# Patient Record
Sex: MALE | Race: WHITE | Employment: OTHER | URBAN - METROPOLITAN AREA
[De-identification: names, ages, dates, MRNs, and addresses within clinical notes are randomized per-mention and may not be internally consistent; named-entity substitution may affect disease eponyms.]

---

## 2017-02-08 ENCOUNTER — HOSPITAL ENCOUNTER (OUTPATIENT)
Dept: PHYSICAL THERAPY | Age: 83
Discharge: HOME | End: 2017-02-08
Attending: UROLOGY
Payer: MEDICARE

## 2017-02-08 DIAGNOSIS — R32 URINARY INCONTINENCE, UNSPECIFIED TYPE: ICD-10-CM

## 2017-02-08 PROCEDURE — G8988 SELF CARE GOAL STATUS: HCPCS

## 2017-02-08 PROCEDURE — 97162 PT EVAL MOD COMPLEX 30 MIN: CPT

## 2017-02-08 PROCEDURE — G8987 SELF CARE CURRENT STATUS: HCPCS

## 2017-02-08 NOTE — PROGRESS NOTES
Luis A Apple  : [facility]  [address], [address] [address],[address], [address] [facility]  [phone]   [phone]          OUTPATIENT PHYSICAL THERAPY:Initial Assessment 2017    ICD-10: Treatment Diagnosis:  Mixed incontinence (N39.46)  Precautions/Allergies:   Simvastatin and Statins-hmg-coa reductase inhibitors   Fall Risk Score: 2 (? 5 = High Risk)  MD Orders: Evaluate and treat MEDICAL/REFERRING DIAGNOSIS:  Urinary incontinence, unspecified type [R32]   DATE OF ONSET: 2016  REFERRING PHYSICIAN: Andry Hansen MD  RETURN PHYSICIAN APPOINTMENT: unknown     INITIAL ASSESSMENT:  Mr. Carlee Francois presents with symptoms of mixed incontinence. He demonstrates decreased strength and endurance of PFmm, as noted with biofeedback, likely contributing to the stress incontinence he has felt since his TURP procedure 2016. Previous prostate procedures, prior radiation to treat Prostate CA, as well as age are likely precipitating factors. Pt initially demonstrated assessory muscle use of abdominals and breath holding when performing kegels but this improved greatly with verbal and tactile cues. He was also educated on bladder irritants and recommended fluid intake for improved health of bladder, as well as urge suppression techniques. Pt will benefit from physical therapy to address stated problems. Plan of care was discussed and agreed upon with patient and HEP was initiated. Thank you for the opportunity to work with this patient. PROBLEM LIST (Impacting functional limitations):  1. Decreased Strength  2. Decreased strength of pelvic floor which limits bladder control INTERVENTIONS PLANNED:  1. Neuromuscular re-education  2. Biofeedback as needed  3. HEP  4. Bladder retraining  5. Bladder education  6. Neuromuscular Re-education/Strengthening  7. Therapeutic Exercise/Strengthening      TREATMENT PLAN:  Effective Dates: 17 TO 5/3/2017. Frequency/Duration: 1 time a week for 12 weeks  GOALS: (Goals have been discussed and agreed upon with patient.)  Short-Term Functional Goals: Time Frame: 2 weeks  1. Patient will demonstrate independence with home exercise program.  2. Pt will report increase in water intake to 4 cups/day for improved bladder health  Discharge Goals: Time Frame: 12 weeks  1. Pt will score 3 on NIH for overall functional improvement. 2. Pt will report decreased use of ppd to 2 for decreased social anxiety. 3. Pt will demonstrate improvement in strength and endurance to be able to hold isometric PF contraction 10 sec 10x for improved continence and decreased use of pads. 4. Pt will report decreased nocturia to 2x/night for improved quality of sleep and overall health. Rehabilitation Potential For Stated Goals: Good  Regarding Guy Faye's therapy, I certify that the treatment plan above will be carried out by a therapist or under their direction. Thank you for this referral,  Pilar Lomas, PT     Referring Physician Signature: Addi Lloyd MD              Date                    The information in this section was collected on 2/8/17 (except where otherwise noted). HISTORY:   History of Present Injury/Illness (Reason for Referral):  Pt reports incidence of incontinence Q2 hours with a strong urge. He also reports stress incontinence periodically with cough/sneeze and transitional movements. He took myrabetrix but did not feel it made much of a difference. Past Medical History/Comorbidities:   Mr. Joanna Ferrer  has a past medical history of Anemia; Anticoagulated on Coumadin (4/22/2015); Borderline type 2 diabetes mellitus; BPH (benign prostatic hyperplasia) (7/1/2016); BPH with obstruction/lower urinary tract symptoms (7/29/2010); Carotid stenosis; Chest discomfort, tightness, pressure  (10/8/2015); Chronic diastolic (congestive) heart failure ([address] Chronic diastolic heart failure ([address] (10/8/2015);  Deep vein thrombosis (DVT) ([facility] (10/8/2015); Diabetes mellitus type II, controlled ([facility] (4/22/2015); Diabetes mellitus type II, uncontrolled ([facility] DVT (deep venous thrombosis) ([facility] (4/23/2015); Elevated prostate specific antigen (PSA) (5/14/2010); Essential hypertension (8/2/2016); Ex-smoker for more than 1 year; GERD (gastroesophageal reflux disease); HTN (hypertension) (4/22/2015); Hyperlipidemia (10/8/2015); Hypoxemia (3/7/2015); Lumbar stenosis with neurogenic claudication (12/10/2014); Prostate cancer ([facility] (2010); Pul[facility] hypertension [facility] (4/23/2015); Spinal abscess ([facility] (4/2015); Stroke [facility]); and Wound infection (4/22/2015). He also has no past medical history of Adverse effect of anesthesia; Difficult intubation; Malignant hyperthermia due to anesthesia; Nausea & vomiting; or Pseudocholinesterase deficiency. Mr. Joanna Ferrer  has a past surgical history that includes urological; biopsy prostate; appendectomy (age 17); hernia repair (Right, 1's); cervical fusion (1990's); tonsillectomy (age 16); and cataract removal (Bilateral). TURP 7/2016,   Social History/Living Environment:     lives with wife  Prior Level of Function/Work/Activity:  Retired;  Knee problems hold him back from walking for exercise  Previous Treatment Approaches:          TURP 6 mos ago - incontinence started after this treatment  Radiation treatments a few yrs ago  Personal Factors:          Age:  81 y.o. Current Medications:    Current Outpatient Prescriptions:     finasteride (PROSCAR) 5 mg tablet, TAKE 1 TABLET BY MOUTH DAILY, Disp: 90 Tab, Rfl: 11    mirabegron ER (MYRBETRIQ) 50 mg ER tablet, Take 50 mg by mouth daily. , Disp: , Rfl:     amLODIPine (NORVASC) 5 mg tablet, Take 5 mg by mouth every morning., Disp: , Rfl:     ezetimibe (ZETIA) 10 mg tablet, Take 10 mg by mouth daily. , Disp: , Rfl:     metoprolol (LOPRESSOR) 100 mg tablet, Take 100 mg by mouth two (2) times a day.  Indications: HYPERTENSION, Disp: , Rfl:     fluticasone (FLONASE) 50 mcg/actuation nasal spray, , Disp: , Rfl: 5    lansoprazole (PREVACID) 30 mg capsule, Take 30 mg by mouth Daily (before breakfast). Indications: GASTROESOPHAGEAL REFLUX, Disp: , Rfl:     multivitamin (ONE A DAY) tablet, Take 1 Tab by mouth daily. Holding for surgery 6/24/16, Disp: , Rfl:     allopurinol (ZYLOPRIM) 300 mg tablet, Take 100 mg by mouth every morning. Indications: GOUT, GOUTY ARTHRITIS, Disp: , Rfl:     glipiZIDE (GLUCOTROL) 5 mg tablet, Take  by mouth two (2) times a day., Disp: , Rfl:     valsartan (DIOVAN) 320 mg tablet, Take 320 mg by mouth every morning. Indications: HYPERTENSION, Disp: , Rfl:     aspirin delayed-release 81 mg tablet, Take 81 mg by mouth daily. Holding for surgery per Dr Talon Mccall- last dose 6/23/16, Disp: , Rfl:    Date Last Reviewed:  02/08/17  Incontinence History:  PROBLEM: YES/NO: COMMENTS:   Loss of urine with coughing YES    Loss of urine with lifting  NO    Loss of urine with exercise, running NO Unknown - 'i've been slack on that bc of my knee'   Loss of urine with strong urge YES    Loss of urine with approaching the bathroom YES sometimes   Loss of urine with key in lock NO    Loss of urine as getting to toilet/removing clothing NO    Loss of urine when hearing running water NO    Have difficulty initiating a urine stream NO    Have difficulty stopping urine stream YES 'slow'   Have to strain to empty bladder NO    Dribble urine when urinating NO    Dribble urine after emptying bladder YES Very little   Experience pain with urination NO    Experience burning during urination NO    Have blood in urine NO      Voiding Patterns: Patient voids every 2 hours during the day and Q2hrs during the night. Patient reports that he empties bladder fully. Patient uses pads for bladder protection; he changes pads 4-5 times per day on a good day; 7-9 on a bad day . Fluid Intake: Patient drinks  (2 coffee; 3 tea; 2 cups water) of fluid per day.   He consumes 3 cups of caffeinated beverages per day. Patient tries not to limit fluid intake to control bladder. Bowel Habits: Patient demonstrates normal bowel habits. Mobility / Self Care: indep  Personal / Social History:  · Sexually active? NO:   · Social activities restricted due to urinary incontinence? NO:       Number of Personal Factors/Comorbidities that affect the Plan of Care: 1-2: MODERATE COMPLEXITY   EXAMINATION:   External Observation:   · Voluntary Contraction: present  · Voluntary Relaxation: present  · Involuntary Contraction: not present  · Involuntary Relaxation: not present  SEMG evaluation (Date 2/8/17):  ·  Resting Tone: <1uV  ·  Quality of Resting Tone: normal  ·  5 Second Hold: 4 uV  ·  10 Second Hold: currently unable to hold longer than 5 sec  ·  Quality of Recruitment: Good   ·  Quality of Relaxation: Good   ·  Quality of Holding: Fair   ·  Stability of Hold: Fair   ·  Stability of Rest: Good      Body Structures Involved:  1. Nerves  2. Muscles Body Functions Affected:  1. Mental  2. Genitourinary  3. Neuromusculoskeletal  4. Movement Related Activities and Participation Affected:  1. Learning and Applying Knowledge  2. General Tasks and Demands  3. Mobility  4. Self Care  5. Domestic Life  6. Interpersonal Interactions and Relationships  7. Community, Social and [place]   Number of elements that affect the Plan of Care: 3: MODERATE COMPLEXITY   CLINICAL PRESENTATION:   Presentation: Evolving clinical presentation with changing clinical characteristics: MODERATE COMPLEXITY   CLINICAL DECISION MAKING:   Outcome Measure:    Tool Used: NIH - Chronic Prostatitis Symptom Index (NIH - CPSI for Males)   Score:  Initial: 0/1/6 Most Recent: X (Date: -- )   Interpretation of Score:  Pain:  Score 0 1-4 5-8 9-12 13-16 17-20 21   Modifier CH CI CJ CK CL CM CN     Urinary Symptoms:  Score 0 1 2-3 4-5 6-7 8-9 10   Modifier CH CI CJ CK CL CM CN     Quality of Life Index:  Score 0 1-2 3-4 5-7 8-9 10-11 12   Modifier CH CI CJ CK CL CM CN       ? Self Care:     - CURRENT STATUS: CK - 40%-59% impaired, limited or restricted    - GOAL STATUS: CJ - 20%-39% impaired, limited or restricted    - D/C STATUS:  ---------------To be determined---------------       Medical Necessity:   · Patient demonstrates good rehab potential due to higher previous functional level. Reason for Services/Other Comments:  · Patient continues to require skilled intervention due to ongoing goals noted above. Use of outcome tool(s) and clinical judgement create a POC that gives a: Questionable prediction of patient's progress: MODERATE COMPLEXITY   TREATMENT:   (In addition to Assessment/Re-Assessment sessions the following treatments were rendered)  Pre-treatment Symptoms/Complaints:  Voiced frustration at lack of ability to hold continence and need to wear pads  Pain: Initial:   Pain Intensity 1: 0 0 Post Session:  0     THERAPEUTIC EXERCISE: (  minutes):  Exercises per grid below to improve strength and coordination. Required minimal verbal and tactile cues to promote proper body mechanics and promote proper body breathing techniques. Progressed resistance and repetitions as indicated. Date:   Date:   Date:     Activity/Exercise Parameters Parameters Parameters                                                Exercises:  Patient instructed in pelvic floor exercises listed below:  kegels 5 sec hold 10x TID  The following educational topics were reviewed with patient:  Bladder health, tips to control urge, bladder diary, pelvic floor anatomy, how foods affect bladder, bladder retraining. Treatment/Session Assessment:  Pt reported good understanding of plan of care as well as exercises. All questions were answered and pt was invited to call with any further questions or issues   · Response to Treatment:  good  · Compliance with Program/Exercises: Will assess as treatment progresses.   · Recommendations/Intent for next treatment session: \"Next visit will focus on advancements to more challenging activities\".   Total Treatment Duration:  PT Patient Time In/Time Out  Time In: 1100  Time Out: 5111 [facility] Evelyne Flores

## 2017-02-08 NOTE — PROGRESS NOTES
Ambulatory/Rehab Services H2 Model Falls Risk Assessment    Risk Factor Pts. ·   Confusion/Disorientation/Impulsivity  []    4 ·   Symptomatic Depression  []   2 ·   Altered Elimination  []   1 ·   Dizziness/Vertigo  []   1 ·   Gender (Male)  [x]   1 ·   Any administered antiepileptics (anticonvulsants):  []   2 ·   Any administered benzodiazepines:  []   1 ·   Visual Impairment (specify):  []   1 ·   Portable Oxygen Use  []   1 ·   Orthostatic ? BP  []   1 ·   History of Recent Falls (within 3 mos.)  []   5     Ability to Rise from Chair (choose one) Pts. ·   Ability to rise in a single movement  []   0 ·   Pushes up, successful in one attempt  [x]   1 ·   Multiple attempts, but successful  []   3 ·   Unable to rise without assistance  []   4   Total: (5 or greater = High Risk) 2     Falls Prevention Plan:   []                Physical Limitations to Exercise (specify):   []                Mobility Assistance Device (type):   []                Exercise/Equipment Adaptation (specify):    ©2010 [employer] [address] [place] [address] Patent #[id number].  Federal Law prohibits the replication, distribution or use without written permission from [employer]

## 2017-02-14 ENCOUNTER — HOSPITAL ENCOUNTER (OUTPATIENT)
Dept: PHYSICAL THERAPY | Age: 83
Discharge: HOME | End: 2017-02-14
Attending: UROLOGY
Payer: MEDICARE

## 2017-02-14 DIAGNOSIS — R32 URINARY INCONTINENCE, UNSPECIFIED TYPE: ICD-10-CM

## 2017-02-14 PROCEDURE — 97110 THERAPEUTIC EXERCISES: CPT

## 2017-02-14 NOTE — PROGRESS NOTES
Raman Caraballo  : [facility] [place] [id number] [facility], [address] [place] [address],[address], [facility] [address]  [phone]   [phone]          OUTPATIENT PHYSICAL THERAPY:Daily Note 2017    ICD-10: Treatment Diagnosis:  Mixed incontinence (N39.46)  Precautions/Allergies:   Simvastatin and Statins-hmg-coa reductase inhibitors   Fall Risk Score: 2 (? 5 = High Risk)  MD Orders: Evaluate and treat MEDICAL/REFERRING DIAGNOSIS:  Urinary incontinence, unspecified type [R32]   DATE OF ONSET: 2016  REFERRING PHYSICIAN: Joelle Juarez MD  RETURN PHYSICIAN APPOINTMENT: unknown     INITIAL ASSESSMENT:  Mr. Leonard Alaniz presents with symptoms of mixed incontinence. He demonstrates decreased strength and endurance of PFmm, as noted with biofeedback, likely contributing to the stress incontinence he has felt since his TURP procedure 2016. Previous prostate procedures, prior radiation to treat Prostate CA, as well as age are likely precipitating factors. Pt initially demonstrated assessory muscle use of abdominals and breath holding when performing kegels but this improved greatly with verbal and tactile cues. He was also educated on bladder irritants and recommended fluid intake for improved health of bladder, as well as urge suppression techniques. Pt will benefit from physical therapy to address stated problems. Plan of care was discussed and agreed upon with patient and HEP was initiated. Thank you for the opportunity to work with this patient. PROBLEM LIST (Impacting functional limitations):  1. Decreased Strength  2. Decreased strength of pelvic floor which limits bladder control INTERVENTIONS PLANNED:  1. Neuromuscular re-education  2. Biofeedback as needed  3. HEP  4. Bladder retraining  5. Bladder education  6. Neuromuscular Re-education/Strengthening  7. Therapeutic Exercise/Strengthening      TREATMENT PLAN:  Effective Dates: 17 TO 5/3/2017.   Frequency/Duration: 1 time a week for 12 weeks  GOALS: (Goals have been discussed and agreed upon with patient.)  Short-Term Functional Goals: Time Frame: 2 weeks  1. Patient will demonstrate independence with home exercise program.  2. Pt will report increase in water intake to 4 cups/day for improved bladder health  Discharge Goals: Time Frame: 12 weeks  1. Pt will score 3 on NIH for overall functional improvement. 2. Pt will report decreased use of ppd to 2 for decreased social anxiety. 3. Pt will demonstrate improvement in strength and endurance to be able to hold isometric PF contraction 10 sec 10x for improved continence and decreased use of pads. 4. Pt will report decreased nocturia to 2x/night for improved quality of sleep and overall health. Rehabilitation Potential For Stated Goals: Good  Regarding [facility], I certify that the treatment plan above will be carried out by a therapist or under their direction. Thank you for this referral,  Betty Alston, PT     Referring Physician Signature: Lyn Sheikh MD              Date                    The information in this section was collected on 2/8/17 (except where otherwise noted). HISTORY:   History of Present Injury/Illness (Reason for Referral):  Pt reports incidence of incontinence Q2 hours with a strong urge. He also reports stress incontinence periodically with cough/sneeze and transitional movements. He took myrabetrix but did not feel it made much of a difference. Past Medical History/Comorbidities:   Mr. Abiel Cm  has a past medical history of Anemia; Anticoagulated on Coumadin (4/22/2015); Borderline type 2 diabetes mellitus; BPH (benign prostatic hyperplasia) (7/1/2016); BPH with obstruction/lower urinary tract symptoms (7/29/2010); Carotid stenosis; Chest discomfort, tightness, pressure  (10/8/2015); Chronic diastolic (congestive) heart failure ([facility] [address] Chronic diastolic heart failure ([facility] [address] (10/8/2015);  Deep vein thrombosis (DVT) ([facility]) (10/8/2015); Diabetes mellitus type II, controlled ([facility] [address] (4/22/2015); Diabetes mellitus type II, uncontrolled ([facility] [address] DVT (deep venous thrombosis) ([facility] [address] (4/23/2015); Elevated prostate specific antigen (PSA) (5/14/2010); Essential hypertension (8/2/2016); Ex-smoker for more than 1 year; GERD (gastroesophageal reflux disease); HTN (hypertension) (4/22/2015); Hyperlipidemia (10/8/2015); Hypoxemia (3/7/2015); Lumbar stenosis with neurogenic claudication (12/10/2014); Prostate cancer ([facility] [address] (2010); Pulmonary hypertension ([facility] [address] (4/23/2015); Spinal abscess ([facility] [address] (4/2015); Stroke [facility]); and Wound infection (4/22/2015). He also has no past medical history of Adverse effect of anesthesia; Difficult intubation; Malignant hyperthermia due to anesthesia; Nausea & vomiting; or Pseudocholinesterase deficiency. Mr. Dash Goldberg  has a past surgical history that includes urological; biopsy prostate; appendectomy (age 17); hernia repair (Right, 1's); cervical fusion (1990's); tonsillectomy (age 16); and cataract removal (Bilateral). TURP 7/2016,   Social History/Living Environment:     lives with wife  Prior Level of Function/Work/Activity:  Retired;  Knee problems hold him back from walking for exercise  Previous Treatment Approaches:          TURP 6 mos ago - incontinence started after this treatment  Radiation treatments a few yrs ago  Personal Factors:          Age:  81 y.o. Current Medications:    Current Outpatient Prescriptions:     finasteride (PROSCAR) 5 mg tablet, TAKE 1 TABLET BY MOUTH DAILY, Disp: 90 Tab, Rfl: 11    mirabegron ER (MYRBETRIQ) 50 mg ER tablet, Take 50 mg by mouth daily. , Disp: , Rfl:     amLODIPine (NORVASC) 5 mg tablet, Take 5 mg by mouth every morning., Disp: , Rfl:     ezetimibe (ZETIA) 10 mg tablet, Take 10 mg by mouth daily. , Disp: , Rfl:     metoprolol (LOPRESSOR) 100 mg tablet, Take 100 mg by mouth two (2) times a day.  Indications: HYPERTENSION, Disp: , Rfl:     fluticasone (FLONASE) 50 mcg/actuation nasal spray, , Disp: , Rfl: 5    lansoprazole (PREVACID) 30 mg capsule, Take 30 mg by mouth Daily (before breakfast). Indications: GASTROESOPHAGEAL REFLUX, Disp: , Rfl:     multivitamin (ONE A DAY) tablet, Take 1 Tab by mouth daily. Holding for surgery 6/24/16, Disp: , Rfl:     allopurinol (ZYLOPRIM) 300 mg tablet, Take 100 mg by mouth every morning. Indications: GOUT, GOUTY ARTHRITIS, Disp: , Rfl:     glipiZIDE (GLUCOTROL) 5 mg tablet, Take  by mouth two (2) times a day., Disp: , Rfl:     valsartan (DIOVAN) 320 mg tablet, Take 320 mg by mouth every morning. Indications: HYPERTENSION, Disp: , Rfl:     aspirin delayed-release 81 mg tablet, Take 81 mg by mouth daily. Holding for surgery per Dr Caryl Hill- last dose 6/23/16, Disp: , Rfl:    Date Last Reviewed:  02/14/17  Incontinence History:  PROBLEM: YES/NO: COMMENTS:   Loss of urine with coughing YES    Loss of urine with lifting  NO    Loss of urine with exercise, running NO Unknown - 'i've been slack on that bc of my knee'   Loss of urine with strong urge YES    Loss of urine with approaching the bathroom YES sometimes   Loss of urine with key in lock NO    Loss of urine as getting to toilet/removing clothing NO    Loss of urine when hearing running water NO    Have difficulty initiating a urine stream NO    Have difficulty stopping urine stream YES 'slow'   Have to strain to empty bladder NO    Dribble urine when urinating NO    Dribble urine after emptying bladder YES Very little   Experience pain with urination NO    Experience burning during urination NO    Have blood in urine NO      Voiding Patterns: Patient voids every 2 hours during the day and Q2hrs during the night. Patient reports that he empties bladder fully. Patient uses pads for bladder protection; he changes pads 4-5 times per day on a good day; 7-9 on a bad day . Fluid Intake: Patient drinks  (2 coffee; 3 tea; 2 cups water) of fluid per day.   He consumes 3 cups of caffeinated beverages per day. Patient tries not to limit fluid intake to control bladder. Bowel Habits: Patient demonstrates normal bowel habits. Mobility / Self Care: indep  Personal / Social History:  · Sexually active? NO:   · Social activities restricted due to urinary incontinence? NO:       Number of Personal Factors/Comorbidities that affect the Plan of Care: 1-2: MODERATE COMPLEXITY   EXAMINATION:   External Observation:   · Voluntary Contraction: present  · Voluntary Relaxation: present  · Involuntary Contraction: not present  · Involuntary Relaxation: not present  SEMG evaluation (Date 2/8/17):  ·  Resting Tone: <1uV  ·  Quality of Resting Tone: normal  ·  5 Second Hold: 4 uV  ·  10 Second Hold: currently unable to hold longer than 5 sec  ·  Quality of Recruitment: Good   ·  Quality of Relaxation: Good   ·  Quality of Holding: Fair   ·  Stability of Hold: Fair   ·  Stability of Rest: Good      Body Structures Involved:  1. Nerves  2. Muscles Body Functions Affected:  1. Mental  2. Genitourinary  3. Neuromusculoskeletal  4. Movement Related Activities and Participation Affected:  1. Learning and Applying Knowledge  2. General Tasks and Demands  3. Mobility  4. Self Care  5. Domestic Life  6. Interpersonal Interactions and Relationships  7. Community, Social and [place]   Number of elements that affect the Plan of Care: 3: MODERATE COMPLEXITY   CLINICAL PRESENTATION:   Presentation: Evolving clinical presentation with changing clinical characteristics: MODERATE COMPLEXITY   CLINICAL DECISION MAKING:   Outcome Measure:    Tool Used: NIH - Chronic Prostatitis Symptom Index (NIH - CPSI for Males)   Score:  Initial: 0/1/6 Most Recent: X (Date: -- )   Interpretation of Score:  Pain:  Score 0 1-4 5-8 9-12 13-16 17-20 21   Modifier CH CI CJ CK CL CM CN     Urinary Symptoms:  Score 0 1 2-3 4-5 6-7 8-9 10   Modifier CH CI CJ CK CL CM CN     Quality of Life Index:  Score 0 1-2 3-4 5-7 8-9 10-11 12   Modifier [address] CI CJ CK CL CM CN       ? Self Care:     - CURRENT STATUS: CK - 40%-59% impaired, limited or restricted    - GOAL STATUS: CJ - 20%-39% impaired, limited or restricted    - D/C STATUS:  ---------------To be determined---------------       Medical Necessity:   · Patient demonstrates good rehab potential due to higher previous functional level. Reason for Services/Other Comments:  · Patient continues to require skilled intervention due to ongoing goals noted above. Use of outcome tool(s) and clinical judgement create a POC that gives a: Questionable prediction of patient's progress: MODERATE COMPLEXITY   TREATMENT:   (In addition to Assessment/Re-Assessment sessions the following treatments were rendered)  Pre-treatment Symptoms/Complaints:  He states ex's have been going well. Slept for 4 hrs for the first time last night. Did report increased leaking sat and sun over the weekend but can not find a reason why that could be. v  Pain: Initial:   Pain Intensity 1: 0 0 Post Session:  0     THERAPEUTIC EXERCISE: ( 55 minutes):  Exercises per grid below to improve strength and coordination. Required minimal verbal and tactile cues to promote proper body mechanics and promote proper body breathing techniques. Progressed resistance and repetitions as indicated. Date:  2/14/17 Date:   Date:     Activity/Exercise Parameters Parameters Parameters   Isometric PFmm See below     Bridge with ball squeeze 8x     Bridge with isometric ER      Sidelying clam GTB 8x     sidestepping      Monster walks      squats         Exercises:  Patient instructed in pelvic floor exercises listed below:  kegels 5 sec hold 10x TID  The following educational topics were reviewed with patient:  Bladder health, tips to control urge, bladder diary, pelvic floor anatomy, how foods affect bladder, bladder retraining. Treatment/Session Assessment: good tolerance to ex's.   Improved endurance noted with ability to hold contraction closer to 6-8sec today. Initiated large muscle group ex's and issued bridge and clam for HEP. GTB issued. Discussed quick flicks when in bathroom preparing to urinate as this is an incidence of much of his leaking. Other large incidence is starting walking after standing up from couch. · Response to Treatment:  good  · Compliance with Program/Exercises: Will assess as treatment progresses. · Recommendations/Intent for next treatment session: \"Next visit will focus on advancements to more challenging activities\".   Total Treatment Duration:  PT Patient Time In/Time Out  Time In: 1430  Time Out: 1530    Shala Israel, PT

## 2017-03-01 ENCOUNTER — HOSPITAL ENCOUNTER (OUTPATIENT)
Dept: PHYSICAL THERAPY | Age: 83
Discharge: HOME | End: 2017-03-01
Attending: UROLOGY
Payer: MEDICARE

## 2017-03-01 DIAGNOSIS — R32 URINARY INCONTINENCE, UNSPECIFIED TYPE: ICD-10-CM

## 2017-03-01 PROCEDURE — 97110 THERAPEUTIC EXERCISES: CPT

## 2017-03-01 NOTE — PROGRESS NOTES
Ashley Barajas  : 1934 [facility]  [address], [address], [address] [facility]  [phone]   [phone]          OUTPATIENT PHYSICAL THERAPY:Daily Note 3/1/2017    ICD-10: Treatment Diagnosis:  Mixed incontinence (N39.46)  Precautions/Allergies:   Simvastatin and Statins-hmg-coa reductase inhibitors   Fall Risk Score: 2 (? 5 = High Risk)  MD Orders: Evaluate and treat MEDICAL/REFERRING DIAGNOSIS:  Urinary incontinence, unspecified type [R32]   DATE OF ONSET: 2016  REFERRING PHYSICIAN: Lyn Sheikh MD  RETURN PHYSICIAN APPOINTMENT: unknown     INITIAL ASSESSMENT:  Mr. Abiel Cm presents with symptoms of mixed incontinence. He demonstrates decreased strength and endurance of PFmm, as noted with biofeedback, likely contributing to the stress incontinence he has felt since his TURP procedure 2016. Previous prostate procedures, prior radiation to treat Prostate CA, as well as age are likely precipitating factors. Pt initially demonstrated assessory muscle use of abdominals and breath holding when performing kegels but this improved greatly with verbal and tactile cues. He was also educated on bladder irritants and recommended fluid intake for improved health of bladder, as well as urge suppression techniques. Pt will benefit from physical therapy to address stated problems. Plan of care was discussed and agreed upon with patient and HEP was initiated. Thank you for the opportunity to work with this patient. PROBLEM LIST (Impacting functional limitations):  1. Decreased Strength  2. Decreased strength of pelvic floor which limits bladder control INTERVENTIONS PLANNED:  1. Neuromuscular re-education  2. Biofeedback as needed  3. HEP  4. Bladder retraining  5. Bladder education  6. Neuromuscular Re-education/Strengthening  7. Therapeutic Exercise/Strengthening      TREATMENT PLAN:  Effective Dates: 17 TO 5/3/2017.   Frequency/Duration: 1 time a week for 12 weeks  GOALS: (Goals have been discussed and agreed upon with patient.)  Short-Term Functional Goals: Time Frame: 2 weeks  1. Patient will demonstrate independence with home exercise program.  2. Pt will report increase in water intake to 4 cups/day for improved bladder health  Discharge Goals: Time Frame: 12 weeks  1. Pt will score 3 on NIH for overall functional improvement. 2. Pt will report decreased use of ppd to 2 for decreased social anxiety. 3. Pt will demonstrate improvement in strength and endurance to be able to hold isometric PF contraction 10 sec 10x for improved continence and decreased use of pads. 4. Pt will report decreased nocturia to 2x/night for improved quality of sleep and overall health. Rehabilitation Potential For Stated Goals: Good  Regarding Abimael Faye's therapy, I certify that the treatment plan above will be carried out by a therapist or under their direction. Thank you for this referral,  Renzo Lovett, PT     Referring Physician Signature: Yanira Drake MD              Date                    The information in this section was collected on 2/8/17 (except where otherwise noted). HISTORY:   History of Present Injury/Illness (Reason for Referral):  Pt reports incidence of incontinence Q2 hours with a strong urge. He also reports stress incontinence periodically with cough/sneeze and transitional movements. He took myrabetrix but did not feel it made much of a difference. Past Medical History/Comorbidities:   Mr. Jv Zuniga  has a past medical history of Anemia; Anticoagulated on Coumadin (4/22/2015); Borderline type 2 diabetes mellitus; BPH (benign prostatic hyperplasia) (7/1/2016); BPH with obstruction/lower urinary tract symptoms (7/29/2010); Carotid stenosis; Chest discomfort, tightness, pressure  (10/8/2015); Chronic diastolic (congestive) heart failure ([address] Chronic diastolic heart failure ([address] (10/8/2015);  Deep vein thrombosis (DVT) ([facility]) (10/8/2015); Diabetes mellitus type II, controlled ([facility] (4/22/2015); Diabetes mellitus type II, uncontrolled ([facility] DVT (deep venous thrombosis) ([facility] (4/23/2015); Elevated prostate specific antigen (PSA) (5/14/2010); Essential hypertension (8/2/2016); Ex-smoker for more than 1 year; GERD (gastroesophageal reflux disease); HTN (hypertension) (4/22/2015); Hyperlipidemia (10/8/2015); Hypoxemia (3/7/2015); Lumbar stenosis with neurogenic claudication (12/10/2014); Prostate cancer ([facility] (2010); Pulmonary hypertension ([facility] (4/23/2015); Spinal abscess ([facility] (4/2015); Stroke [facility]); and Wound infection (4/22/2015). He also has no past medical history of Adverse effect of anesthesia; Difficult intubation; Malignant hyperthermia due to anesthesia; Nausea & vomiting; or Pseudocholinesterase deficiency. Mr. Ayad Watters  has a past surgical history that includes urological; biopsy prostate; appendectomy (age 17); hernia repair (Right, 1's); cervical fusion (1990's); tonsillectomy (age 16); and cataract removal (Bilateral). TURP 7/2016,   Social History/Living Environment:     lives with wife  Prior Level of Function/Work/Activity:  Retired;  Knee problems hold him back from walking for exercise  Previous Treatment Approaches:          TURP 6 mos ago - incontinence started after this treatment  Radiation treatments a few yrs ago  Personal Factors:          Age:  81 y.o. Current Medications:    Current Outpatient Prescriptions:     finasteride (PROSCAR) 5 mg tablet, TAKE 1 TABLET BY MOUTH DAILY, Disp: 90 Tab, Rfl: 11    mirabegron ER (MYRBETRIQ) 50 mg ER tablet, Take 50 mg by mouth daily. , Disp: , Rfl:     amLODIPine (NORVASC) 5 mg tablet, Take 5 mg by mouth every morning., Disp: , Rfl:     ezetimibe (ZETIA) 10 mg tablet, Take 10 mg by mouth daily. , Disp: , Rfl:     metoprolol (LOPRESSOR) 100 mg tablet, Take 100 mg by mouth two (2) times a day.  Indications: HYPERTENSION, Disp: , Rfl:     fluticasone (FLONASE) 50 mcg/actuation nasal spray, , Disp: , Rfl: 5    lansoprazole (PREVACID) 30 mg capsule, Take 30 mg by mouth Daily (before breakfast). Indications: GASTROESOPHAGEAL REFLUX, Disp: , Rfl:     multivitamin (ONE A DAY) tablet, Take 1 Tab by mouth daily. Holding for surgery 6/24/16, Disp: , Rfl:     allopurinol (ZYLOPRIM) 300 mg tablet, Take 100 mg by mouth every morning. Indications: GOUT, GOUTY ARTHRITIS, Disp: , Rfl:     glipiZIDE (GLUCOTROL) 5 mg tablet, Take  by mouth two (2) times a day., Disp: , Rfl:     valsartan (DIOVAN) 320 mg tablet, Take 320 mg by mouth every morning. Indications: HYPERTENSION, Disp: , Rfl:     aspirin delayed-release 81 mg tablet, Take 81 mg by mouth daily. Holding for surgery per Dr Jordan Schneider- last dose 6/23/16, Disp: , Rfl:    Date Last Reviewed:  03/01/17  Incontinence History:  PROBLEM: YES/NO: COMMENTS:   Loss of urine with coughing YES    Loss of urine with lifting  NO    Loss of urine with exercise, running NO Unknown - 'i've been slack on that bc of my knee'   Loss of urine with strong urge YES    Loss of urine with approaching the bathroom YES sometimes   Loss of urine with key in lock NO    Loss of urine as getting to toilet/removing clothing NO    Loss of urine when hearing running water NO    Have difficulty initiating a urine stream NO    Have difficulty stopping urine stream YES 'slow'   Have to strain to empty bladder NO    Dribble urine when urinating NO    Dribble urine after emptying bladder YES Very little   Experience pain with urination NO    Experience burning during urination NO    Have blood in urine NO      Voiding Patterns: Patient voids every 2 hours during the day and Q2hrs during the night. Patient reports that he empties bladder fully. Patient uses pads for bladder protection; he changes pads 4-5 times per day on a good day; 7-9 on a bad day . Fluid Intake: Patient drinks  (2 coffee; 3 tea; 2 cups water) of fluid per day.   He consumes 3 cups of caffeinated beverages per day. Patient tries not to limit fluid intake to control bladder. Bowel Habits: Patient demonstrates normal bowel habits. Mobility / Self Care: indep  Personal / Social History:  · Sexually active? NO:   · Social activities restricted due to urinary incontinence? NO:       Number of Personal Factors/Comorbidities that affect the Plan of Care: 1-2: MODERATE COMPLEXITY   EXAMINATION:   External Observation:   · Voluntary Contraction: present  · Voluntary Relaxation: present  · Involuntary Contraction: not present  · Involuntary Relaxation: not present  SEMG evaluation (Date 2/8/17):  ·  Resting Tone: <1uV  ·  Quality of Resting Tone: normal  ·  5 Second Hold: 4 uV  ·  10 Second Hold: currently unable to hold longer than 5 sec  ·  Quality of Recruitment: Good   ·  Quality of Relaxation: Good   ·  Quality of Holding: Fair   ·  Stability of Hold: Fair   ·  Stability of Rest: Good      Body Structures Involved:  1. Nerves  2. Muscles Body Functions Affected:  1. Mental  2. Genitourinary  3. Neuromusculoskeletal  4. Movement Related Activities and Participation Affected:  1. Learning and Applying Knowledge  2. General Tasks and Demands  3. Mobility  4. Self Care  5. Domestic Life  6. Interpersonal Interactions and Relationships  7. Community, Social and [place]   Number of elements that affect the Plan of Care: 3: MODERATE COMPLEXITY   CLINICAL PRESENTATION:   Presentation: Evolving clinical presentation with changing clinical characteristics: MODERATE COMPLEXITY   CLINICAL DECISION MAKING:   Outcome Measure:    Tool Used: NIH - Chronic Prostatitis Symptom Index (NIH - CPSI for Males)   Score:  Initial: 0/1/6 Most Recent: X (Date: -- )   Interpretation of Score:  Pain:  Score 0 1-4 5-8 9-12 13-16 17-20 21   Modifier CH CI CJ CK CL CM CN     Urinary Symptoms:  Score 0 1 2-3 4-5 6-7 8-9 10   Modifier CH CI CJ CK CL CM CN     Quality of Life Index:  Score 0 1-2 3-4 5-7 8-9 10-11 12   Modifier [facility] CI CJ CK CL CM CN       ? Self Care:     - CURRENT STATUS: CK - 40%-59% impaired, limited or restricted    - GOAL STATUS: CJ - 20%-39% impaired, limited or restricted    - D/C STATUS:  ---------------To be determined---------------       Medical Necessity:   · Patient demonstrates good rehab potential due to higher previous functional level. Reason for Services/Other Comments:  · Patient continues to require skilled intervention due to ongoing goals noted above. Use of outcome tool(s) and clinical judgement create a POC that gives a: Questionable prediction of patient's progress: MODERATE COMPLEXITY   TREATMENT:   (In addition to Assessment/Re-Assessment sessions the following treatments were rendered)  Pre-treatment Symptoms/Complaints: sleeping about 2.5hrs at a time at night now. States most of problems occur when fatigued. He is having a lot of knee problems. Reports some difficulty with leaking performing transitional movements especially from his couch which is low  Pain: Initial:   Pain Intensity 1: (P) 0 0 Post Session:  0     THERAPEUTIC EXERCISE: ( 60 minutes):  Exercises per grid below to improve strength and coordination. Required minimal verbal and tactile cues to promote proper body mechanics and promote proper body breathing techniques. Progressed resistance and repetitions as indicated.     Date:  2/14/17 Date:  3/1/17 Date:     Activity/Exercise Parameters Parameters Parameters   Isometric PFmm See below See below    Bridge with ball squeeze 8x 10x    Bridge with isometric ER  GTB 10x    Sidelying clam GTB 8x GTB 2x10    sidestepping  1 lap GTB    Monster walks      squats  GTB 8x    Sit to stand mechanics with kegel  8'             Exercises:  Patient instructed in pelvic floor exercises listed below:  kegels 5 sec hold 10x TID  3/1/17 - progressed to 10 sec 10x TID at home  The following educational topics were reviewed with patient:  Bladder health, tips to control urge, bladder diary, pelvic floor anatomy, how foods affect bladder, bladder retraining. · Treatment/Session Assessment: continue with tendency to contract abs during PFmm contraction. Improves with vc's. Continue with endurance deficits but slightly improved. Increased to 10sec hold for HEP in hopes of stressing endurance. Assess prolonged response. Instructed in knack with transitional movements, more specifically sit to/from stand and encouraged to practice this every time he does this motion. Added squats and sidesteps for HEP. · Response to Treatment:  good  · Compliance with Program/Exercises: Will assess as treatment progresses. · Recommendations/Intent for next treatment session: \"Next visit will focus on advancements to more challenging activities\".   Total Treatment Duration:  PT Patient Time In/Time Out  Time In: (P) 3320  Time Out: (P) [address]

## 2017-03-08 ENCOUNTER — HOSPITAL ENCOUNTER (OUTPATIENT)
Dept: PHYSICAL THERAPY | Age: 83
Discharge: HOME | End: 2017-03-08
Attending: UROLOGY
Payer: MEDICARE

## 2017-03-08 DIAGNOSIS — R32 URINARY INCONTINENCE, UNSPECIFIED TYPE: ICD-10-CM

## 2017-03-08 PROCEDURE — 97110 THERAPEUTIC EXERCISES: CPT

## 2017-03-08 NOTE — PROGRESS NOTES
Luis A Apple  : [facility] [address] [facility] [address], [address] [address],[address], [address] [facility]  [phone]   [phone]          OUTPATIENT PHYSICAL THERAPY:Daily Note and Progress Report 3/8/2017    ICD-10: Treatment Diagnosis:  Mixed incontinence (N39.46)  Precautions/Allergies:   Simvastatin and Statins-hmg-coa reductase inhibitors   Fall Risk Score: 2 (? 5 = High Risk)  MD Orders: Evaluate and treat MEDICAL/REFERRING DIAGNOSIS:  Urinary incontinence, unspecified type [R32]   DATE OF ONSET: 2016  REFERRING PHYSICIAN: Andry Hansen MD  RETURN PHYSICIAN APPOINTMENT: unknown   REASSESSMENT: Pt has been seen for PT 4x since initial evaluation on 17 and demonstrates improvement in strength and endurance as noted with biofeedback. Use of ppd has decreased from 4-5 to avg of 3. Progress has fluctuated with most recent setback occurring on . This was possibly caused by pt's recent decrease in fluid intake leading to mild dehydration. Pt will benefit from continued PT to work towards meeting ongoing goals. INITIAL ASSESSMENT:  Mr. Carlee Francois presents with symptoms of mixed incontinence. He demonstrates decreased strength and endurance of PFmm, as noted with biofeedback, likely contributing to the stress incontinence he has felt since his TURP procedure 2016. Previous prostate procedures, prior radiation to treat Prostate CA, as well as age are likely precipitating factors. Pt initially demonstrated assessory muscle use of abdominals and breath holding when performing kegels but this improved greatly with verbal and tactile cues. He was also educated on bladder irritants and recommended fluid intake for improved health of bladder, as well as urge suppression techniques. Pt will benefit from physical therapy to address stated problems. Plan of care was discussed and agreed upon with patient and HEP was initiated.  Thank you for the opportunity to work with this patient. PROBLEM LIST (Impacting functional limitations):  1. Decreased Strength  2. Decreased strength of pelvic floor which limits bladder control INTERVENTIONS PLANNED:  1. Neuromuscular re-education  2. Biofeedback as needed  3. HEP  4. Bladder retraining  5. Bladder education  6. Neuromuscular Re-education/Strengthening  7. Therapeutic Exercise/Strengthening      TREATMENT PLAN:  Effective Dates: 2/8/17 TO 5/3/2017. Frequency/Duration: 1 time a week for 12 weeks  GOALS: (Goals have been discussed and agreed upon with patient.)  Short-Term Functional Goals: Time Frame: 2 weeks  1. Patient will demonstrate independence with home exercise program.  GOAL MET 3/8/17  2. Pt will report increase in water intake to 4 cups/day for improved bladder health. GOAL IMPROVED BUT FLUCTUATES 2/8/17  Discharge Goals: Time Frame: 12 weeks  1. Pt will score 3 on NIH for overall functional improvement. IMPROVED BUT ONGOING 3/8/17  2. Pt will report decreased use of ppd to 2 for decreased social anxiety. IMPROVED BUT ONGOING 3/8/17  3. Pt will demonstrate improvement in strength and endurance to be able to hold isometric PF contraction 10 sec 10x for improved continence and decreased use of pads. GONGOING 3/8/17  4. Pt will report decreased nocturia to 2x/night for improved quality of sleep and overall health. GOAL MET LAST NIGHT BUT INCONSISTENT 3/8/17  Rehabilitation Potential For Stated Goals: Good  Regarding Apple Faye's therapy, I certify that the treatment plan above will be carried out by a therapist or under their direction. Thank you for this referral,  Honey Zuniga PT     Referring Physician Signature: Armani Shelley MD              Date                    The information in this section was collected on 2/8/17 (except where otherwise noted). HISTORY:   History of Present Injury/Illness (Reason for Referral):  Pt reports incidence of incontinence Q2 hours with a strong urge.   He also reports stress incontinence periodically with cough/sneeze and transitional movements. He took myrabetrix but did not feel it made much of a difference. Past Medical History/Comorbidities:   Mr. Monty Monroe  has a past medical history of Anemia; Anticoagulated on Coumadin (4/22/2015); Borderline type 2 diabetes mellitus; BPH (benign prostatic hyperplasia) (7/1/2016); BPH with obstruction/lower urinary tract symptoms (7/29/2010); Carotid stenosis; Chest discomfort, tightness, pressure  (10/8/2015); Chronic diastolic (congestive) heart failure ([address] Chronic diastolic heart failure ([address] (10/8/2015); Deep vein thrombosis (DVT) ([address] (10/8/2015); Diabetes mellitus type II, controlled ([address] (4/22/2015); Diabetes mellitus type II, uncontrolled ([address] DVT (deep venous thrombosis) ([address] (4/23/2015); Elevated prostate specific antigen (PSA) (5/14/2010); Essential hypertension (8/2/2016); Ex-smoker for more than 1 year; GERD (gastroesophageal reflux disease); HTN (hypertension) (4/22/2015); Hyperlipidemia (10/8/2015); Hypoxemia (3/7/2015); Lumbar stenosis with neurogenic claudication (12/10/2014); Prostate cancer ([address] (2010); Pulmonary hypertension ([address] (4/23/2015); Spinal abscess ([address] (4/2015); Stroke [facility]); and Wound infection (4/22/2015). He also has no past medical history of Adverse effect of anesthesia; Difficult intubation; Malignant hyperthermia due to anesthesia; Nausea & vomiting; or Pseudocholinesterase deficiency. Mr. Monty Monroe  has a past surgical history that includes urological; biopsy prostate; appendectomy (age 17); hernia repair (Right, [id number]); cervical fusion (1990's); tonsillectomy (age 16); and cataract removal (Bilateral). TURP 7/2016,   Social History/Living Environment:     lives with wife  Prior Level of Function/Work/Activity:  Retired;  Knee problems hold him back from walking for exercise  Previous Treatment Approaches:          TURP 6 mos ago - incontinence started after this treatment  Radiation treatments a few yrs ago  Personal Factors:          Age:  81 y.o. Current Medications:    Current Outpatient Prescriptions:     finasteride (PROSCAR) 5 mg tablet, TAKE 1 TABLET BY MOUTH DAILY, Disp: 90 Tab, Rfl: 11    mirabegron ER (MYRBETRIQ) 50 mg ER tablet, Take 50 mg by mouth daily. , Disp: , Rfl:     amLODIPine (NORVASC) 5 mg tablet, Take 5 mg by mouth every morning., Disp: , Rfl:     ezetimibe (ZETIA) 10 mg tablet, Take 10 mg by mouth daily. , Disp: , Rfl:     metoprolol (LOPRESSOR) 100 mg tablet, Take 100 mg by mouth two (2) times a day. Indications: HYPERTENSION, Disp: , Rfl:     fluticasone (FLONASE) 50 mcg/actuation nasal spray, , Disp: , Rfl: 5    lansoprazole (PREVACID) 30 mg capsule, Take 30 mg by mouth Daily (before breakfast). Indications: GASTROESOPHAGEAL REFLUX, Disp: , Rfl:     multivitamin (ONE A DAY) tablet, Take 1 Tab by mouth daily. Holding for surgery 6/24/16, Disp: , Rfl:     allopurinol (ZYLOPRIM) 300 mg tablet, Take 100 mg by mouth every morning. Indications: GOUT, GOUTY ARTHRITIS, Disp: , Rfl:     glipiZIDE (GLUCOTROL) 5 mg tablet, Take  by mouth two (2) times a day., Disp: , Rfl:     valsartan (DIOVAN) 320 mg tablet, Take 320 mg by mouth every morning. Indications: HYPERTENSION, Disp: , Rfl:     aspirin delayed-release 81 mg tablet, Take 81 mg by mouth daily.  Holding for surgery per Dr Juan Schulz- last dose 6/23/16, Disp: , Rfl:    Date Last Reviewed:  03/08/17  Incontinence History:  PROBLEM: YES/NO: COMMENTS:   Loss of urine with coughing YES    Loss of urine with lifting  NO    Loss of urine with exercise, running NO Unknown - 'i've been slack on that bc of my knee'   Loss of urine with strong urge YES    Loss of urine with approaching the bathroom YES sometimes   Loss of urine with key in lock NO    Loss of urine as getting to toilet/removing clothing NO    Loss of urine when hearing running water NO    Have difficulty initiating a urine stream NO Have difficulty stopping urine stream YES 'slow'   Have to strain to empty bladder NO    Dribble urine when urinating NO    Dribble urine after emptying bladder YES Very little   Experience pain with urination NO    Experience burning during urination NO    Have blood in urine NO      Voiding Patterns: Patient voids every 2 hours during the day and Q2hrs during the night. Patient reports that he empties bladder fully. Patient uses pads for bladder protection; he changes pads 4-5 times per day on a good day; 7-9 on a bad day . Fluid Intake: Patient drinks  (2 coffee; 3 tea; 2 cups water) of fluid per day. He consumes 3 cups of caffeinated beverages per day. Patient tries not to limit fluid intake to control bladder. Bowel Habits: Patient demonstrates normal bowel habits. Mobility / Self Care: indep  Personal / Social History:  · Sexually active? NO:   · Social activities restricted due to urinary incontinence? NO:       Number of Personal Factors/Comorbidities that affect the Plan of Care: 1-2: MODERATE COMPLEXITY   EXAMINATION:   External Observation:   · Voluntary Contraction: present  · Voluntary Relaxation: present  · Involuntary Contraction: not present  · Involuntary Relaxation: not present  SEMG evaluation (Date 2/8/17):  ·  Resting Tone: <1uV  ·  Quality of Resting Tone: normal  ·  5 Second Hold: 4 uV  ·  10 Second Hold: currently unable to hold longer than 5 sec  ·  Quality of Recruitment: Good   ·  Quality of Relaxation: Good   ·  Quality of Holding: Fair   ·  Stability of Hold: Fair   ·  Stability of Rest: Good      Body Structures Involved:  1. Nerves  2. Muscles Body Functions Affected:  1. Mental  2. Genitourinary  3. Neuromusculoskeletal  4. Movement Related Activities and Participation Affected:  1. Learning and Applying Knowledge  2. General Tasks and Demands  3. Mobility  4. Self Care  5. Domestic Life  6. Interpersonal Interactions and Relationships  7.  Community, Social and [place] Number of elements that affect the Plan of Care: 3: MODERATE COMPLEXITY   CLINICAL PRESENTATION:   Presentation: Evolving clinical presentation with changing clinical characteristics: MODERATE COMPLEXITY   CLINICAL DECISION MAKING:   Outcome Measure: Tool Used: NIH - Chronic Prostatitis Symptom Index (NIH - CPSI for Males)   Score:  Initial: 0/1/6 Most Recent: 0/3/5 (Date: 3/8/17 )   Interpretation of Score:  Pain:  Score 0 1-4 5-8 9-12 13-16 17-20 21   Modifier CH CI CJ CK CL CM CN     Urinary Symptoms:  Score 0 1 2-3 4-5 6-7 8-9 10   Modifier CH CI CJ CK CL CM CN     Quality of Life Index:  Score 0 1-2 3-4 5-7 8-9 10-11 12   Modifier CH CI CJ CK CL CM CN       ? Self Care:     - CURRENT STATUS: CK - 40%-59% impaired, limited or restricted    - GOAL STATUS: CJ - 20%-39% impaired, limited or restricted    - D/C STATUS:  ---------------To be determined---------------       Medical Necessity:   · Patient demonstrates good rehab potential due to higher previous functional level. Reason for Services/Other Comments:  · Patient continues to require skilled intervention due to ongoing goals noted above. Use of outcome tool(s) and clinical judgement create a POC that gives a: Questionable prediction of patient's progress: MODERATE COMPLEXITY   TREATMENT:   (In addition to Assessment/Re-Assessment sessions the following treatments were rendered)  Pre-treatment Symptoms/Complaints: States he has been going to the bathroom more this week. \"it just hasn't been a good week\". Slept for 4.5 hrs without waking last night. Lately avg bt 3-4 ppd  Pain: Initial:   Pain Intensity 1: 0 0 Post Session:  0     THERAPEUTIC EXERCISE: ( 55 minutes):  Exercises per grid below to improve strength and coordination. Required minimal verbal and tactile cues to promote proper body mechanics and promote proper body breathing techniques. Progressed resistance and repetitions as indicated.     Date:  2/14/17 Date:  3/1/17 Date:  3/8/17   Activity/Exercise Parameters Parameters Parameters   Isometric PFmm See below See below See below   Bridge with ball squeeze 8x 10x    Bridge with isometric ER  GTB 10x    Sidelying clam GTB 8x GTB 2x10    sidestepping  1 lap GTB    Monster walks      squats  GTB 8x GTB 10x   Sit to stand mechanics with kegel  8'    D1 trunk rotation w GTB   10x bilat      Exercises:    Biofeedback used 10/10, 2/4, modulation ex's for improved strength and coordination of mm  Patient instructed in pelvic floor exercises listed below:  kegels 5 sec hold 10x TID  3/1/17 - progressed to 10 sec 10x TID at home  The following educational topics were reviewed with patient:  Bladder health, tips to control urge, bladder diary, pelvic floor anatomy, how foods affect bladder, bladder retraining. · Treatment/Session Assessment: improving with PFmm isolation. Used electrode on TA today with minimal accessory usage. Discussed drinking more water. Possible cause of backtracking could be dehydration and drinking less to control leaking. · Response to Treatment:  good  · Compliance with Program/Exercises: Will assess as treatment progresses. · Recommendations/Intent for next treatment session: \"Next visit will focus on advancements to more challenging activities\".   Total Treatment Duration:  PT Patient Time In/Time Out  Time In: 1230  Time Out: Mauro Bojorquez [address] [employer]

## 2017-03-15 ENCOUNTER — HOSPITAL ENCOUNTER (OUTPATIENT)
Dept: PHYSICAL THERAPY | Age: 83
Discharge: HOME | End: 2017-03-15
Attending: UROLOGY
Payer: MEDICARE

## 2017-03-15 DIAGNOSIS — R32 URINARY INCONTINENCE, UNSPECIFIED TYPE: ICD-10-CM

## 2017-03-15 PROCEDURE — 97110 THERAPEUTIC EXERCISES: CPT

## 2017-03-15 NOTE — PROGRESS NOTES
Siri Alessandro  : [facility] [address], [address] [place] [address],[address], [place] [address].  [phone]   [phone]          OUTPATIENT PHYSICAL THERAPY:Daily Note 3/15/2017    ICD-10: Treatment Diagnosis:  Mixed incontinence (N39.46)  Precautions/Allergies:   Simvastatin and Statins-hmg-coa reductase inhibitors   Fall Risk Score: 2 (? 5 = High Risk)  MD Orders: Evaluate and treat MEDICAL/REFERRING DIAGNOSIS:  Urinary incontinence, unspecified type [R32]   DATE OF ONSET: 2016  REFERRING PHYSICIAN: Sunshine Aiken MD  RETURN PHYSICIAN APPOINTMENT: unknown   REASSESSMENT: Pt has been seen for PT 4x since initial evaluation on 17 and demonstrates improvement in strength and endurance as noted with biofeedback. Use of ppd has decreased from 4-5 to avg of 3. Progress has fluctuated with most recent setback occurring on . This was possibly caused by pt's recent decrease in fluid intake leading to mild dehydration. Pt will benefit from continued PT to work towards meeting ongoing goals. INITIAL ASSESSMENT:  Mr. Armando Zacarias presents with symptoms of mixed incontinence. He demonstrates decreased strength and endurance of PFmm, as noted with biofeedback, likely contributing to the stress incontinence he has felt since his TURP procedure 2016. Previous prostate procedures, prior radiation to treat Prostate CA, as well as age are likely precipitating factors. Pt initially demonstrated assessory muscle use of abdominals and breath holding when performing kegels but this improved greatly with verbal and tactile cues. He was also educated on bladder irritants and recommended fluid intake for improved health of bladder, as well as urge suppression techniques. Pt will benefit from physical therapy to address stated problems. Plan of care was discussed and agreed upon with patient and HEP was initiated. Thank you for the opportunity to work with this patient. PROBLEM LIST (Impacting functional limitations):  1. Decreased Strength  2. Decreased strength of pelvic floor which limits bladder control INTERVENTIONS PLANNED:  1. Neuromuscular re-education  2. Biofeedback as needed  3. HEP  4. Bladder retraining  5. Bladder education  6. Neuromuscular Re-education/Strengthening  7. Therapeutic Exercise/Strengthening      TREATMENT PLAN:  Effective Dates: 2/8/17 TO 5/3/2017. Frequency/Duration: 1 time a week for 12 weeks  GOALS: (Goals have been discussed and agreed upon with patient.)  Short-Term Functional Goals: Time Frame: 2 weeks  1. Patient will demonstrate independence with home exercise program.  GOAL MET 3/8/17  2. Pt will report increase in water intake to 4 cups/day for improved bladder health. GOAL IMPROVED BUT FLUCTUATES 2/8/17  Discharge Goals: Time Frame: 12 weeks  1. Pt will score 3 on NIH for overall functional improvement. IMPROVED BUT ONGOING 3/8/17  2. Pt will report decreased use of ppd to 2 for decreased social anxiety. IMPROVED BUT ONGOING 3/8/17  3. Pt will demonstrate improvement in strength and endurance to be able to hold isometric PF contraction 10 sec 10x for improved continence and decreased use of pads. GONGOING 3/8/17  4. Pt will report decreased nocturia to 2x/night for improved quality of sleep and overall health. GOAL MET LAST NIGHT BUT INCONSISTENT 3/8/17  Rehabilitation Potential For Stated Goals: Good  Regarding Carole Faye's therapy, I certify that the treatment plan above will be carried out by a therapist or under their direction. Thank you for this referral,  Jose Davis PT     Referring Physician Signature: Jennifer Panchal MD              Date                    The information in this section was collected on 2/8/17 (except where otherwise noted). HISTORY:    History of Present Injury/Illness (Reason for Referral):  Pt reports incidence of incontinence Q2 hours with a strong urge.   He also reports stress incontinence periodically with cough/sneeze and transitional movements. He took myrabetrix but did not feel it made much of a difference. Past Medical History/Comorbidities:   Mr. Pravin Ventura  has a past medical history of Anemia; Anticoagulated on Coumadin (4/22/2015); Borderline type 2 diabetes mellitus; BPH (benign prostatic hyperplasia) (7/1/2016); BPH with obstruction/lower urinary tract symptoms (7/29/2010); Carotid stenosis; Chest discomfort, tightness, pressure  (10/8/2015); Chronic diastolic (congestive) heart failure ([address] Chronic diastolic heart failure ([address] (10/8/2015); Deep vein thrombosis (DVT) ([address] (10/8/2015); Diabetes mellitus type II, controlled ([address] (4/22/2015); Diabetes mellitus type II, uncontrolled ([address] DVT (deep venous thrombosis) ([address] (4/23/2015); Elevated prostate specific antigen (PSA) (5/14/2010); Essential hypertension (8/2/2016); Ex-smoker for more than 1 year; GERD (gastroesophageal reflux disease); HTN (hypertension) (4/22/2015); Hyperlipidemia (10/8/2015); Hypoxemia (3/7/2015); Lumbar stenosis with neurogenic claudication (12/10/2014); Prostate cancer ([address] (2010); Pulmonary hypertension ([address] (4/23/2015); Spinal abscess ([address] (4/2015); Stroke [facility]); and Wound infection (4/22/2015). He also has no past medical history of Adverse effect of anesthesia; Difficult intubation; Malignant hyperthermia due to anesthesia; Nausea & vomiting; or Pseudocholinesterase deficiency. Mr. Pravin Ventura  has a past surgical history that includes urological; biopsy prostate; appendectomy (age 17); hernia repair (Right, 1's); cervical fusion (1990's); tonsillectomy (age 16); and cataract removal (Bilateral). TURP 7/2016,   Social History/Living Environment:     lives with wife  Prior Level of Function/Work/Activity:  Retired;  Knee problems hold him back from walking for exercise  Previous Treatment Approaches:          TURP 6 mos ago - incontinence started after this treatment  Radiation treatments a few yrs ago  Personal Factors:          Age:  81 y.o. Current Medications:    Current Outpatient Prescriptions:     finasteride (PROSCAR) 5 mg tablet, TAKE 1 TABLET BY MOUTH DAILY, Disp: 90 Tab, Rfl: 11    mirabegron ER (MYRBETRIQ) 50 mg ER tablet, Take 50 mg by mouth daily. , Disp: , Rfl:     amLODIPine (NORVASC) 5 mg tablet, Take 5 mg by mouth every morning., Disp: , Rfl:     ezetimibe (ZETIA) 10 mg tablet, Take 10 mg by mouth daily. , Disp: , Rfl:     metoprolol (LOPRESSOR) 100 mg tablet, Take 100 mg by mouth two (2) times a day. Indications: HYPERTENSION, Disp: , Rfl:     fluticasone (FLONASE) 50 mcg/actuation nasal spray, , Disp: , Rfl: 5    lansoprazole (PREVACID) 30 mg capsule, Take 30 mg by mouth Daily (before breakfast). Indications: GASTROESOPHAGEAL REFLUX, Disp: , Rfl:     multivitamin (ONE A DAY) tablet, Take 1 Tab by mouth daily. Holding for surgery 6/24/16, Disp: , Rfl:     allopurinol (ZYLOPRIM) 300 mg tablet, Take 100 mg by mouth every morning. Indications: GOUT, GOUTY ARTHRITIS, Disp: , Rfl:     glipiZIDE (GLUCOTROL) 5 mg tablet, Take  by mouth two (2) times a day., Disp: , Rfl:     valsartan (DIOVAN) 320 mg tablet, Take 320 mg by mouth every morning. Indications: HYPERTENSION, Disp: , Rfl:     aspirin delayed-release 81 mg tablet, Take 81 mg by mouth daily.  Holding for surgery per Dr Hutson Gip- last dose 6/23/16, Disp: , Rfl:    Date Last Reviewed:  03/15/17  Incontinence History:  PROBLEM: YES/NO: COMMENTS:   Loss of urine with coughing YES    Loss of urine with lifting  NO    Loss of urine with exercise, running NO Unknown - 'i've been slack on that bc of my knee'   Loss of urine with strong urge YES    Loss of urine with approaching the bathroom YES sometimes   Loss of urine with key in lock NO    Loss of urine as getting to toilet/removing clothing NO    Loss of urine when hearing running water NO    Have difficulty initiating a urine stream NO    Have difficulty stopping urine stream YES 'slow'   Have to strain to empty bladder NO    Dribble urine when urinating NO    Dribble urine after emptying bladder YES Very little   Experience pain with urination NO    Experience burning during urination NO    Have blood in urine NO      Voiding Patterns: Patient voids every 2 hours during the day and Q2hrs during the night. Patient reports that he empties bladder fully. Patient uses pads for bladder protection; he changes pads 4-5 times per day on a good day; 7-9 on a bad day . Fluid Intake: Patient drinks  (2 coffee; 3 tea; 2 cups water) of fluid per day. He consumes 3 cups of caffeinated beverages per day. Patient tries not to limit fluid intake to control bladder. Bowel Habits: Patient demonstrates normal bowel habits. Mobility / Self Care: indep  Personal / Social History:  · Sexually active? NO:   · Social activities restricted due to urinary incontinence? NO:        Number of Personal Factors/Comorbidities that affect the Plan of Care:    EXAMINATION:    External Observation:   · Voluntary Contraction: present  · Voluntary Relaxation: present  · Involuntary Contraction: not present  · Involuntary Relaxation: not present  SEMG evaluation (Date 2/8/17):  ·  Resting Tone: <1uV  ·  Quality of Resting Tone: normal  ·  5 Second Hold: 4 uV  ·  10 Second Hold: currently unable to hold longer than 5 sec  ·  Quality of Recruitment: Good   ·  Quality of Relaxation: Good   ·  Quality of Holding: Fair   ·  Stability of Hold: Fair   ·  Stability of Rest: Good       Body Structures Involved:  1. Nerves  2. Muscles 3. Body Functions Affected:  1. Mental  2. Genitourinary  3. Neuromusculoskeletal  4. Movement Related Activities and Participation Affected:  1. Learning and Applying Knowledge  2. General Tasks and Demands  3. Mobility  4. Self Care  5. Domestic Life  6. Interpersonal Interactions and Relationships  7.  Community, Social and [place]   Number of elements that affect the Plan of Care: CLINICAL PRESENTATION:    Presentation:  Evolving clinical presentation with changing clinical characteristics: MODERATE COMPLEXITY   CLINICAL DECISION MAKING:    Outcome Measure: Tool Used: NIH - Chronic Prostatitis Symptom Index (NIH - CPSI for Males)   Score:  Initial: 0/1/6 Most Recent: 0/3/5 (Date: 3/8/17 )   Interpretation of Score:  Pain:  Score 0 1-4 5-8 9-12 13-16 17-20 21   Modifier CH CI CJ CK CL CM CN     Urinary Symptoms:  Score 0 1 2-3 4-5 6-7 8-9 10   Modifier CH CI CJ CK CL CM CN     Quality of Life Index:  Score 0 1-2 3-4 5-7 8-9 10-11 12   Modifier CH CI CJ CK CL CM CN       ? Self Care:     - CURRENT STATUS: CK - 40%-59% impaired, limited or restricted    - GOAL STATUS: CJ - 20%-39% impaired, limited or restricted    - D/C STATUS:  ---------------To be determined---------------       Medical Necessity:   · Patient demonstrates good rehab potential due to higher previous functional level. Reason for Services/Other Comments:  · Patient continues to require skilled intervention due to ongoing goals noted above. ·    Use of outcome tool(s) and clinical judgement create a POC that gives a:    TREATMENT:    (In addition to Assessment/Re-Assessment sessions the following treatments were rendered)  Pre-treatment Symptoms/Complaints: leaking worse with snee                                Pain: Initial:   Pain Intensity 1: 0 0 Post Session:  0     THERAPEUTIC EXERCISE: ( 48 minutes):  Exercises per grid below to improve strength and coordination. Required minimal verbal and tactile cues to promote proper body mechanics and promote proper body breathing techniques. Progressed resistance and repetitions as indicated.     Date:  2/14/17 Date:  3/1/17 Date:  3/8/17 Date:  3/15/17   Activity/Exercise Parameters Parameters Parameters    Isometric PFmm See below See below See below    Bridge with ball squeeze 8x 10x     Bridge with isometric ER  GTB 10x     Sidelying clam GTB 8x GTB 2x10 sidestepping  1 lap GTB     Monster walks       squats  GTB 8x GTB 10x    Sit to stand mechanics with kegel  8'     D1 trunk rotation w GTB   10x bilat    Nustep    L3  6'   Chest pulls w TA and kegel    RTB 10x   Lat pulls supine w TA and kegel    RTB 10x                    Exercises:    Biofeedback used 10/10, 2/4, modulation ex's for improved strength and coordination of mm  Patient instructed in pelvic floor exercises listed below:  kegels 5 sec hold 10x TID  3/1/17 - progressed to 10 sec 10x TID at home  The following educational topics were reviewed with patient:  Bladder health, tips to control urge, bladder diary, pelvic floor anatomy, how foods affect bladder, bladder retraining. · Treatment/Session Assessment: good tolerance to treatment with no c/o leaking. Initiated TA w kegel . Pt feels like sneezing with the cold weather has worsened his symptoms. · Response to Treatment:  good  · Compliance with Program/Exercises: Will assess as treatment progresses. · Recommendations/Intent for next treatment session: \"Next visit will focus on advancements to more challenging activities\".   Total Treatment Duration:  PT Patient Time In/Time Out  Time In: 1230  Time Out: [address] [facility]

## 2017-03-22 ENCOUNTER — HOSPITAL ENCOUNTER (OUTPATIENT)
Dept: PHYSICAL THERAPY | Age: 83
Discharge: HOME | End: 2017-03-22
Attending: UROLOGY
Payer: MEDICARE

## 2017-03-22 DIAGNOSIS — R32 URINARY INCONTINENCE, UNSPECIFIED TYPE: ICD-10-CM

## 2017-03-22 PROCEDURE — 97110 THERAPEUTIC EXERCISES: CPT

## 2017-03-22 NOTE — PROGRESS NOTES
Rubina Mcneal  : [facility] [id number] [place] [address],[address], [facility] [address]  [phone]   [phone]          OUTPATIENT PHYSICAL THERAPY:Daily Note 3/22/2017    ICD-10: Treatment Diagnosis:  Mixed incontinence (N39.46)  Precautions/Allergies:   Simvastatin and Statins-hmg-coa reductase inhibitors   Fall Risk Score: 2 (? 5 = High Risk)  MD Orders: Evaluate and treat MEDICAL/REFERRING DIAGNOSIS:  Urinary incontinence, unspecified type [R32]   DATE OF ONSET: 2016  REFERRING PHYSICIAN: Ingrid Felix MD  RETURN PHYSICIAN APPOINTMENT: unknown   REASSESSMENT: Pt has been seen for PT 4x since initial evaluation on 17 and demonstrates improvement in strength and endurance as noted with biofeedback. Use of ppd has decreased from 4-5 to avg of 3. Progress has fluctuated with most recent setback occurring on . This was possibly caused by pt's recent decrease in fluid intake leading to mild dehydration. Pt will benefit from continued PT to work towards meeting ongoing goals. INITIAL ASSESSMENT:  Mr. Xiomy Coronel presents with symptoms of mixed incontinence. He demonstrates decreased strength and endurance of PFmm, as noted with biofeedback, likely contributing to the stress incontinence he has felt since his TURP procedure 2016. Previous prostate procedures, prior radiation to treat Prostate CA, as well as age are likely precipitating factors. Pt initially demonstrated assessory muscle use of abdominals and breath holding when performing kegels but this improved greatly with verbal and tactile cues. He was also educated on bladder irritants and recommended fluid intake for improved health of bladder, as well as urge suppression techniques. Pt will benefit from physical therapy to address stated problems. Plan of care was discussed and agreed upon with patient and HEP was initiated. Thank you for the opportunity to work with this patient. PROBLEM LIST (Impacting functional limitations):  1. Decreased Strength  2. Decreased strength of pelvic floor which limits bladder control INTERVENTIONS PLANNED:  1. Neuromuscular re-education  2. Biofeedback as needed  3. HEP  4. Bladder retraining  5. Bladder education  6. Neuromuscular Re-education/Strengthening  7. Therapeutic Exercise/Strengthening      TREATMENT PLAN:  Effective Dates: 2/8/17 TO 5/3/2017. Frequency/Duration: 1 time a week for 12 weeks  GOALS: (Goals have been discussed and agreed upon with patient.)  Short-Term Functional Goals: Time Frame: 2 weeks  1. Patient will demonstrate independence with home exercise program.  GOAL MET 3/8/17  2. Pt will report increase in water intake to 4 cups/day for improved bladder health. GOAL IMPROVED BUT FLUCTUATES 2/8/17  Discharge Goals: Time Frame: 12 weeks  1. Pt will score 3 on NIH for overall functional improvement. IMPROVED BUT ONGOING 3/8/17  2. Pt will report decreased use of ppd to 2 for decreased social anxiety. IMPROVED BUT ONGOING 3/8/17  3. Pt will demonstrate improvement in strength and endurance to be able to hold isometric PF contraction 10 sec 10x for improved continence and decreased use of pads. GONGOING 3/8/17  4. Pt will report decreased nocturia to 2x/night for improved quality of sleep and overall health. GOAL MET LAST NIGHT BUT INCONSISTENT 3/8/17  Rehabilitation Potential For Stated Goals: Good  Regarding Ajay Faye's therapy, I certify that the treatment plan above will be carried out by a therapist or under their direction. Thank you for this referral,  Keyon Martinez PT     Referring Physician Signature: Yonatan Garcia MD              Date                    The information in this section was collected on 2/8/17 (except where otherwise noted). HISTORY:    History of Present Injury/Illness (Reason for Referral):  Pt reports incidence of incontinence Q2 hours with a strong urge.   He also reports stress incontinence periodically with cough/sneeze and transitional movements. He took myrabetrix but did not feel it made much of a difference. Past Medical History/Comorbidities:   Mr. Leonard Alaniz  has a past medical history of Anemia; Anticoagulated on Coumadin (4/22/2015); Borderline type 2 diabetes mellitus; BPH (benign prostatic hyperplasia) (7/1/2016); BPH with obstruction/lower urinary tract symptoms (7/29/2010); Carotid stenosis; Chest discomfort, tightness, pressure  (10/8/2015); Chronic diastolic (congestive) heart failure ([address] Chronic diastolic heart failure ([address] (10/8/2015); Deep vein thrombosis (DVT) ([address] (10/8/2015); Diabetes mellitus type II, controlled ([address] (4/22/2015); Diabetes mellitus type II, uncontrolled ([address] DVT (deep venous thrombosis) ([address] (4/23/2015); Elevated prostate specific antigen (PSA) (5/14/2010); Essential hypertension (8/2/2016); Ex-smoker for more than 1 year; GERD (gastroesophageal reflux disease); HTN (hypertension) (4/22/2015); Hyperlipidemia (10/8/2015); Hypoxemia (3/7/2015); Lumbar stenosis with neurogenic claudication (12/10/2014); Prostate cancer ([address] (2010); Pulmonary hypertension ([address] (4/23/2015); Spinal abscess ([address] (4/2015); Stroke [facility]); and Wound infection (4/22/2015). He also has no past medical history of Adverse effect of anesthesia; Difficult intubation; Malignant hyperthermia due to anesthesia; Nausea & vomiting; or Pseudocholinesterase deficiency. Mr. Leonard Alaniz  has a past surgical history that includes urological; biopsy prostate; appendectomy (age 17); hernia repair (Right, 1's); cervical fusion (1990's); tonsillectomy (age 16); and cataract removal (Bilateral). TURP 7/2016,   Social History/Living Environment:     lives with wife  Prior Level of Function/Work/Activity:  Retired;  Knee problems hold him back from walking for exercise  Previous Treatment Approaches:          TURP 6 mos ago - incontinence started after this treatment  Radiation treatments a few yrs ago  Personal Factors:          Age:  81 y.o. Current Medications:    Current Outpatient Prescriptions:     finasteride (PROSCAR) 5 mg tablet, TAKE 1 TABLET BY MOUTH DAILY, Disp: 90 Tab, Rfl: 11    mirabegron ER (MYRBETRIQ) 50 mg ER tablet, Take 50 mg by mouth daily. , Disp: , Rfl:     amLODIPine (NORVASC) 5 mg tablet, Take 5 mg by mouth every morning., Disp: , Rfl:     ezetimibe (ZETIA) 10 mg tablet, Take 10 mg by mouth daily. , Disp: , Rfl:     metoprolol (LOPRESSOR) 100 mg tablet, Take 100 mg by mouth two (2) times a day. Indications: HYPERTENSION, Disp: , Rfl:     fluticasone (FLONASE) 50 mcg/actuation nasal spray, , Disp: , Rfl: 5    lansoprazole (PREVACID) 30 mg capsule, Take 30 mg by mouth Daily (before breakfast). Indications: GASTROESOPHAGEAL REFLUX, Disp: , Rfl:     multivitamin (ONE A DAY) tablet, Take 1 Tab by mouth daily. Holding for surgery 6/24/16, Disp: , Rfl:     allopurinol (ZYLOPRIM) 300 mg tablet, Take 100 mg by mouth every morning. Indications: GOUT, GOUTY ARTHRITIS, Disp: , Rfl:     glipiZIDE (GLUCOTROL) 5 mg tablet, Take  by mouth two (2) times a day., Disp: , Rfl:     valsartan (DIOVAN) 320 mg tablet, Take 320 mg by mouth every morning. Indications: HYPERTENSION, Disp: , Rfl:     aspirin delayed-release 81 mg tablet, Take 81 mg by mouth daily.  Holding for surgery per Dr Hutson Gip- last dose 6/23/16, Disp: , Rfl:    Date Last Reviewed:  03/22/17  Incontinence History:  PROBLEM: YES/NO: COMMENTS:   Loss of urine with coughing YES    Loss of urine with lifting  NO    Loss of urine with exercise, running NO Unknown - 'i've been slack on that bc of my knee'   Loss of urine with strong urge YES    Loss of urine with approaching the bathroom YES sometimes   Loss of urine with key in lock NO    Loss of urine as getting to toilet/removing clothing NO    Loss of urine when hearing running water NO    Have difficulty initiating a urine stream NO    Have difficulty stopping urine stream YES 'slow'   Have to strain to empty bladder NO    Dribble urine when urinating NO    Dribble urine after emptying bladder YES Very little   Experience pain with urination NO    Experience burning during urination NO    Have blood in urine NO      Voiding Patterns: Patient voids every 2 hours during the day and Q2hrs during the night. Patient reports that he empties bladder fully. Patient uses pads for bladder protection; he changes pads 4-5 times per day on a good day; 7-9 on a bad day . Fluid Intake: Patient drinks  (2 coffee; 3 tea; 2 cups water) of fluid per day. He consumes 3 cups of caffeinated beverages per day. Patient tries not to limit fluid intake to control bladder. Bowel Habits: Patient demonstrates normal bowel habits. Mobility / Self Care: indep  Personal / Social History:  · Sexually active? NO:   · Social activities restricted due to urinary incontinence? NO:        Number of Personal Factors/Comorbidities that affect the Plan of Care:    EXAMINATION:    External Observation:   · Voluntary Contraction: present  · Voluntary Relaxation: present  · Involuntary Contraction: not present  · Involuntary Relaxation: not present  SEMG evaluation (Date 2/8/17):  ·  Resting Tone: <1uV  ·  Quality of Resting Tone: normal  ·  5 Second Hold: 4 uV  ·  10 Second Hold: currently unable to hold longer than 5 sec  ·  Quality of Recruitment: Good   ·  Quality of Relaxation: Good   ·  Quality of Holding: Fair   ·  Stability of Hold: Fair   ·  Stability of Rest: Good       Body Structures Involved:  1. Nerves  2. Muscles 3. Body Functions Affected:  1. Mental  2. Genitourinary  3. Neuromusculoskeletal  4. Movement Related Activities and Participation Affected:  1. Learning and Applying Knowledge  2. General Tasks and Demands  3. Mobility  4. Self Care  5. Domestic Life  6. Interpersonal Interactions and Relationships  7.  Community, Social and [place]   Number of elements that affect the Plan of Care: CLINICAL PRESENTATION:    Presentation:  Evolving clinical presentation with changing clinical characteristics: MODERATE COMPLEXITY   CLINICAL DECISION MAKING:    Outcome Measure: Tool Used: NIH - Chronic Prostatitis Symptom Index (NIH - CPSI for Males)   Score:  Initial: 0/1/6 Most Recent: 0/3/5 (Date: 3/8/17 )   Interpretation of Score:  Pain:  Score 0 1-4 5-8 9-12 13-16 17-20 21   Modifier CH CI CJ CK CL CM CN     Urinary Symptoms:  Score 0 1 2-3 4-5 6-7 8-9 10   Modifier CH CI CJ CK CL CM CN     Quality of Life Index:  Score 0 1-2 3-4 5-7 8-9 10-11 12   Modifier CH CI CJ CK CL CM CN       ? Self Care:     - CURRENT STATUS: CK - 40%-59% impaired, limited or restricted    - GOAL STATUS: CJ - 20%-39% impaired, limited or restricted    - D/C STATUS:  ---------------To be determined---------------       Medical Necessity:   · Patient demonstrates good rehab potential due to higher previous functional level. Reason for Services/Other Comments:  · Patient continues to require skilled intervention due to ongoing goals noted above. ·    Use of outcome tool(s) and clinical judgement create a POC that gives a:    TREATMENT:    (In addition to Assessment/Re-Assessment sessions the following treatments were rendered)  Pre-treatment Symptoms/Complaints:               Feels coricidin aggravated his symptoms. Otherwise feels he has been doing well. 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 Reports 3ppd on a normal day but has been experiencing set backs. Pain: Initial:   Pain Intensity 1: 0 0 Post Session:  0     THERAPEUTIC EXERCISE: ( 55 minutes):  Exercises per grid below to improve strength and coordination. Required minimal verbal and tactile cues to promote proper body mechanics and promote proper body breathing techniques. Progressed resistance and repetitions as indicated.     Date:  2/14/17 Date:  3/1/17 Date:  3/8/17 Date:  3/15/17 Date:   3/22/17 Activity/Exercise Parameters Parameters Parameters     Isometric PFmm See below See below See below     Bridge with ball squeeze 8x 10x      Bridge with isometric ER  GTB 10x      Sidelying clam GTB 8x GTB 2x10      sidestepping  1 lap GTB      Monster walks        squats  GTB 8x GTB 10x  GTB 10x   Sit to stand mechanics with kegel  8'      D1 trunk rotation w GTB   10x bilat     Nustep    L3  6' L3  6'   Chest pulls w TA and kegel    RTB 10x    Lat pulls supine w TA and kegel    RTB 10x    Crunch with splinting to correct diastasis recti     2x10              Exercises:    Biofeedback used 10/10, 2/4, modulation ex's for improved strength and coordination of mm  Patient instructed in pelvic floor exercises listed below:  kegels 5 sec hold 10x TID  3/1/17 - progressed to 10 sec 10x TID at home  The following educational topics were reviewed with patient:  Bladder health, tips to control urge, bladder diary, pelvic floor anatomy, how foods affect bladder, bladder retraining. · Treatment/Session Assessment:added crunch with splint to correct diastasis recti. Tolerated all well. Next visit in 2 weeks. Pt fluctuating a lot at home with number of pads used and exacerbating factors. Will determine in 2 weeks if continue a few additional visits with increased spacing between visits or discharge to [facility]. · Response to Treatment:  good  · Compliance with Program/Exercises: Will assess as treatment progresses. · Recommendations/Intent for next treatment session: \"Next visit will focus on advancements to more challenging activities\".   Total Treatment Duration:  PT Patient Time In/Time Out  Time In: 1230  Time Out: Mauro Bojorquez [address]

## 2017-04-04 ENCOUNTER — APPOINTMENT (OUTPATIENT)
Dept: PHYSICAL THERAPY | Age: 83
End: 2017-04-04
Attending: UROLOGY
Payer: MEDICARE

## 2017-04-19 ENCOUNTER — HOSPITAL ENCOUNTER (OUTPATIENT)
Dept: PHYSICAL THERAPY | Age: 83
Discharge: HOME | End: 2017-04-19
Attending: UROLOGY
Payer: MEDICARE

## 2017-04-19 PROCEDURE — G8989 SELF CARE D/C STATUS: HCPCS

## 2017-04-19 PROCEDURE — 97110 THERAPEUTIC EXERCISES: CPT

## 2017-04-19 PROCEDURE — G8988 SELF CARE GOAL STATUS: HCPCS

## 2017-04-19 NOTE — PROGRESS NOTES
Shakila Alize  : [facility] [address], [address] [place] [address],[address], [address] [facility]  [phone]   [phone]          OUTPATIENT PHYSICAL THERAPY:Daily Note and Discharge 2017    ICD-10: Treatment Diagnosis:  Mixed incontinence (N39.46)  Precautions/Allergies:   Simvastatin and Statins-hmg-coa reductase inhibitors   Fall Risk Score: 2 (? 5 = High Risk)  MD Orders: Evaluate and treat MEDICAL/REFERRING DIAGNOSIS:  Unspecified urinary incontinence [R32]   DATE OF ONSET: 2016  REFERRING PHYSICIAN: Julia Nathan MD  RETURN PHYSICIAN APPOINTMENT: unknown   REASSESSMENT: Pt has been seen for PT 7x since initial evaluation on 17. He has demonstrated mild improvement in continence as noted with number of pads used per day, decreasing from 4-5 to avg of 3. He has been educated on bladder irritants as well as importance of proper hydration, however continues to drink quite a bit of coffee and tea. Pt is independent with strengthening HEP for PFmm and is opting to continue working at home. He was encouraged to call with any additional questions or concerns. Thank you for your referral.  Pt will be discharged at this point. INITIAL ASSESSMENT:  Mr. Gerald Kovacs presents with symptoms of mixed incontinence. He demonstrates decreased strength and endurance of PFmm, as noted with biofeedback, likely contributing to the stress incontinence he has felt since his TURP procedure 2016. Previous prostate procedures, prior radiation to treat Prostate CA, as well as age are likely precipitating factors. Pt initially demonstrated assessory muscle use of abdominals and breath holding when performing kegels but this improved greatly with verbal and tactile cues. He was also educated on bladder irritants and recommended fluid intake for improved health of bladder, as well as urge suppression techniques.   Pt will benefit from physical therapy to address stated problems. Plan of care was discussed and agreed upon with patient and HEP was initiated. Thank you for the opportunity to work with this patient. PROBLEM LIST (Impacting functional limitations):  1. Decreased Strength  2. Decreased strength of pelvic floor which limits bladder control INTERVENTIONS PLANNED:  1. Neuromuscular re-education  2. Biofeedback as needed  3. HEP  4. Bladder retraining  5. Bladder education  6. Neuromuscular Re-education/Strengthening  7. Therapeutic Exercise/Strengthening      TREATMENT PLAN:  Effective Dates: 2/8/17 TO 5/3/2017. Frequency/Duration: 1 time a week for 12 weeks  GOALS: (Goals have been discussed and agreed upon with patient.)  Short-Term Functional Goals: Time Frame: 2 weeks  1. Patient will demonstrate independence with home exercise program.  GOAL MET 3/8/17  2. Pt will report increase in water intake to 4 cups/day for improved bladder health. GOAL MET 4/19/17  Discharge Goals: Time Frame: 12 weeks  1. Pt will score 3 on NIH for overall functional improvement. GOAL NOT MET.  4/19/17  2. Pt will report decreased use of ppd to 2 for decreased social anxiety. GOAL NOT MET 4/19/17  3. Pt will demonstrate improvement in strength and endurance to be able to hold isometric PF contraction 10 sec 10x for improved continence and decreased use of pads. GOAL MET 4/19/17  4. Pt will report decreased nocturia to 2x/night for improved quality of sleep and overall health. GOAL NOT MET 4/19/17  Rehabilitation Potential For Stated Goals: Good  Regarding Jil Faye's therapy, I certify that the treatment plan above will be carried out by a therapist or under their direction. Thank you for this referral,  Linda Muller PT     Referring Physician Signature: Royal Ba MD              Date                    The information in this section was collected on 2/8/17 (except where otherwise noted).     HISTORY:    History of Present Injury/Illness (Reason for Referral):  Pt reports incidence of incontinence Q2 hours with a strong urge. He also reports stress incontinence periodically with cough/sneeze and transitional movements. He took myrabetrix but did not feel it made much of a difference. Past Medical History/Comorbidities:   Mr. Lan Pimentel  has a past medical history of Anemia; Anticoagulated on Coumadin (4/22/2015); Borderline type 2 diabetes mellitus; BPH (benign prostatic hyperplasia) (7/1/2016); BPH with obstruction/lower urinary tract symptoms (7/29/2010); Carotid stenosis; Chest discomfort, tightness, pressure  (10/8/2015); Chronic diastolic (congestive) heart failure ([address] Chronic diastolic heart failure ([address] (10/8/2015); Deep vein thrombosis (DVT) ([address] (10/8/2015); Diabetes mellitus type II, controlled ([address] (4/22/2015); Diabetes mellitus type II, uncontrolled ([address] DVT (deep venous thrombosis) ([address] (4/23/2015); Elevated prostate specific antigen (PSA) (5/14/2010); Essential hypertension (8/2/2016); Ex-smoker for more than 1 year; GERD (gastroesophageal reflux disease); HTN (hypertension) (4/22/2015); Hyperlipidemia (10/8/2015); Hypoxemia (3/7/2015); Lumbar stenosis with neurogenic claudication (12/10/2014); Prostate cancer ([address] (2010); Pulmonary hypertension ([address] (4/23/2015); Spinal abscess ([address] (4/2015); Stroke [facility]); and Wound infection (4/22/2015). He also has no past medical history of Adverse effect of anesthesia; Difficult intubation; Malignant hyperthermia due to anesthesia; Nausea & vomiting; or Pseudocholinesterase deficiency. Mr. Lan Pimentel  has a past surgical history that includes urological; biopsy prostate; appendectomy (age 17); hernia repair (Right, 1's); cervical fusion (1990's); tonsillectomy (age 16); and cataract removal (Bilateral). TURP 7/2016,   Social History/Living Environment:     lives with wife  Prior Level of Function/Work/Activity:  Retired;  Knee problems hold him back from walking for exercise  Previous Treatment Approaches:          TURP 6 mos ago - incontinence started after this treatment  Radiation treatments a few yrs ago  Personal Factors:          Age:  81 y.o. Current Medications:    Current Outpatient Prescriptions:     finasteride (PROSCAR) 5 mg tablet, TAKE 1 TABLET BY MOUTH DAILY, Disp: 90 Tab, Rfl: 11    mirabegron ER (MYRBETRIQ) 50 mg ER tablet, Take 50 mg by mouth daily. , Disp: , Rfl:     amLODIPine (NORVASC) 5 mg tablet, Take 5 mg by mouth every morning., Disp: , Rfl:     ezetimibe (ZETIA) 10 mg tablet, Take 10 mg by mouth daily. , Disp: , Rfl:     metoprolol (LOPRESSOR) 100 mg tablet, Take 100 mg by mouth two (2) times a day. Indications: HYPERTENSION, Disp: , Rfl:     fluticasone (FLONASE) 50 mcg/actuation nasal spray, , Disp: , Rfl: 5    lansoprazole (PREVACID) 30 mg capsule, Take 30 mg by mouth Daily (before breakfast). Indications: GASTROESOPHAGEAL REFLUX, Disp: , Rfl:     multivitamin (ONE A DAY) tablet, Take 1 Tab by mouth daily. Holding for surgery 6/24/16, Disp: , Rfl:     allopurinol (ZYLOPRIM) 300 mg tablet, Take 100 mg by mouth every morning. Indications: GOUT, GOUTY ARTHRITIS, Disp: , Rfl:     glipiZIDE (GLUCOTROL) 5 mg tablet, Take  by mouth two (2) times a day., Disp: , Rfl:     valsartan (DIOVAN) 320 mg tablet, Take 320 mg by mouth every morning. Indications: HYPERTENSION, Disp: , Rfl:     aspirin delayed-release 81 mg tablet, Take 81 mg by mouth daily.  Holding for surgery per Dr Cari Gaviria- last dose 6/23/16, Disp: , Rfl:    Date Last Reviewed:  04/19/17  Incontinence History:  PROBLEM: YES/NO: COMMENTS:   Loss of urine with coughing YES    Loss of urine with lifting  NO    Loss of urine with exercise, running NO Unknown - 'i've been slack on that bc of my knee'   Loss of urine with strong urge YES    Loss of urine with approaching the bathroom YES sometimes   Loss of urine with key in lock NO    Loss of urine as getting to toilet/removing clothing NO    Loss of urine when hearing running water NO    Have difficulty initiating a urine stream NO    Have difficulty stopping urine stream YES 'slow'   Have to strain to empty bladder NO    Dribble urine when urinating NO    Dribble urine after emptying bladder YES Very little   Experience pain with urination NO    Experience burning during urination NO    Have blood in urine NO      Voiding Patterns: Patient voids every 2 hours during the day and Q2hrs during the night. Patient reports that he empties bladder fully. Patient uses pads for bladder protection; he changes pads 4-5 times per day on a good day; 7-9 on a bad day . Fluid Intake: Patient drinks  (2 coffee; 3 tea; 2 cups water) of fluid per day. He consumes 3 cups of caffeinated beverages per day. Patient tries not to limit fluid intake to control bladder. Bowel Habits: Patient demonstrates normal bowel habits. Mobility / Self Care: indep  Personal / Social History:  · Sexually active? NO:   · Social activities restricted due to urinary incontinence? NO:        Number of Personal Factors/Comorbidities that affect the Plan of Care:    EXAMINATION:    External Observation:   · Voluntary Contraction: present  · Voluntary Relaxation: present  · Involuntary Contraction: not present  · Involuntary Relaxation: not present  SEMG evaluation (Date 2/8/17):  ·  Resting Tone: <1uV  ·  Quality of Resting Tone: normal  ·  5 Second Hold: 4 uV  ·  10 Second Hold: currently unable to hold longer than 5 sec  ·  Quality of Recruitment: Good   ·  Quality of Relaxation: Good   ·  Quality of Holding: Fair   ·  Stability of Hold: Fair   ·  Stability of Rest: Good       Body Structures Involved:  1. Nerves  2. Muscles  Body Functions Affected:  1. Mental  2. Genitourinary  3. Neuromusculoskeletal  4. Movement Related Activities and Participation Affected:  1. Learning and Applying Knowledge  2. General Tasks and Demands  3. Mobility  4. Self Care  5. Domestic Life  6.  Interpersonal Interactions and Relationships  7. Community, Social and [place]   Number of elements that affect the Plan of Care:    CLINICAL PRESENTATION:    Presentation:  Evolving clinical presentation with changing clinical characteristics: MODERATE COMPLEXITY   CLINICAL DECISION MAKING:    Outcome Measure: Tool Used: NIH - Chronic Prostatitis Symptom Index (NIH - CPSI for Males)   Score:  Initial: 0/1/6 Most Recent: 0/1/5 (Date: 4/19/17 )   Interpretation of Score:  Pain:  Score 0 1-4 5-8 9-12 13-16 17-20 21   Modifier CH CI CJ CK CL CM CN     Urinary Symptoms:  Score 0 1 2-3 4-5 6-7 8-9 10   Modifier CH CI CJ CK CL CM CN     Quality of Life Index:  Score 0 1-2 3-4 5-7 8-9 10-11 12   Modifier CH CI CJ CK CL CM CN       ? Self Care:     - CURRENT STATUS: CK - 40%-59% impaired, limited or restricted    - GOAL STATUS: CJ - 20%-39% impaired, limited or restricted    - D/C STATUS:  CK - 40%-59% impaired, limited or restricted       Medical Necessity:   · Patient demonstrates good rehab potential due to higher previous functional level. Reason for Services/Other Comments:  · Patient continues to require skilled intervention due to ongoing goals noted above. ·    Use of outcome tool(s) and clinical judgement create a POC that gives a:    TREATMENT:    (In addition to Assessment/Re-Assessment sessions the following treatments were rendered)  Pre-treatment Symptoms/Complaints:               Feels coricidin aggravated his symptoms. Otherwise feels he has been doing well. 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 Reports 3ppd on a normal day but has been experiencing set backs. Pain: Initial:   Pain Intensity 1: 0 0 Post Session:  0     THERAPEUTIC EXERCISE: ( 55 minutes):  Exercises per grid below to improve strength and coordination. Required minimal verbal and tactile cues to promote proper body mechanics and promote proper body breathing techniques.   Progressed resistance and repetitions as indicated. Date:   3/22/17 Date:  4/19/17   Activity/Exercise     Isometric PFmm     Bridge with ball squeeze  10x   Bridge with isometric ER  10x   Sidelying clam  10x GTB   sidestepping  1 lap GTB   Monster walks     squats GTB 10x 10x GTB   Sit to stand mechanics with kegel     D1 trunk rotation w GTB     Nustep L3  6' L4 5'   Chest pulls w TA and kegel     Lat pulls supine w TA and kegel     Crunch with splinting to correct diastasis recti 2x10 10x           Exercises:    Biofeedback used 10/10, 2/4, modulation ex's for improved strength and coordination of mm  Patient instructed in pelvic floor exercises listed below:  kegels 5 sec hold 10x TID  3/1/17 - progressed to 10 sec 10x TID at home  The following educational topics were reviewed with patient:  Bladder health, tips to control urge, bladder diary, pelvic floor anatomy, how foods affect bladder, bladder retraining. · Treatment/Session Assessment:tolerated all well. Independent with HEP  · Response to Treatment:  good  · Compliance with Program/Exercises: Will assess as treatment progresses. · Recommendations/Intent for next treatment session: \"Next visit will focus on advancements to more challenging activities\".   Total Treatment Duration:  PT Patient Time In/Time Out  Time In: 1515  Time Out: 201 Marcela Berrios, PT

## 2017-08-17 PROBLEM — I11.0 HYPERTENSIVE HEART DISEASE WITH CHF (HCC): Status: ACTIVE | Noted: 2017-08-17

## 2017-09-01 ENCOUNTER — APPOINTMENT (RX ONLY)
Dept: URBAN - METROPOLITAN AREA CLINIC 349 | Facility: CLINIC | Age: 83
Setting detail: DERMATOLOGY
End: 2017-09-01

## 2017-09-01 DIAGNOSIS — L82.1 OTHER SEBORRHEIC KERATOSIS: ICD-10-CM

## 2017-09-01 DIAGNOSIS — L57.0 ACTINIC KERATOSIS: ICD-10-CM

## 2017-09-01 DIAGNOSIS — L82.0 INFLAMED SEBORRHEIC KERATOSIS: ICD-10-CM

## 2017-09-01 PROBLEM — L29.8 OTHER PRURITUS: Status: ACTIVE | Noted: 2017-09-01

## 2017-09-01 PROCEDURE — ? LIQUID NITROGEN

## 2017-09-01 PROCEDURE — 17000 DESTRUCT PREMALG LESION: CPT | Mod: 59

## 2017-09-01 PROCEDURE — ? COUNSELING

## 2017-09-01 PROCEDURE — 99213 OFFICE O/P EST LOW 20 MIN: CPT | Mod: 25

## 2017-09-01 PROCEDURE — 17110 DESTRUCTION B9 LES UP TO 14: CPT

## 2017-09-01 PROCEDURE — 17003 DESTRUCT PREMALG LES 2-14: CPT

## 2017-09-01 ASSESSMENT — LOCATION DETAILED DESCRIPTION DERM
LOCATION DETAILED: LEFT SUPERIOR PARIETAL SCALP
LOCATION DETAILED: LEFT SUPERIOR LATERAL MALAR CHEEK
LOCATION DETAILED: STERNUM
LOCATION DETAILED: RIGHT CENTRAL FRONTAL SCALP
LOCATION DETAILED: RIGHT SUPERIOR PARIETAL SCALP
LOCATION DETAILED: RIGHT PROXIMAL LATERAL PRETIBIAL REGION
LOCATION DETAILED: RIGHT DISTAL DORSAL FOREARM
LOCATION DETAILED: LEFT SUPERIOR HELIX
LOCATION DETAILED: LEFT INFERIOR LATERAL FOREHEAD
LOCATION DETAILED: LEFT CENTRAL TEMPLE
LOCATION DETAILED: RIGHT LATERAL PROXIMAL CALF
LOCATION DETAILED: LEFT MEDIAL FRONTAL SCALP
LOCATION DETAILED: LEFT MEDIAL INFERIOR CHEST
LOCATION DETAILED: LEFT PROXIMAL DORSAL FOREARM

## 2017-09-01 ASSESSMENT — LOCATION SIMPLE DESCRIPTION DERM
LOCATION SIMPLE: RIGHT LOWER LEG
LOCATION SIMPLE: RIGHT FOREARM
LOCATION SIMPLE: SCALP
LOCATION SIMPLE: LEFT EAR
LOCATION SIMPLE: LEFT FOREARM
LOCATION SIMPLE: LEFT FOREHEAD
LOCATION SIMPLE: CHEST
LOCATION SIMPLE: LEFT CHEEK
LOCATION SIMPLE: RIGHT SCALP
LOCATION SIMPLE: LEFT SCALP
LOCATION SIMPLE: LEFT TEMPLE

## 2017-09-01 ASSESSMENT — LOCATION ZONE DERM
LOCATION ZONE: TRUNK
LOCATION ZONE: ARM
LOCATION ZONE: FACE
LOCATION ZONE: LEG
LOCATION ZONE: EAR
LOCATION ZONE: SCALP

## 2017-09-01 ASSESSMENT — PAIN INTENSITY VAS: HOW INTENSE IS YOUR PAIN 0 BEING NO PAIN, 10 BEING THE MOST SEVERE PAIN POSSIBLE?: NO PAIN

## 2017-09-01 NOTE — PROCEDURE: LIQUID NITROGEN
Include Z78.9 (Other Specified Conditions Influencing Health Status) As An Associated Diagnosis?: Yes
Duration Of Freeze Thaw-Cycle (Seconds): 3
Post-Care Instructions: I reviewed with the patient in detail post-care instructions. Patient is to wear sunprotection, and avoid picking at any of the treated lesions. Pt may apply Vaseline to crusted or scabbing areas.
Detail Level: Detailed
Detail Level: Zone
Render Post-Care Instructions In Note?: no
Medical Necessity Clause: This procedure was medically necessary because the lesions that were treated were:
Consent: The patient's consent was obtained including but not limited to risks of crusting, scabbing, blistering, scarring, darker or lighter pigmentary change, recurrence, incomplete removal and infection.
Number Of Freeze-Thaw Cycles: 2 freeze-thaw cycles
Number Of Freeze-Thaw Cycles: 1 freeze-thaw cycle
Medical Necessity Information: It is in your best interest to select a reason for this procedure from the list below. All of these items fulfill various CMS LCD requirements except the new and changing color options.

## 2018-10-03 ENCOUNTER — APPOINTMENT (RX ONLY)
Dept: URBAN - METROPOLITAN AREA CLINIC 349 | Facility: CLINIC | Age: 84
Setting detail: DERMATOLOGY
End: 2018-10-03

## 2018-10-03 DIAGNOSIS — L82.1 OTHER SEBORRHEIC KERATOSIS: ICD-10-CM

## 2018-10-03 DIAGNOSIS — L82.0 INFLAMED SEBORRHEIC KERATOSIS: ICD-10-CM

## 2018-10-03 DIAGNOSIS — L57.0 ACTINIC KERATOSIS: ICD-10-CM

## 2018-10-03 PROCEDURE — 99213 OFFICE O/P EST LOW 20 MIN: CPT | Mod: 25

## 2018-10-03 PROCEDURE — ? LIQUID NITROGEN

## 2018-10-03 PROCEDURE — 17110 DESTRUCTION B9 LES UP TO 14: CPT | Mod: 59

## 2018-10-03 PROCEDURE — ? COUNSELING

## 2018-10-03 PROCEDURE — 17004 DESTROY PREMAL LESIONS 15/>: CPT

## 2018-10-03 ASSESSMENT — LOCATION SIMPLE DESCRIPTION DERM
LOCATION SIMPLE: LEFT ANTERIOR NECK
LOCATION SIMPLE: RIGHT FOREHEAD
LOCATION SIMPLE: CHEST
LOCATION SIMPLE: RIGHT EAR
LOCATION SIMPLE: RIGHT EAR
LOCATION SIMPLE: RIGHT UPPER ARM
LOCATION SIMPLE: LEFT UPPER BACK
LOCATION SIMPLE: RIGHT FOREARM
LOCATION SIMPLE: LEFT UPPER BACK
LOCATION SIMPLE: LEFT ZYGOMA
LOCATION SIMPLE: RIGHT SCALP
LOCATION SIMPLE: SCALP
LOCATION SIMPLE: LEFT FOREHEAD
LOCATION SIMPLE: ABDOMEN
LOCATION SIMPLE: RIGHT UPPER ARM
LOCATION SIMPLE: LEFT SCALP

## 2018-10-03 ASSESSMENT — LOCATION ZONE DERM
LOCATION ZONE: ARM
LOCATION ZONE: FACE
LOCATION ZONE: EAR
LOCATION ZONE: TRUNK
LOCATION ZONE: ARM
LOCATION ZONE: SCALP
LOCATION ZONE: EAR
LOCATION ZONE: TRUNK
LOCATION ZONE: NECK

## 2018-10-03 ASSESSMENT — LOCATION DETAILED DESCRIPTION DERM
LOCATION DETAILED: RIGHT SUPERIOR CRUS OF ANTIHELIX
LOCATION DETAILED: LEFT MID-UPPER BACK
LOCATION DETAILED: RIGHT CENTRAL FRONTAL SCALP
LOCATION DETAILED: LEFT MID-UPPER BACK
LOCATION DETAILED: LEFT CLAVICULAR NECK
LOCATION DETAILED: EPIGASTRIC SKIN
LOCATION DETAILED: RIGHT SUPERIOR MEDIAL FOREHEAD
LOCATION DETAILED: LEFT CENTRAL FRONTAL SCALP
LOCATION DETAILED: RIGHT SUPERIOR FOREHEAD
LOCATION DETAILED: RIGHT ANTERIOR DISTAL UPPER ARM
LOCATION DETAILED: STERNUM
LOCATION DETAILED: RIGHT SUPERIOR PARIETAL SCALP
LOCATION DETAILED: RIGHT PROXIMAL DORSAL FOREARM
LOCATION DETAILED: RIGHT MEDIAL FRONTAL SCALP
LOCATION DETAILED: LEFT MEDIAL INFERIOR CHEST
LOCATION DETAILED: LEFT MEDIAL ZYGOMA
LOCATION DETAILED: LEFT INFERIOR UPPER BACK
LOCATION DETAILED: LEFT SUPERIOR PARIETAL SCALP
LOCATION DETAILED: RIGHT ANTERIOR DISTAL UPPER ARM
LOCATION DETAILED: LEFT SUPERIOR FOREHEAD
LOCATION DETAILED: RIGHT SUPERIOR HELIX
LOCATION DETAILED: LEFT MEDIAL FRONTAL SCALP

## 2018-10-03 NOTE — PROCEDURE: LIQUID NITROGEN
Render Post-Care Instructions In Note?: no
Medical Necessity Clause: This procedure was medically necessary because the lesions that were treated were:
Medical Necessity Information: It is in your best interest to select a reason for this procedure from the list below. All of these items fulfill various CMS LCD requirements except the new and changing color options.
Detail Level: Detailed
Consent: The patient's consent was obtained including but not limited to risks of crusting, scabbing, blistering, scarring, darker or lighter pigmentary change, recurrence, incomplete removal and infection.
Number Of Freeze-Thaw Cycles: 2 freeze-thaw cycles
Post-Care Instructions: I reviewed with the patient in detail post-care instructions. Patient is to wear sunprotection, and avoid picking at any of the treated lesions. Pt may apply Vaseline to crusted or scabbing areas.
Include Z78.9 (Other Specified Conditions Influencing Health Status) As An Associated Diagnosis?: Yes
Number Of Freeze-Thaw Cycles: 1 freeze-thaw cycle
Detail Level: Zone
Duration Of Freeze Thaw-Cycle (Seconds): 3

## 2018-11-25 ENCOUNTER — HOSPITAL ENCOUNTER (EMERGENCY)
Age: 84
Discharge: HOME | End: 2018-11-25
Attending: EMERGENCY MEDICINE
Payer: MEDICARE

## 2018-11-25 ENCOUNTER — APPOINTMENT (OUTPATIENT)
Dept: CT IMAGING | Age: 84
End: 2018-11-25
Attending: NURSE PRACTITIONER
Payer: MEDICARE

## 2018-11-25 ENCOUNTER — APPOINTMENT (OUTPATIENT)
Dept: GENERAL RADIOLOGY | Age: 84
End: 2018-11-25
Attending: EMERGENCY MEDICINE
Payer: MEDICARE

## 2018-11-25 VITALS
DIASTOLIC BLOOD PRESSURE: 74 MMHG | WEIGHT: 212 LBS | HEART RATE: 88 BPM | HEIGHT: 72 IN | RESPIRATION RATE: 18 BRPM | SYSTOLIC BLOOD PRESSURE: 152 MMHG | BODY MASS INDEX: 28.71 KG/M2 | OXYGEN SATURATION: 96 % | TEMPERATURE: 98.7 F

## 2018-11-25 DIAGNOSIS — S62.667A CLOSED NONDISPLACED FRACTURE OF DISTAL PHALANX OF LEFT LITTLE FINGER, INITIAL ENCOUNTER: ICD-10-CM

## 2018-11-25 DIAGNOSIS — S00.81XA ABRASION OF FOREHEAD, INITIAL ENCOUNTER: ICD-10-CM
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
## 2018-11-25 PROCEDURE — 90471 IMMUNIZATION ADMIN: CPT | Performed by: NURSE PRACTITIONER

## 2018-11-25 PROCEDURE — 73130 X-RAY EXAM OF HAND: CPT

## 2018-11-25 PROCEDURE — 77030002916 HC SUT ETHLN J&J -A: Performed by: NURSE PRACTITIONER

## 2018-11-25 PROCEDURE — 75810000283 HC INJECTION NERVE BLOCK: Performed by: NURSE PRACTITIONER

## 2018-11-25 PROCEDURE — 90715 TDAP VACCINE 7 YRS/> IM: CPT | Performed by: NURSE PRACTITIONER

## 2018-11-25 PROCEDURE — 75810000053 HC SPLINT APPLICATION: Performed by: NURSE PRACTITIONER

## 2018-11-25 PROCEDURE — 70450 CT HEAD/BRAIN W/O DYE: CPT

## 2018-11-25 PROCEDURE — 74011250636 HC RX REV CODE- 250/636: Performed by: NURSE PRACTITIONER

## 2018-11-25 PROCEDURE — 77030031139 HC SUT VCRL2 J&J -A: Performed by: NURSE PRACTITIONER

## 2018-11-25 PROCEDURE — 99283 EMERGENCY DEPT VISIT LOW MDM: CPT | Performed by: NURSE PRACTITIONER

## 2018-11-25 PROCEDURE — 75810000294 HC INTERM/LAYERED WND RPR: Performed by: NURSE PRACTITIONER

## 2018-11-25 RX ORDER — CEPHALEXIN 500 MG/1
500 CAPSULE ORAL 4 TIMES DAILY
Qty: 28 CAP | Refills: 0 | Status: SHIPPED | OUTPATIENT
Start: 2018-11-25 | End: 2018-12-02

## 2018-11-25 RX ADMIN — TETANUS TOXOID, REDUCED DIPHTHERIA TOXOID AND ACELLULAR PERTUSSIS VACCINE, ADSORBED 0.5 ML: 5; 2.5; 8; 8; 2.5 SUSPENSION INTRAMUSCULAR at 16:20

## 2018-11-25 NOTE — ED NOTES
Pt presents to ED after sustaining fall described as \"tripped over my own feet while I was carrying a battery to put in my truck. Patient has laceration and obvious deformity of left 5th digit and an abrasion to his forehead. Patient denies LOC before and after fall, denies changes in vision, headache, nausea, vomiting.  Pt A&0 x 4

## 2018-11-25 NOTE — DISCHARGE INSTRUCTIONS
Call orthopedics in the morning to schedule a follow up appointment. Antibiotic ointment to the area. You can changed the dressing tomorrow. Keflex as prescribed  Return to the Emergency Department for any new or worse symptoms.

## 2018-11-25 NOTE — ED NOTES
Patient was carrying heavy battery, lost his balance and fell with battery in hand, abrasion to forehead however main complaint is open wound to 5th digit of left hand with deformity noted with angulation present. Patient does not feel tip of finger when touched. Dr. Ernie Rosario advised of patient. No loss of consciousness during event.

## 2018-11-25 NOTE — ED NOTES
I have reviewed discharge instructions with the patient and spouse. The patient and spouse verbalized understanding. Patient left ED via Discharge Method: ambulatory to Home with self. Opportunity for questions and clarification provided. Patient given 1 scripts. To continue your aftercare when you leave the hospital, you may receive an automated call from our care team to check in on how you are doing. This is a free service and part of our promise to provide the best care and service to meet your aftercare needs.  If you have questions, or wish to unsubscribe from this service please call [phone]. Thank you for Choosing our [facility] Emergency Department.

## 2018-11-25 NOTE — ED PROVIDER NOTES
Patient states he was carrying a car batter when he fell hitting his head and dropping the battery on his left hand. Patient with laceration and noted deformity to his left 5th finger. Patient states numbness to the tip of his left 5th digit. He denies LOC. He is alert and oriented. He is answering questions appropriately. He is in no acute distress. He has a noted abrasion to his forehead. The history is provided by the patient. Past Medical History:  
Diagnosis Date  Anemia   
 hgb stays around 9  
 Anticoagulated on Coumadin 4/22/2015  Borderline type 2 diabetes mellitus   
 glipizide daily- does not take BS at home--last A1C unsure results-- denies hypoglycemia  BPH (benign prostatic hyperplasia) 7/1/2016  BPH with obstruction/lower urinary tract symptoms 7/29/2010  Carotid stenosis   
 on right side followed by Dr Gucci Desouza q 6 months, no surgery at this point  Chest discomfort, tightness, pressure  10/8/2015  Chronic diastolic (congestive) heart failure (HCC) Echo 8/18/15 LVEF 50-55%   Dr Samra Almonte  Chronic diastolic heart failure ([address] 10/8/2015  Deep vein thrombosis (DVT) ([address] 10/8/2015  Diabetes mellitus type II, controlled ([address] 4/22/2015 Followed by PCP statin intolerant  Diabetes mellitus type II, uncontrolled ([address]   
 patient does not check blood glucose and has no symptoms  DVT (deep venous thrombosis) ([address] 4/23/2015 3/17/15 Extensive DVT in the right lower extremity  Elevated prostate specific antigen (PSA) 5/14/2010  Essential hypertension 8/2/2016  Ex-smoker for more than 1 year QUIT 1968--- 3 pk/day x 30 yrs  GERD (gastroesophageal reflux disease) prevacid daily- well controlled  HTN (hypertension) 4/22/2015  Hyperlipidemia 10/8/2015  Hypoxemia 3/7/2015  Lumbar stenosis with neurogenic claudication 12/10/2014  Prostate cancer [facility]) 2010  
 radiation - last tx 12/2010  Pulmonary hypertension ([facility] 2015  
 4/22/15  ECHO:  Peak pressure 63mmHg  Spinal abscess ([facility] 2015  
 hospitalized at [facility])   
 hemorrhagic stroke 17  Wound infection 2015 Past Surgical History:  
Procedure Laterality Date  BIOPSY PROSTATE    
 HX APPENDECTOMY  age 17  
 HX CATARACT REMOVAL Bilateral   
 HX CERVICAL FUSION    HX HERNIA REPAIR Right   
 inguinal hernia  HX TONSILLECTOMY  age 17  
 HX UROLOGICAL    
 prostate biopsy Family History:  
Problem Relation Age of Onset  Cancer Mother  Cancer Father Social History Socioeconomic History  Marital status:  Spouse name: Not on file  Number of children: Not on file  Years of education: Not on file  Highest education level: Not on file Social Needs  Financial resource strain: Not on file  Food insecurity - worry: Not on file  Food insecurity - inability: Not on file  Transportation needs - medical: Not on file  Transportation needs - non-medical: Not on file Occupational History  Not on file Tobacco Use  Smoking status: Former Smoker Packs/day: 3.00 Years: 30.00 Pack years: 90.00 Types: Cigarettes Last attempt to quit: 1968 Years since quittin.0  Smokeless tobacco: Never Used Substance and Sexual Activity  Alcohol use: No  
  Alcohol/week: 0.0 oz  Drug use: No  
 Sexual activity: Not on file Other Topics Concern  Not on file Social History Narrative  and lives with wife. ALLERGIES: Simvastatin and Statins-hmg-coa reductase inhibitors Review of Systems Musculoskeletal: Positive for arthralgias. Negative for neck pain. Skin: Positive for wound. Neurological: Positive for numbness. Negative for syncope. Vitals:  
 18 1249 BP: 156/76 Pulse: 93 Resp: 16 Temp: 98.7 °F (37.1 °C) SpO2: 96% Weight: 96.2 kg (212 lb) Height: 6' (1.829 m) Physical Exam  
Constitutional: He is oriented to person, place, and time. He appears well-developed and well-nourished. No distress. HENT:  
Head: Head is with abrasion. Eyes: Lids are normal. Lids are everted and swept, no foreign bodies found. Right eye exhibits no nystagmus. Left eye exhibits no nystagmus. Right pupil is round. Left pupil is round. Pupils are equal.  
Cardiovascular: Normal rate and regular rhythm. Pulmonary/Chest: Effort normal and breath sounds normal. No respiratory distress. Musculoskeletal:  
     Left hand: He exhibits decreased range of motion, bony tenderness and laceration. Decreased sensation (5th digit. ) noted. Hands: 
Neurological: He is alert and oriented to person, place, and time. GCS eye subscore is 4. GCS verbal subscore is 5. GCS motor subscore is 6. Skin: Skin is warm and dry. Abrasion noted. Nursing note and vitals reviewed. Xr Hand Lt Min 3 V Result Date: 11/25/2018 LEFT HAND RADIOGRAPHS, 11/25/2018 CLINICAL HISTORY:  Mild pain in entire left hand and severe pain in distal phalanx of left fifth digit after fall today. Laceration to distal left finger. TECHNIQUE:  AP, lateral and oblique views of the left hand. FINDINGS: Three views of the left hand are submitted for evaluation. There is a displaced fracture through the midportion of the distal phalanx of the fifth digit. There is distraction of the fracture fragments with additional mild lateral displacement of the major distal fracture fragment. No acute fracture is otherwise seen. Alignments are maintained without dislocation. Moderate to advanced osteoarthritic appearing changes are seen of the digits most prominently involving the distal interphalangeal joint of the third digit. IMPRESSION: 1. Fracture of the distal phalanx of the fifth digit as described above. Ct Head Wo Cont Result Date: 11/25/2018 CT HEAD WITHOUT CONTRAST, 11/25/2018 History: Fell with forehead abrasions. Comparison: None Technique:   5 mm axial scans from the skull base to the vertex. All CT scans performed at this facility use one or all of the following: Automated exposure control, adjustment of the mA and/or kVp according to patient's size, iterative reconstruction. Findings:  No evidence of intracranial hemorrhage is seen. No abnormal extra-axial fluid collections are seen. Moderate cortical involutional changes are seen which are not felt to be abnormal given the patient's age. No evidence for acute hydrocephalus is seen. No evidence of midline shift or herniation is seen. No abnormal edema pattern is seen in a vascular distribution to suggest large artery infarction. Only patchy periventricular ill-defined white matter hypoattenuation is seen which is favored to be chronic most likely representing moderate chronic microangiopathic changes. A small soft tissue defect suggesting a laceration is seen in the left forehead soft tissues on image 25. No associated radiopaque soft tissue foreign body is seen. Evaluation with bone windows shows no acute osseous changes. The visualized sinuses, middle ears, and mastoid air cells are well aerated. IMPRESSION:  1. No acute intracranial process evident by noncontrast CT study of the head. MDM Number of Diagnoses or Management Options Abrasion of forehead, initial encounter:  
Closed nondisplaced fracture of distal phalanx of left little finger, initial encounter:  
Fall, initial encounter:  
Diagnosis management comments: Wound repaired, dressing and splint applied to left 5th digit. CT head negative for intracranial bleeding. Tetanus shot given. Prescription for keflex and patient referred to orthopedics. Patient refused pain medication while in the department. Amount and/or Complexity of Data Reviewed Tests in the radiology section of CPT®: ordered and reviewed Patient Progress Patient progress: stable Wound Repair 
Date/Time: 11/25/2018 4:28 PM 
Performed by: NPSupervising provider: Chris Ly Preparation: skin prepped with Betadine Pre-procedure re-eval: Immediately prior to the procedure, the patient was reevaluated and found suitable for the planned procedure and any planned medications. Time out: Immediately prior to the procedure a time out was called to verify the correct patient, procedure, equipment, staff and marking as appropriate. [facility] Location details: left small finger Wound length:2.6 - 7.5 cm Anesthesia: digital block Anesthesia: 
Local Anesthetic: lidocaine 1% without epinephrine Anesthetic total: 1 mL Foreign bodies: no foreign bodies Irrigation solution: saline Irrigation method: syringe Debridement: none Skin closure: 4-0 nylon Subcutaneous closure: Vicryl Number of sutures: 11 Technique: simple Approximation: close Dressing: antibiotic ointment, 4x4 and splint Patient tolerance: Patient tolerated the procedure well with no immediate complications My total time at bedside, performing this procedure was 16-30 minutes. Comments: 1 vicryl suture to nail bed. 10 skin sutures.

## 2019-04-03 ENCOUNTER — APPOINTMENT (RX ONLY)
Dept: URBAN - METROPOLITAN AREA CLINIC 349 | Facility: CLINIC | Age: 85
Setting detail: DERMATOLOGY
End: 2019-04-03

## 2019-04-03 DIAGNOSIS — L57.0 ACTINIC KERATOSIS: ICD-10-CM

## 2019-04-03 DIAGNOSIS — L82.1 OTHER SEBORRHEIC KERATOSIS: ICD-10-CM

## 2019-04-03 DIAGNOSIS — Z71.89 OTHER SPECIFIED COUNSELING: ICD-10-CM

## 2019-04-03 PROBLEM — J30.1 ALLERGIC RHINITIS DUE TO POLLEN: Status: ACTIVE | Noted: 2019-04-03

## 2019-04-03 PROCEDURE — 17000 DESTRUCT PREMALG LESION: CPT

## 2019-04-03 PROCEDURE — ? LIQUID NITROGEN

## 2019-04-03 PROCEDURE — 99213 OFFICE O/P EST LOW 20 MIN: CPT | Mod: 25

## 2019-04-03 PROCEDURE — ? COUNSELING

## 2019-04-03 PROCEDURE — 17003 DESTRUCT PREMALG LES 2-14: CPT

## 2019-04-03 ASSESSMENT — LOCATION DETAILED DESCRIPTION DERM
LOCATION DETAILED: LEFT RADIAL DORSAL HAND
LOCATION DETAILED: RIGHT SUPERIOR HELIX
LOCATION DETAILED: LEFT PROXIMAL RADIAL DORSAL FOREARM
LOCATION DETAILED: LEFT CENTRAL PARIETAL SCALP
LOCATION DETAILED: LEFT SUPERIOR FOREHEAD
LOCATION DETAILED: LEFT SUPERIOR PARIETAL SCALP
LOCATION DETAILED: RIGHT MEDIAL UPPER BACK
LOCATION DETAILED: RIGHT SUPERIOR MEDIAL FOREHEAD
LOCATION DETAILED: LEFT SUPERIOR MEDIAL FOREHEAD
LOCATION DETAILED: LEFT LATERAL ANTECUBITAL SKIN
LOCATION DETAILED: LEFT CENTRAL FRONTAL SCALP
LOCATION DETAILED: RIGHT SUPERIOR UPPER BACK
LOCATION DETAILED: LEFT DISTAL RADIAL DORSAL FOREARM
LOCATION DETAILED: RIGHT SUPERIOR PARIETAL SCALP
LOCATION DETAILED: RIGHT RADIAL DORSAL HAND
LOCATION DETAILED: RIGHT MEDIAL FRONTAL SCALP
LOCATION DETAILED: LEFT SUPERIOR HELIX

## 2019-04-03 ASSESSMENT — LOCATION SIMPLE DESCRIPTION DERM
LOCATION SIMPLE: LEFT FOREARM
LOCATION SIMPLE: RIGHT FOREHEAD
LOCATION SIMPLE: LEFT HAND
LOCATION SIMPLE: LEFT ELBOW
LOCATION SIMPLE: RIGHT UPPER BACK
LOCATION SIMPLE: RIGHT EAR
LOCATION SIMPLE: LEFT SCALP
LOCATION SIMPLE: RIGHT HAND
LOCATION SIMPLE: RIGHT SCALP
LOCATION SIMPLE: LEFT EAR
LOCATION SIMPLE: SCALP
LOCATION SIMPLE: LEFT FOREHEAD

## 2019-04-03 ASSESSMENT — LOCATION ZONE DERM
LOCATION ZONE: ARM
LOCATION ZONE: TRUNK
LOCATION ZONE: SCALP
LOCATION ZONE: EAR
LOCATION ZONE: FACE
LOCATION ZONE: HAND

## 2019-04-03 NOTE — PROCEDURE: LIQUID NITROGEN
Detail Level: Generalized
Number Of Freeze-Thaw Cycles: 1 freeze-thaw cycle
Render Post-Care Instructions In Note?: no
Consent: The patient's consent was obtained including but not limited to risks of crusting, scabbing, blistering, scarring, darker or lighter pigmentary change, recurrence, incomplete removal and infection.
Duration Of Freeze Thaw-Cycle (Seconds): 3
Post-Care Instructions: I reviewed with the patient in detail post-care instructions. Patient is to wear sunprotection, and avoid picking at any of the treated lesions. Pt may apply Vaseline to crusted or scabbing areas.

## 2022-03-19 PROBLEM — I11.0 HYPERTENSIVE HEART DISEASE WITH CHF (HCC): Status: ACTIVE | Noted: 2017-08-17

## 2023-07-05 ENCOUNTER — APPOINTMENT (RX ONLY)
Dept: URBAN - METROPOLITAN AREA CLINIC 329 | Facility: CLINIC | Age: 89
Setting detail: DERMATOLOGY
End: 2023-07-05

## 2023-07-05 DIAGNOSIS — L57.0 ACTINIC KERATOSIS: ICD-10-CM

## 2023-07-05 DIAGNOSIS — D18.0 HEMANGIOMA: ICD-10-CM

## 2023-07-05 DIAGNOSIS — L57.8 OTHER SKIN CHANGES DUE TO CHRONIC EXPOSURE TO NONIONIZING RADIATION: ICD-10-CM

## 2023-07-05 DIAGNOSIS — L81.4 OTHER MELANIN HYPERPIGMENTATION: ICD-10-CM

## 2023-07-05 DIAGNOSIS — D22 MELANOCYTIC NEVI: ICD-10-CM

## 2023-07-05 DIAGNOSIS — L82.1 OTHER SEBORRHEIC KERATOSIS: ICD-10-CM

## 2023-07-05 PROBLEM — D22.5 MELANOCYTIC NEVI OF TRUNK: Status: ACTIVE | Noted: 2023-07-05

## 2023-07-05 PROBLEM — D18.01 HEMANGIOMA OF SKIN AND SUBCUTANEOUS TISSUE: Status: ACTIVE | Noted: 2023-07-05

## 2023-07-05 PROCEDURE — 99203 OFFICE O/P NEW LOW 30 MIN: CPT | Mod: 25

## 2023-07-05 PROCEDURE — ? COUNSELING

## 2023-07-05 PROCEDURE — ? FULL BODY SKIN EXAM

## 2023-07-05 PROCEDURE — ? LIQUID NITROGEN

## 2023-07-05 PROCEDURE — ? TREATMENT REGIMEN

## 2023-07-05 PROCEDURE — 17004 DESTROY PREMAL LESIONS 15/>: CPT

## 2023-07-05 PROCEDURE — ? DERMATOSCOPIC EVALUATION

## 2023-07-05 ASSESSMENT — LOCATION SIMPLE DESCRIPTION DERM
LOCATION SIMPLE: LEFT UPPER ARM
LOCATION SIMPLE: LEFT FOREARM
LOCATION SIMPLE: CHEST
LOCATION SIMPLE: LEFT ELBOW
LOCATION SIMPLE: RIGHT UPPER ARM
LOCATION SIMPLE: LEFT FOREHEAD
LOCATION SIMPLE: SCALP
LOCATION SIMPLE: ABDOMEN
LOCATION SIMPLE: LEFT OCCIPITAL SCALP
LOCATION SIMPLE: RIGHT HAND
LOCATION SIMPLE: LEFT SCALP
LOCATION SIMPLE: ANTERIOR SCALP
LOCATION SIMPLE: RIGHT TEMPLE
LOCATION SIMPLE: RIGHT EAR
LOCATION SIMPLE: RIGHT OCCIPITAL SCALP
LOCATION SIMPLE: LEFT UPPER BACK
LOCATION SIMPLE: LEFT CHEEK
LOCATION SIMPLE: RIGHT FOREHEAD
LOCATION SIMPLE: UPPER BACK
LOCATION SIMPLE: SUPERIOR FOREHEAD
LOCATION SIMPLE: RIGHT FOREARM

## 2023-07-05 ASSESSMENT — LOCATION ZONE DERM
LOCATION ZONE: FACE
LOCATION ZONE: HAND
LOCATION ZONE: ARM
LOCATION ZONE: SCALP
LOCATION ZONE: TRUNK
LOCATION ZONE: EAR

## 2023-07-05 ASSESSMENT — LOCATION DETAILED DESCRIPTION DERM
LOCATION DETAILED: LEFT DISTAL DORSAL FOREARM
LOCATION DETAILED: RIGHT CENTRAL PARIETAL SCALP
LOCATION DETAILED: RIGHT DISTAL DORSAL FOREARM
LOCATION DETAILED: RIGHT SUPERIOR OCCIPITAL SCALP
LOCATION DETAILED: LEFT SUPERIOR OCCIPITAL SCALP
LOCATION DETAILED: LEFT ANTERIOR DISTAL UPPER ARM
LOCATION DETAILED: MID-FRONTAL SCALP
LOCATION DETAILED: LEFT MEDIAL FRONTAL SCALP
LOCATION DETAILED: LEFT LATERAL ANTECUBITAL SKIN
LOCATION DETAILED: 2ND WEB SPACE RIGHT HAND
LOCATION DETAILED: RIGHT PROXIMAL RADIAL DORSAL FOREARM
LOCATION DETAILED: LEFT SUPERIOR FOREHEAD
LOCATION DETAILED: SUPERIOR THORACIC SPINE
LOCATION DETAILED: LEFT SUPERIOR MEDIAL FOREHEAD
LOCATION DETAILED: LEFT MEDIAL FOREHEAD
LOCATION DETAILED: LEFT INFERIOR UPPER BACK
LOCATION DETAILED: LEFT PROXIMAL DORSAL FOREARM
LOCATION DETAILED: RIGHT ANTERIOR DISTAL UPPER ARM
LOCATION DETAILED: SUPERIOR MID FOREHEAD
LOCATION DETAILED: LEFT SUPERIOR PARIETAL SCALP
LOCATION DETAILED: RIGHT MID TEMPLE
LOCATION DETAILED: RIGHT SUPERIOR FOREHEAD
LOCATION DETAILED: EPIGASTRIC SKIN
LOCATION DETAILED: RIGHT INFERIOR LATERAL FOREHEAD
LOCATION DETAILED: RIGHT FOREHEAD
LOCATION DETAILED: RIGHT SUPERIOR POSTERIOR HELIX
LOCATION DETAILED: RIGHT LATERAL FOREHEAD
LOCATION DETAILED: UPPER STERNUM
LOCATION DETAILED: LEFT CENTRAL MALAR CHEEK
LOCATION DETAILED: RIGHT PROXIMAL DORSAL FOREARM

## 2023-07-05 NOTE — PROCEDURE: LIQUID NITROGEN
Render Post-Care Instructions In Note?: no
Consent: The patient's consent was obtained including but not limited to risks of crusting, scabbing, blistering, scarring, darker or lighter pigmentary change, recurrence, incomplete removal and infection.
Detail Level: Detailed
Show Aperture Variable?: Yes
Post-Care Instructions: I reviewed with the patient in detail post-care instructions. Patient is to wear sunprotection, and avoid picking at any of the treated lesions. Pt may apply Vaseline to crusted or scabbing areas.
Duration Of Freeze Thaw-Cycle (Seconds): 0

## 2025-01-21 ENCOUNTER — APPOINTMENT (OUTPATIENT)
Dept: NON INVASIVE DIAGNOSTICS | Age: 89
DRG: 091 | End: 2025-01-21
Attending: STUDENT IN AN ORGANIZED HEALTH CARE EDUCATION/TRAINING PROGRAM
Payer: MEDICARE

## 2025-01-21 ENCOUNTER — APPOINTMENT (OUTPATIENT)
Dept: CT IMAGING | Age: 89
DRG: 091 | End: 2025-01-21
Payer: MEDICARE

## 2025-01-21 ENCOUNTER — HOSPITAL ENCOUNTER (INPATIENT)
Age: 89
LOS: 7 days | Discharge: NURSING, REHAB OR LONG-TERM CARE | DRG: 091 | End: 2025-01-28
Attending: EMERGENCY MEDICINE
Payer: MEDICARE

## 2025-01-21 DIAGNOSIS — R29.90 STROKE-LIKE SYMPTOMS: ICD-10-CM

## 2025-01-21 DIAGNOSIS — R47.9 SPEECH DISTURBANCE, UNSPECIFIED TYPE: Primary | ICD-10-CM

## 2025-01-21 DIAGNOSIS — R47.1 DYSARTHRIA: ICD-10-CM

## 2025-01-21 PROBLEM — R47.01 APHASIA: Status: ACTIVE | Noted: 2025-01-21

## 2025-01-21 LAB
ALBUMIN SERPL-MCNC: 3.7 G/DL (ref 3.2–4.6)
ALBUMIN/GLOB SERPL: 1.2 (ref 1–1.9)
ALP SERPL-CCNC: 83 U/L (ref 40–129)
ALT SERPL-CCNC: 7 U/L (ref 8–55)
ANION GAP SERPL CALC-SCNC: 12 MMOL/L (ref 7–16)
APPEARANCE UR: CLEAR
AST SERPL-CCNC: 17 U/L (ref 15–37)
BACTERIA URNS QL MICRO: NEGATIVE /HPF
BASOPHILS # BLD: 0.03 K/UL (ref 0–0.2)
BASOPHILS NFR BLD: 0.6 % (ref 0–2)
BILIRUB SERPL-MCNC: 0.9 MG/DL (ref 0–1.2)
BILIRUB UR QL: NEGATIVE
BUN SERPL-MCNC: 28 MG/DL (ref 8–23)
CALCIUM SERPL-MCNC: 10 MG/DL (ref 8.8–10.2)
CHLORIDE SERPL-SCNC: 100 MMOL/L (ref 98–107)
CHOLEST SERPL-MCNC: 165 MG/DL (ref 0–200)
CO2 SERPL-SCNC: 27 MMOL/L (ref 20–29)
COLOR UR: ABNORMAL
CREAT SERPL-MCNC: 1.45 MG/DL (ref 0.8–1.3)
DIFFERENTIAL METHOD BLD: ABNORMAL
ECHO AO ROOT DIAM: 3.1 CM
ECHO AV AREA PEAK VELOCITY: 2.1 CM2
ECHO AV AREA VTI: 2.2 CM2
ECHO AV MEAN GRADIENT: 3 MMHG
ECHO AV MEAN VELOCITY: 0.9 M/S
ECHO AV PEAK GRADIENT: 5 MMHG
ECHO AV PEAK VELOCITY: 1.2 M/S
ECHO AV VELOCITY RATIO: 0.67
ECHO AV VTI: 19.9 CM
ECHO EST RA PRESSURE: 3 MMHG
ECHO LA AREA 2C: 15.8 CM2
ECHO LA AREA 4C: 14.8 CM2
ECHO LA MAJOR AXIS: 4.8 CM
ECHO LA MINOR AXIS: 5 CM
ECHO LA VOL BP: 40 ML (ref 18–58)
ECHO LA VOL MOD A2C: 41 ML (ref 18–58)
ECHO LA VOL MOD A4C: 38 ML (ref 18–58)
ECHO LV E' LATERAL VELOCITY: 13.6 CM/S
ECHO LV E' SEPTAL VELOCITY: 3.79 CM/S
ECHO LV EF PHYSICIAN: 50 %
ECHO LV FRACTIONAL SHORTENING: 22 % (ref 28–44)
ECHO LV INTERNAL DIMENSION DIASTOLIC: 5.1 CM (ref 4.2–5.9)
ECHO LV INTERNAL DIMENSION SYSTOLIC: 4 CM
ECHO LV IVSD: 1.2 CM (ref 0.6–1)
ECHO LV MASS 2D: 241.2 G (ref 88–224)
ECHO LV POSTERIOR WALL DIASTOLIC: 1.2 CM (ref 0.6–1)
ECHO LV RELATIVE WALL THICKNESS RATIO: 0.47
ECHO LVOT AREA: 3.1 CM2
ECHO LVOT AV VTI INDEX: 0.7
ECHO LVOT DIAM: 2 CM
ECHO LVOT MEAN GRADIENT: 1 MMHG
ECHO LVOT PEAK GRADIENT: 2 MMHG
ECHO LVOT PEAK VELOCITY: 0.8 M/S
ECHO LVOT SV: 43.6 ML
ECHO LVOT VTI: 13.9 CM
ECHO MV A VELOCITY: 0.4 M/S
ECHO MV E DECELERATION TIME (DT): 182 MS
ECHO MV E VELOCITY: 1.03 M/S
ECHO MV E/A RATIO: 2.58
ECHO MV E/E' LATERAL: 7.57
ECHO MV E/E' RATIO (AVERAGED): 17.38
ECHO MV E/E' SEPTAL: 27.18
ECHO PV AREA CONTINUITY EQ VELOCITY: 2.7 CM2
ECHO PV MAX VELOCITY: 0.7 M/S
ECHO PV PEAK GRADIENT: 2 MMHG
ECHO QP:QS RATIO: 0.81 NO UNITS
ECHO RIGHT VENTRICULAR SYSTOLIC PRESSURE (RVSP): 27 MMHG
ECHO RV TAPSE: 1.5 CM (ref 1.7–?)
ECHO RVOT AREA: 2.8 CM2
ECHO RVOT DIAMETER: 1.9 CM
ECHO RVOT MEAN GRADIENT: 1 MMHG
ECHO RVOT PEAK GRADIENT: 2 MMHG
ECHO RVOT PEAK VELOCITY: 0.7 M/S
ECHO RVOT STROKE VOLUME: 35.4 ML
ECHO RVOT VTI: 12.5 CM
ECHO TV REGURGITANT MAX VELOCITY: 2.44 M/S
ECHO TV REGURGITANT PEAK GRADIENT: 24 MMHG
EOSINOPHIL # BLD: 0.11 K/UL (ref 0–0.8)
EOSINOPHIL NFR BLD: 2 % (ref 0.5–7.8)
EPI CELLS #/AREA URNS HPF: ABNORMAL /HPF
ERYTHROCYTE [DISTWIDTH] IN BLOOD BY AUTOMATED COUNT: 13.4 % (ref 11.9–14.6)
EST. AVERAGE GLUCOSE BLD GHB EST-MCNC: 123 MG/DL
GLOBULIN SER CALC-MCNC: 3.2 G/DL (ref 2.3–3.5)
GLUCOSE BLD STRIP.AUTO-MCNC: 115 MG/DL (ref 65–100)
GLUCOSE SERPL-MCNC: 116 MG/DL (ref 70–99)
GLUCOSE UR STRIP.AUTO-MCNC: NEGATIVE MG/DL
HBA1C MFR BLD: 5.9 % (ref 0–5.6)
HCT VFR BLD AUTO: 43.6 % (ref 41.1–50.3)
HDLC SERPL-MCNC: 31 MG/DL (ref 40–60)
HDLC SERPL: 5.3 (ref 0–5)
HGB BLD-MCNC: 14.2 G/DL (ref 13.6–17.2)
HGB UR QL STRIP: ABNORMAL
HYALINE CASTS URNS QL MICRO: ABNORMAL /LPF
IMM GRANULOCYTES # BLD AUTO: 0.01 K/UL (ref 0–0.5)
IMM GRANULOCYTES NFR BLD AUTO: 0.2 % (ref 0–5)
INR PPP: 2.6
KETONES UR QL STRIP.AUTO: NEGATIVE MG/DL
LDLC SERPL CALC-MCNC: 91 MG/DL (ref 0–100)
LEUKOCYTE ESTERASE UR QL STRIP.AUTO: NEGATIVE
LYMPHOCYTES # BLD: 1.24 K/UL (ref 0.5–4.6)
LYMPHOCYTES NFR BLD: 23 % (ref 13–44)
MCH RBC QN AUTO: 27.9 PG (ref 26.1–32.9)
MCHC RBC AUTO-ENTMCNC: 32.6 G/DL (ref 31.4–35)
MCV RBC AUTO: 85.7 FL (ref 82–102)
MONOCYTES # BLD: 0.55 K/UL (ref 0.1–1.3)
MONOCYTES NFR BLD: 10.2 % (ref 4–12)
NEUTS SEG # BLD: 3.46 K/UL (ref 1.7–8.2)
NEUTS SEG NFR BLD: 64 % (ref 43–78)
NITRITE UR QL STRIP.AUTO: NEGATIVE
NRBC # BLD: 0 K/UL (ref 0–0.2)
PH UR STRIP: 7 (ref 5–9)
PLATELET # BLD AUTO: 191 K/UL (ref 150–450)
PMV BLD AUTO: 9 FL (ref 9.4–12.3)
POTASSIUM SERPL-SCNC: 3.7 MMOL/L (ref 3.5–5.1)
PROT SERPL-MCNC: 6.9 G/DL (ref 6.3–8.2)
PROT UR STRIP-MCNC: NEGATIVE MG/DL
PROTHROMBIN TIME: 28 SEC (ref 11.3–14.9)
RBC # BLD AUTO: 5.09 M/UL (ref 4.23–5.6)
RBC #/AREA URNS HPF: ABNORMAL /HPF
SARS-COV-2 RDRP RESP QL NAA+PROBE: NOT DETECTED
SERVICE CMNT-IMP: ABNORMAL
SODIUM SERPL-SCNC: 139 MMOL/L (ref 136–145)
SOURCE: NORMAL
SP GR UR REFRACTOMETRY: 1.03 (ref 1–1.02)
TRIGL SERPL-MCNC: 214 MG/DL (ref 0–150)
UROBILINOGEN UR QL STRIP.AUTO: 0.2 EU/DL (ref 0.2–1)
VLDLC SERPL CALC-MCNC: 43 MG/DL (ref 6–23)
WBC # BLD AUTO: 5.4 K/UL (ref 4.3–11.1)
WBC URNS QL MICRO: ABNORMAL /HPF

## 2025-01-21 PROCEDURE — 80061 LIPID PANEL: CPT

## 2025-01-21 PROCEDURE — 70496 CT ANGIOGRAPHY HEAD: CPT

## 2025-01-21 PROCEDURE — 70450 CT HEAD/BRAIN W/O DYE: CPT

## 2025-01-21 PROCEDURE — 70496 CT ANGIOGRAPHY HEAD: CPT | Performed by: RADIOLOGY

## 2025-01-21 PROCEDURE — 83036 HEMOGLOBIN GLYCOSYLATED A1C: CPT

## 2025-01-21 PROCEDURE — 99221 1ST HOSP IP/OBS SF/LOW 40: CPT | Performed by: STUDENT IN AN ORGANIZED HEALTH CARE EDUCATION/TRAINING PROGRAM

## 2025-01-21 PROCEDURE — 6370000000 HC RX 637 (ALT 250 FOR IP): Performed by: STUDENT IN AN ORGANIZED HEALTH CARE EDUCATION/TRAINING PROGRAM

## 2025-01-21 PROCEDURE — 2500000003 HC RX 250 WO HCPCS

## 2025-01-21 PROCEDURE — 93306 TTE W/DOPPLER COMPLETE: CPT

## 2025-01-21 PROCEDURE — 6360000004 HC RX CONTRAST MEDICATION: Performed by: EMERGENCY MEDICINE

## 2025-01-21 PROCEDURE — 82962 GLUCOSE BLOOD TEST: CPT

## 2025-01-21 PROCEDURE — 93306 TTE W/DOPPLER COMPLETE: CPT | Performed by: INTERNAL MEDICINE

## 2025-01-21 PROCEDURE — 1100000000 HC RM PRIVATE

## 2025-01-21 PROCEDURE — 1100000003 HC PRIVATE W/ TELEMETRY

## 2025-01-21 PROCEDURE — 80053 COMPREHEN METABOLIC PANEL: CPT

## 2025-01-21 PROCEDURE — 81001 URINALYSIS AUTO W/SCOPE: CPT

## 2025-01-21 PROCEDURE — 85025 COMPLETE CBC W/AUTO DIFF WBC: CPT

## 2025-01-21 PROCEDURE — 70498 CT ANGIOGRAPHY NECK: CPT | Performed by: RADIOLOGY

## 2025-01-21 PROCEDURE — 99285 EMERGENCY DEPT VISIT HI MDM: CPT

## 2025-01-21 PROCEDURE — 87635 SARS-COV-2 COVID-19 AMP PRB: CPT

## 2025-01-21 PROCEDURE — 85610 PROTHROMBIN TIME: CPT

## 2025-01-21 RX ORDER — ONDANSETRON 4 MG/1
4 TABLET, ORALLY DISINTEGRATING ORAL EVERY 8 HOURS PRN
Status: DISCONTINUED | OUTPATIENT
Start: 2025-01-21 | End: 2025-01-28 | Stop reason: HOSPADM

## 2025-01-21 RX ORDER — CARVEDILOL 25 MG/1
25 TABLET ORAL 2 TIMES DAILY WITH MEALS
Status: ON HOLD | COMMUNITY

## 2025-01-21 RX ORDER — LOSARTAN POTASSIUM 100 MG/1
100 TABLET ORAL DAILY
Status: ON HOLD | COMMUNITY

## 2025-01-21 RX ORDER — SODIUM CHLORIDE 0.9 % (FLUSH) 0.9 %
5-40 SYRINGE (ML) INJECTION PRN
Status: DISCONTINUED | OUTPATIENT
Start: 2025-01-21 | End: 2025-01-28 | Stop reason: HOSPADM

## 2025-01-21 RX ORDER — POTASSIUM CHLORIDE 1500 MG/1
40 TABLET, EXTENDED RELEASE ORAL PRN
Status: DISCONTINUED | OUTPATIENT
Start: 2025-01-21 | End: 2025-01-28 | Stop reason: HOSPADM

## 2025-01-21 RX ORDER — IOPAMIDOL 755 MG/ML
60 INJECTION, SOLUTION INTRAVASCULAR
Status: COMPLETED | OUTPATIENT
Start: 2025-01-21 | End: 2025-01-21

## 2025-01-21 RX ORDER — MAGNESIUM SULFATE IN WATER 40 MG/ML
2000 INJECTION, SOLUTION INTRAVENOUS PRN
Status: DISCONTINUED | OUTPATIENT
Start: 2025-01-21 | End: 2025-01-28 | Stop reason: HOSPADM

## 2025-01-21 RX ORDER — GLIPIZIDE 5 MG/1
5 TABLET ORAL
Status: ON HOLD | COMMUNITY

## 2025-01-21 RX ORDER — EZETIMIBE 10 MG/1
10 TABLET ORAL DAILY
Status: ON HOLD | COMMUNITY

## 2025-01-21 RX ORDER — ACETAMINOPHEN 650 MG/1
650 SUPPOSITORY RECTAL EVERY 6 HOURS PRN
Status: DISCONTINUED | OUTPATIENT
Start: 2025-01-21 | End: 2025-01-28 | Stop reason: HOSPADM

## 2025-01-21 RX ORDER — SODIUM CHLORIDE 9 MG/ML
INJECTION, SOLUTION INTRAVENOUS PRN
Status: DISCONTINUED | OUTPATIENT
Start: 2025-01-21 | End: 2025-01-28 | Stop reason: HOSPADM

## 2025-01-21 RX ORDER — ALLOPURINOL 100 MG/1
100 TABLET ORAL DAILY
Status: ON HOLD | COMMUNITY

## 2025-01-21 RX ORDER — POTASSIUM CHLORIDE 7.45 MG/ML
10 INJECTION INTRAVENOUS PRN
Status: DISCONTINUED | OUTPATIENT
Start: 2025-01-21 | End: 2025-01-28 | Stop reason: HOSPADM

## 2025-01-21 RX ORDER — ACETAMINOPHEN 325 MG/1
650 TABLET ORAL EVERY 6 HOURS PRN
Status: DISCONTINUED | OUTPATIENT
Start: 2025-01-21 | End: 2025-01-28 | Stop reason: HOSPADM

## 2025-01-21 RX ORDER — EZETIMIBE 10 MG/1
10 TABLET ORAL NIGHTLY
Status: DISCONTINUED | OUTPATIENT
Start: 2025-01-21 | End: 2025-01-22

## 2025-01-21 RX ORDER — POLYETHYLENE GLYCOL 3350 17 G/17G
17 POWDER, FOR SOLUTION ORAL DAILY PRN
Status: DISCONTINUED | OUTPATIENT
Start: 2025-01-21 | End: 2025-01-28 | Stop reason: HOSPADM

## 2025-01-21 RX ORDER — ONDANSETRON 2 MG/ML
4 INJECTION INTRAMUSCULAR; INTRAVENOUS EVERY 6 HOURS PRN
Status: DISCONTINUED | OUTPATIENT
Start: 2025-01-21 | End: 2025-01-28 | Stop reason: HOSPADM

## 2025-01-21 RX ORDER — SODIUM CHLORIDE 0.9 % (FLUSH) 0.9 %
5-40 SYRINGE (ML) INJECTION EVERY 12 HOURS SCHEDULED
Status: DISCONTINUED | OUTPATIENT
Start: 2025-01-21 | End: 2025-01-28 | Stop reason: HOSPADM

## 2025-01-21 RX ORDER — FINASTERIDE 5 MG/1
5 TABLET, FILM COATED ORAL DAILY
Status: ON HOLD | COMMUNITY

## 2025-01-21 RX ADMIN — RIVAROXABAN 15 MG: 15 TABLET, FILM COATED ORAL at 18:50

## 2025-01-21 RX ADMIN — EZETIMIBE 10 MG: 10 TABLET ORAL at 20:32

## 2025-01-21 RX ADMIN — IOPAMIDOL 60 ML: 755 INJECTION, SOLUTION INTRAVENOUS at 13:07

## 2025-01-21 RX ADMIN — SODIUM CHLORIDE, PRESERVATIVE FREE 10 ML: 5 INJECTION INTRAVENOUS at 20:33

## 2025-01-21 NOTE — ED NOTES
Pt unable to give information due to aphasia.  MRI made aware.  Called spouse on file with no answer an left voicemail for callback.     Arabella Manriquez RN  01/21/25 [id number]

## 2025-01-21 NOTE — ED NOTES
MRI called to inform that family does not know enough info to answer MRI screening.     Arabella Manriquez, RN  01/21/25 [id number]

## 2025-01-21 NOTE — ED NOTES
Dr Anderson perfect served that MRI can be made routine.  MRI made aware.  Dr Anderson re-informed what ws required to carry out the MRI.       Arabella Manriquez RN  01/21/25 [id number]

## 2025-01-21 NOTE — H&P
carvedilol (COREG) 25 mg, Oral, 2 TIMES DAILY WITH MEALS    ezetimibe (ZETIA) 10 mg, Oral, DAILY    finasteride (PROSCAR) 5 mg, Oral, DAILY    glipiZIDE (GLUCOTROL) 5 mg, Oral, 2 TIMES DAILY BEFORE MEALS    losartan (COZAAR) 100 mg, Oral, DAILY    rivaroxaban (XARELTO) 15 mg, Oral       I have personally reviewed labs and tests:  Recent Results (from the past 24 hour(s))   CBC with Auto Differential    Collection Time: 01/21/25 12:55 PM   Result Value Ref Range    WBC 5.4 4.3 - 11.1 K/uL    RBC 5.09 4.23 - 5.6 M/uL    Hemoglobin 14.2 13.6 - 17.2 g/dL    Hematocrit 43.6 41.1 - 50.3 %    MCV 85.7 82 - 102 FL    MCH 27.9 26.1 - 32.9 PG    MCHC 32.6 31.4 - 35.0 g/dL    RDW 13.4 11.9 - 14.6 %    Platelets 191 150 - 450 K/uL    MPV 9.0 (L) 9.4 - 12.3 FL    nRBC 0.00 0.0 - 0.2 K/uL    Differential Type AUTOMATED      Neutrophils % 64.0 43.0 - 78.0 %    Lymphocytes % 23.0 13.0 - 44.0 %    Monocytes % 10.2 4.0 - 12.0 %    Eosinophils % 2.0 0.5 - 7.8 %    Basophils % 0.6 0.0 - 2.0 %    Immature Granulocytes % 0.2 0.0 - 5.0 %    Neutrophils Absolute 3.46 1.70 - 8.20 K/UL    Lymphocytes Absolute 1.24 0.50 - 4.60 K/UL    Monocytes Absolute 0.55 0.10 - 1.30 K/UL    Eosinophils Absolute 0.11 0.00 - 0.80 K/UL    Basophils Absolute 0.03 0.00 - 0.20 K/UL    Immature Granulocytes Absolute 0.01 0.0 - 0.5 K/UL   Comprehensive Metabolic Panel    Collection Time: 01/21/25 12:55 PM   Result Value Ref Range    Sodium 139 136 - 145 mmol/L    Potassium 3.7 3.5 - 5.1 mmol/L    Chloride 100 98 - 107 mmol/L    CO2 27 20 - 29 mmol/L    Anion Gap 12 7 - 16 mmol/L    Glucose 116 (H) 70 - 99 mg/dL    BUN 28 (H) 8 - 23 MG/DL    Creatinine 1.45 (H) 0.80 - 1.30 MG/DL    Est, Glom Filt Rate 46 (L) >60 ml/min/1.73m2    Calcium 10.0 8.8 - 10.2 MG/DL    Total Bilirubin 0.9 0.0 - 1.2 MG/DL    ALT 7 (L) 8 - 55 U/L    AST 17 15 - 37 U/L    Alk Phosphatase 83 40 - 129 U/L    Total Protein 6.9 6.3 - 8.2 g/dL    Albumin 3.7 3.2 - 4.6 g/dL    Globulin 3.2 2.3 -

## 2025-01-21 NOTE — ED NOTES
Dr Anderson stated to hold off on MRI for now.  Dr Anderson stated hospitalist can put in necessary imaging orders      Arabella Manriquez RN  01/21/25 [id number]

## 2025-01-21 NOTE — ED NOTES
Dr Anderson perfect served to inform him that patient and family are unable to provide enough information to fill out MRI screening form.  Dr Anderosn made aware he would have to order an abdominal xray, chest xray, and CT head that are read from radiologist confirming no metal present and then screening form needs to be sign by Dr Anderson.       Arabella Manriquez, RN  01/21/25 [id number]

## 2025-01-21 NOTE — ED PROVIDER NOTES
Emergency Department Provider Note       PCP: Basilio Lopez MD   Age: 89 y.o.   Sex: male     DISPOSITION Admitted 01/21/2025 03:14:38 PM    ICD-10-CM    1. Speech disturbance, unspecified type  R47.9 Echo (TTE) complete (PRN contrast/bubble/strain/3D)     Echo (TTE) complete (PRN contrast/bubble/strain/3D)      2. Dysarthria  R47.1       3. Stroke-like symptoms  R29.90           Medical Decision Making     89-year-old male with history of DVT on Xarelto presents to ED with complaint of speech difficulty.  Patient last known normal around 12 PM today.  Patient reportedly had trouble swallowing his food and was confused with dysarthria/aphasia..  EMS obtained from EMS.  Patient reportedly had a fall on yesterday.  NIHSS 6.  CT head with no acute intracranial abnormality noted.  CTA with no evidence of large vessel occlusion.  Bilateral proximal vertebral artery stenoses.  No significant proximal ICA stenosis.  Neurology (Dr. Anderson) evaluated and recommends permissive hypertension, resume Xarelto, continue Zetia.  Recommends admit for observation and completion of acute ischemic CVA workup including MRI and TTE.  Hospitalist consulted for admission via [facility].    ED Course as of 01/22/25 1805   Tue Jan 21, 2025   1338 CTA head and neck w/ w/o Ivcontrast IMPRESSION:  1. No evidence of large vessel occlusion.  2. Bilateral proximal vertebral artery stenoses.  3. No significant proximal ICA stenosis      [DF]   1504 CT head FINDINGS:  Mild scattered white matter low-density changes, nonspecific, but often related  to chronic small vessel vasculopathy.  Mild chronic cerebral atrophy.  Old encephalomalacia left frontal and left temporal lobes based on extensive low  density, focal volume loss.     No acute intracranial hemorrhage seen.  No intracranial mass seen.  No midline shift or ventricular obstruction seen.        IMPRESSION:  No acute intracranial abnormality identified.     Chronic findings.   [DF]

## 2025-01-21 NOTE — ED TRIAGE NOTES
Patient arrives to ED via EMS. Patient had slurred speech and difficulty understanding/speaking. This started at 12 noon today. Patient had a fall yesterday morning and hit his head. Patient is on xerleto.  History of Afib.    RACE 2  BP-148/92  BGL-161

## 2025-01-21 NOTE — FLOWSHEET NOTE
01/21/25 1836   NIH Stroke Scale   NIH Stroke Scale Assessed Yes   Interval Hand-off/Transfer   Level of Consciousness (1a) 1   LOC Questions (1b) 0   LOC Commands (1c) 0   Best Gaze (2) 0   Visual (3) 0   Facial Palsy (4) 0   Motor Arm, Left (5a) 0   Motor Arm, Right (5b) 0   Motor Leg, Left (6a) 0   Motor Leg, Right (6b) 0   Limb Ataxia (7) 0   Sensory (8) 0   Best Language (9) 1   Dysarthria (10) 0   Extinction and Inattention (11) 0   Total 2

## 2025-01-22 PROBLEM — E44.0 MODERATE PROTEIN-CALORIE MALNUTRITION (HCC): Status: ACTIVE | Noted: 2025-01-22

## 2025-01-22 PROBLEM — Z51.5 PALLIATIVE CARE ENCOUNTER: Status: ACTIVE | Noted: 2025-01-22

## 2025-01-22 LAB
AMMONIA PLAS-SCNC: 17 UMOL/L (ref 16–60)
ANION GAP SERPL CALC-SCNC: 15 MMOL/L (ref 7–16)
BUN SERPL-MCNC: 25 MG/DL (ref 8–23)
CALCIUM SERPL-MCNC: 9.7 MG/DL (ref 8.8–10.2)
CHLORIDE SERPL-SCNC: 100 MMOL/L (ref 98–107)
CO2 SERPL-SCNC: 24 MMOL/L (ref 20–29)
CREAT SERPL-MCNC: 1.36 MG/DL (ref 0.8–1.3)
ERYTHROCYTE [DISTWIDTH] IN BLOOD BY AUTOMATED COUNT: 13.3 % (ref 11.9–14.6)
EST. AVERAGE GLUCOSE BLD GHB EST-MCNC: 120 MG/DL
FOLATE SERPL-MCNC: 11.2 NG/ML (ref 3.1–17.5)
GLUCOSE BLD STRIP.AUTO-MCNC: 116 MG/DL (ref 65–100)
GLUCOSE BLD STRIP.AUTO-MCNC: 130 MG/DL (ref 65–100)
GLUCOSE SERPL-MCNC: 112 MG/DL (ref 70–99)
HBA1C MFR BLD: 5.8 % (ref 0–5.6)
HCT VFR BLD AUTO: 46.8 % (ref 41.1–50.3)
HGB BLD-MCNC: 15.2 G/DL (ref 13.6–17.2)
MCH RBC QN AUTO: 27.7 PG (ref 26.1–32.9)
MCHC RBC AUTO-ENTMCNC: 32.5 G/DL (ref 31.4–35)
MCV RBC AUTO: 85.4 FL (ref 82–102)
NRBC # BLD: 0 K/UL (ref 0–0.2)
PLATELET # BLD AUTO: 187 K/UL (ref 150–450)
PMV BLD AUTO: 9.5 FL (ref 9.4–12.3)
POTASSIUM SERPL-SCNC: 3.7 MMOL/L (ref 3.5–5.1)
RBC # BLD AUTO: 5.48 M/UL (ref 4.23–5.6)
SERVICE CMNT-IMP: ABNORMAL
SERVICE CMNT-IMP: ABNORMAL
SODIUM SERPL-SCNC: 139 MMOL/L (ref 136–145)
T4 SERPL-MCNC: 8.2 UG/DL (ref 4.5–11.7)
TSH, 3RD GENERATION: 0.9 UIU/ML (ref 0.27–4.2)
VIT B12 SERPL-MCNC: 340 PG/ML (ref 193–986)
WBC # BLD AUTO: 7.8 K/UL (ref 4.3–11.1)

## 2025-01-22 PROCEDURE — 84436 ASSAY OF TOTAL THYROXINE: CPT

## 2025-01-22 PROCEDURE — 82746 ASSAY OF FOLIC ACID SERUM: CPT

## 2025-01-22 PROCEDURE — 6370000000 HC RX 637 (ALT 250 FOR IP)

## 2025-01-22 PROCEDURE — 97162 PT EVAL MOD COMPLEX 30 MIN: CPT

## 2025-01-22 PROCEDURE — 93005 ELECTROCARDIOGRAM TRACING: CPT | Performed by: EMERGENCY MEDICINE

## 2025-01-22 PROCEDURE — 84443 ASSAY THYROID STIM HORMONE: CPT

## 2025-01-22 PROCEDURE — 82607 VITAMIN B-12: CPT

## 2025-01-22 PROCEDURE — 85027 COMPLETE CBC AUTOMATED: CPT

## 2025-01-22 PROCEDURE — 82140 ASSAY OF AMMONIA: CPT

## 2025-01-22 PROCEDURE — 1100000003 HC PRIVATE W/ TELEMETRY

## 2025-01-22 PROCEDURE — 36415 COLL VENOUS BLD VENIPUNCTURE: CPT

## 2025-01-22 PROCEDURE — 97535 SELF CARE MNGMENT TRAINING: CPT

## 2025-01-22 PROCEDURE — 99232 SBSQ HOSP IP/OBS MODERATE 35: CPT | Performed by: NURSE PRACTITIONER

## 2025-01-22 PROCEDURE — 80048 BASIC METABOLIC PNL TOTAL CA: CPT

## 2025-01-22 PROCEDURE — 92610 EVALUATE SWALLOWING FUNCTION: CPT

## 2025-01-22 PROCEDURE — 97530 THERAPEUTIC ACTIVITIES: CPT

## 2025-01-22 PROCEDURE — 82962 GLUCOSE BLOOD TEST: CPT

## 2025-01-22 PROCEDURE — 2400000000

## 2025-01-22 PROCEDURE — 83036 HEMOGLOBIN GLYCOSYLATED A1C: CPT

## 2025-01-22 PROCEDURE — 97165 OT EVAL LOW COMPLEX 30 MIN: CPT

## 2025-01-22 RX ORDER — CARVEDILOL 12.5 MG/1
25 TABLET ORAL 2 TIMES DAILY WITH MEALS
Status: DISCONTINUED | OUTPATIENT
Start: 2025-01-22 | End: 2025-01-23

## 2025-01-22 RX ORDER — FINASTERIDE 5 MG/1
5 TABLET, FILM COATED ORAL DAILY
Status: DISCONTINUED | OUTPATIENT
Start: 2025-01-22 | End: 2025-01-28 | Stop reason: HOSPADM

## 2025-01-22 RX ORDER — DEXTROSE MONOHYDRATE 100 MG/ML
INJECTION, SOLUTION INTRAVENOUS CONTINUOUS PRN
Status: DISCONTINUED | OUTPATIENT
Start: 2025-01-22 | End: 2025-01-28 | Stop reason: HOSPADM

## 2025-01-22 RX ORDER — EZETIMIBE 10 MG/1
10 TABLET ORAL DAILY
Status: DISCONTINUED | OUTPATIENT
Start: 2025-01-22 | End: 2025-01-28 | Stop reason: HOSPADM

## 2025-01-22 RX ORDER — IBUPROFEN 600 MG/1
1 TABLET ORAL PRN
Status: DISCONTINUED | OUTPATIENT
Start: 2025-01-22 | End: 2025-01-28 | Stop reason: HOSPADM

## 2025-01-22 RX ORDER — INSULIN LISPRO 100 [IU]/ML
0-4 INJECTION, SOLUTION INTRAVENOUS; SUBCUTANEOUS
Status: DISCONTINUED | OUTPATIENT
Start: 2025-01-22 | End: 2025-01-28 | Stop reason: HOSPADM

## 2025-01-22 RX ORDER — LOSARTAN POTASSIUM 50 MG/1
100 TABLET ORAL DAILY
Status: DISCONTINUED | OUTPATIENT
Start: 2025-01-22 | End: 2025-01-28 | Stop reason: HOSPADM

## 2025-01-22 RX ORDER — ALLOPURINOL 100 MG/1
100 TABLET ORAL DAILY
Status: DISCONTINUED | OUTPATIENT
Start: 2025-01-22 | End: 2025-01-28 | Stop reason: HOSPADM

## 2025-01-22 RX ADMIN — ALLOPURINOL 100 MG: 100 TABLET ORAL at 10:26

## 2025-01-22 RX ADMIN — EZETIMIBE 10 MG: 10 TABLET ORAL at 10:26

## 2025-01-22 RX ADMIN — RIVAROXABAN 15 MG: 15 TABLET, FILM COATED ORAL at 17:42

## 2025-01-22 RX ADMIN — FINASTERIDE 5 MG: 5 TABLET, FILM COATED ORAL at 10:26

## 2025-01-22 ASSESSMENT — PAIN SCALES - GENERAL: PAINLEVEL_OUTOF10: 0

## 2025-01-22 NOTE — DISCHARGE INSTRUCTIONS
Follow-up care is a key part of your treatment and safety. Be sure to make and go to all appointments, and call your doctor if you are having problems. It's also a good idea to know your test results and keep a list of the medicines you take.    How can you care for yourself at home?   Enter a stroke rehabilitation (rehab) program, if your doctor recommends it. Physical, speech, and occupational therapies can help you manage bathing, dressing, eating, and other basics of daily living.  Eat a heart-healthy diet that is low in cholesterol, saturated fat, and salt. Eat lots of fresh fruits and vegetables and foods high in fiber.  Increase your activities slowly. Take short rest breaks when you get tired. Gradually increase the amount you walk. Start out by walking a little more than you did the day before.  Do not drive until your doctor says it is okay.  It is normal to feel sad or depressed after a stroke. If the “blues” last, talk to your doctor.  If you are having problems with urine leakage, go to the bathroom at regular times, including when you first wake up and at bedtime. Also, limit fluids after dinner.  If you are constipated, drink plenty of fluids, enough so that your urine is light yellow or clear like water. If you have kidney, heart, or liver disease and have to limit fluids, talk with your doctor before you increase the amount of fluids you drink. Set up a regular time for using the toilet. If you continue to have constipation, your doctor may suggest using a bulking agent, such as Metamucil, or a stool softener, laxative, or enema.    Medicines  Take your medicines exactly as prescribed. Call your doctor if you think you are having a problem with your medicine. You may be taking several medicines. ACE (angiotensin-converting enzyme) inhibitors, angiotensin II receptor blockers (ARBs), beta-blockers, diuretics (water pills), and calcium channel blockers control your blood pressure. Statins help lower

## 2025-01-22 NOTE — ED NOTES
TRANSFER - OUT REPORT:    Verbal report given to CHRISSY Echols  on Polo Smith  being transferred to [facility] for routine progression of patient care       Report consisted of patient's Situation, Background, Assessment and   Recommendations(SBAR).     Information from the following report(s) Nurse Handoff Report, ED SBAR, MAR, and Recent Results was reviewed with the receiving nurse.    [place] Fall Assessment:    Presents to emergency department  because of falls (Syncope, seizure, or loss of consciousness): Yes  Age > 70: Yes  Altered Mental Status, Intoxication with alcohol or substance confusion (Disorientation, impaired judgment, poor safety awaremess, or inability to follow instructions): Yes  Impaired Mobility: Ambulates or transfers with assistive devices or assistance; Unable to ambulate or transer.: Yes  Nursing Judgement: Yes          Lines:       Opportunity for questions and clarification was provided.      Patient transported with:  Registered Nurse           Soraya Kwok RN  01/21/25 3661

## 2025-01-22 NOTE — THERAPY EVALUATION
ACUTE OCCUPATIONAL THERAPY GOALS:   (Developed with and agreed upon by patient and/or caregiver.)  (1.) oPlo Smith  will complete lower body bathing and dressing with SUPERVISION and adaptive equipment as needed.    (2.) Polo Smith will complete toileting with SUPERVISION.  (3.) Polo Smith will tolerate 25 minutes of OT treatment with 1-2 rest breaks to increase activity tolerance for ADLs.   (4.) Polo Smith will complete functional transfers with SUPERVISION and adaptive equipment as needed.   (5.) Polo Smith will complete functional ADL task standing at sink SUPERVISION and adaptive equipment as needed.     Timeframe: 7 visits       OCCUPATIONAL THERAPY Initial Assessment and Daily Note       OT Visit Days: 1  Acknowledge Orders  Time  OT Charge Capture  Rehab Caseload Tracker      Polo Smith is a 89 y.o. male   PRIMARY DIAGNOSIS: Aphasia  Aphasia [R47.01]  Speech disturbance, unspecified type [R47.9]       Reason for Referral: Generalized Muscle Weakness (M62.81)  Other lack of cordination (R27.8)  Difficulty in walking, Not elsewhere classified (R26.2)  History of falling (Z91.81)  Inpatient: Payor: MEDICARE / Plan: MEDICARE PART A AND B / Product Type: *No Product type* /     ASSESSMENT:     REHAB RECOMMENDATIONS:   Recommendation to date pending progress:  Setting:  Short-term Rehab    Equipment:    To Be Determined     ASSESSMENT:  Mr. Smith is a 89 y/o male who presented to ED with c/o slurred speech and confusion. Per chart pt lives with his wife however is too confused to answer any other questions about his history - at times even saying he lives with his mother still.     This date, pt presented supine in OFL unit and was trying to get up on his own upon arrival. Pt overall CGA-min A for functional transfers and mobility of household distances with RW. Pt also completed hygiene and lower body dressing due to soiled brief with assist. Pt left seated in bedside chair with all

## 2025-01-22 NOTE — CARE COORDINATION
ASSESSMENT NOTE    Attending Physician: James Wilcox MD  Admit Problem: Aphasia [R47.01]  Speech disturbance, unspecified type [R47.9]  Date/Time of Admission: 1/21/2025 12:48 PM  Problem List:  Patient Active Problem List   Diagnosis    Chronic diastolic heart failure (HCC)    Essential hypertension    Diabetes mellitus type 2, controlled (HCC)    Hyperlipidemia    BPH (benign prostatic hyperplasia)    Elevated prostate specific antigen (PSA)    BPH with obstruction/lower urinary tract symptoms    Hypertensive heart disease with CHF (HCC)    Hypoxemia    Pulmonary hypertension (HCC)    Spinal abscess    Diabetes mellitus type II, uncontrolled    Ex-smoker for more than 1 year    Borderline type 2 diabetes mellitus    Prostate cancer (HCC)    Deep vein thrombosis (DVT) (HCC)    Stroke (HCC)    Bilateral carotid artery disease (HCC)    Anticoagulated on Coumadin    Chest discomfort    Anemia    Wound infection    DVT (deep venous thrombosis) (HCC)    Lumbar stenosis with neurogenic claudication    Aphasia       Service Assessment  Patient Orientation Alert and Oriented, Self   Cognition Dementia / Early Alzheimer's   History Provided By Medical Record   Primary Caregiver Self   Accompanied By/Relationship None   Support Systems Spouse/Significant Other, Children, Family Members   Patient's Healthcare Decision Maker is: Named in Scanned ACP Document   PCP Verified by CM Yes (Basilio Lopez MD)   Last Visit to PCP Within last 3 months   Prior Functional Level Independent in ADLs/IADLs   Current Functional Level Independent in ADLs/IADLs   Can patient return to prior living arrangement Yes   Ability to make needs known: Fair   Family able to assist with home care needs: Yes   Would you like for me to discuss the discharge plan with any other family members/significant others, and if so, who? Yes (Spouse)   Financial Resources Medicare   Community Resources None   CM/SW Referral Other (see comment) (None)

## 2025-01-22 NOTE — FLOWSHEET NOTE
01/22/25 1615   Vital Signs   Orthostatic B/P and Pulse? Yes   Blood Pressure Lying 170/91   Pulse Lying 80 PER MINUTE   Blood Pressure Sitting 154/85   Pulse Sitting 85 PER MINUTE   Blood Pressure Standing 133/73   Pulse Standing 102 PER MINUTE

## 2025-01-22 NOTE — ACP (ADVANCE CARE PLANNING)
Advance Care Planning   Healthcare Decision Maker:    Primary Decision Maker: Jo Smith - Spouse - [phone]    Secondary Decision Maker: LISA SMITH - Child - [phone]    Click here to complete Healthcare Decision Makers including selection of the Healthcare Decision Maker Relationship (ie \"Primary\").  Today we documented Decision Maker(s) consistent with ACP documents on file.       If the relationship to the patient does NOT follow our state's Next of Kin hierarchy, the patient MUST complete an ACP Document to allow him/her to act on the patient's behalf. :  Has ACP documents on file.  Full Code per physician order.

## 2025-01-23 PROBLEM — R29.90 STROKE-LIKE SYMPTOMS: Status: ACTIVE | Noted: 2025-01-23

## 2025-01-23 PROBLEM — D68.59 HYPERCOAGULABLE STATE (HCC): Status: ACTIVE | Noted: 2025-01-23

## 2025-01-23 PROBLEM — G93.41 ACUTE METABOLIC ENCEPHALOPATHY: Status: ACTIVE | Noted: 2025-01-23

## 2025-01-23 PROBLEM — E53.8 VITAMIN B12 DEFICIENCY: Status: ACTIVE | Noted: 2025-01-23

## 2025-01-23 LAB
ANION GAP SERPL CALC-SCNC: 15 MMOL/L (ref 7–16)
BUN SERPL-MCNC: 23 MG/DL (ref 8–23)
CALCIUM SERPL-MCNC: 9.9 MG/DL (ref 8.8–10.2)
CHLORIDE SERPL-SCNC: 101 MMOL/L (ref 98–107)
CO2 SERPL-SCNC: 25 MMOL/L (ref 20–29)
CREAT SERPL-MCNC: 1.13 MG/DL (ref 0.8–1.3)
EKG DIAGNOSIS: NORMAL
EKG Q-T INTERVAL: 384 MS
EKG QRS DURATION: 98 MS
EKG QTC CALCULATION (BAZETT): 440 MS
EKG R AXIS: -45 DEGREES
EKG T AXIS: 11 DEGREES
EKG VENTRICULAR RATE: 79 BPM
ERYTHROCYTE [DISTWIDTH] IN BLOOD BY AUTOMATED COUNT: 13.4 % (ref 11.9–14.6)
GLUCOSE BLD STRIP.AUTO-MCNC: 108 MG/DL (ref 65–100)
GLUCOSE BLD STRIP.AUTO-MCNC: 116 MG/DL (ref 65–100)
GLUCOSE BLD STRIP.AUTO-MCNC: 121 MG/DL (ref 65–100)
GLUCOSE BLD STRIP.AUTO-MCNC: 132 MG/DL (ref 65–100)
GLUCOSE SERPL-MCNC: 123 MG/DL (ref 70–99)
HCT VFR BLD AUTO: 48.8 % (ref 41.1–50.3)
HGB BLD-MCNC: 15.7 G/DL (ref 13.6–17.2)
MAGNESIUM SERPL-MCNC: 2.2 MG/DL (ref 1.8–2.4)
MCH RBC QN AUTO: 28 PG (ref 26.1–32.9)
MCHC RBC AUTO-ENTMCNC: 32.2 G/DL (ref 31.4–35)
MCV RBC AUTO: 87 FL (ref 82–102)
NRBC # BLD: 0 K/UL (ref 0–0.2)
PLATELET # BLD AUTO: 202 K/UL (ref 150–450)
PMV BLD AUTO: 9.7 FL (ref 9.4–12.3)
POTASSIUM SERPL-SCNC: 3.4 MMOL/L (ref 3.5–5.1)
RBC # BLD AUTO: 5.61 M/UL (ref 4.23–5.6)
SERVICE CMNT-IMP: ABNORMAL
SODIUM SERPL-SCNC: 141 MMOL/L (ref 136–145)
WBC # BLD AUTO: 6.4 K/UL (ref 4.3–11.1)

## 2025-01-23 PROCEDURE — 92526 ORAL FUNCTION THERAPY: CPT

## 2025-01-23 PROCEDURE — 36415 COLL VENOUS BLD VENIPUNCTURE: CPT

## 2025-01-23 PROCEDURE — 83735 ASSAY OF MAGNESIUM: CPT

## 2025-01-23 PROCEDURE — 80048 BASIC METABOLIC PNL TOTAL CA: CPT

## 2025-01-23 PROCEDURE — 97530 THERAPEUTIC ACTIVITIES: CPT

## 2025-01-23 PROCEDURE — 82962 GLUCOSE BLOOD TEST: CPT

## 2025-01-23 PROCEDURE — 93010 ELECTROCARDIOGRAM REPORT: CPT | Performed by: INTERNAL MEDICINE

## 2025-01-23 PROCEDURE — 97535 SELF CARE MNGMENT TRAINING: CPT

## 2025-01-23 PROCEDURE — 6370000000 HC RX 637 (ALT 250 FOR IP)

## 2025-01-23 PROCEDURE — 1100000003 HC PRIVATE W/ TELEMETRY

## 2025-01-23 PROCEDURE — 6370000000 HC RX 637 (ALT 250 FOR IP): Performed by: STUDENT IN AN ORGANIZED HEALTH CARE EDUCATION/TRAINING PROGRAM

## 2025-01-23 PROCEDURE — 85027 COMPLETE CBC AUTOMATED: CPT

## 2025-01-23 PROCEDURE — 99222 1ST HOSP IP/OBS MODERATE 55: CPT | Performed by: INTERNAL MEDICINE

## 2025-01-23 RX ORDER — CARVEDILOL 3.12 MG/1
6.25 TABLET ORAL 2 TIMES DAILY WITH MEALS
Status: DISCONTINUED | OUTPATIENT
Start: 2025-01-23 | End: 2025-01-24

## 2025-01-23 RX ORDER — LANOLIN ALCOHOL/MO/W.PET/CERES
1000 CREAM (GRAM) TOPICAL DAILY
Status: DISCONTINUED | OUTPATIENT
Start: 2025-01-23 | End: 2025-01-28 | Stop reason: HOSPADM

## 2025-01-23 RX ADMIN — CYANOCOBALAMIN TAB 1000 MCG 1000 MCG: 1000 TAB at 09:25

## 2025-01-23 RX ADMIN — FINASTERIDE 5 MG: 5 TABLET, FILM COATED ORAL at 09:25

## 2025-01-23 RX ADMIN — ALLOPURINOL 100 MG: 100 TABLET ORAL at 09:25

## 2025-01-23 RX ADMIN — EZETIMIBE 10 MG: 10 TABLET ORAL at 09:25

## 2025-01-23 RX ADMIN — CARVEDILOL 6.25 MG: 3.12 TABLET, FILM COATED ORAL at 17:29

## 2025-01-23 RX ADMIN — RIVAROXABAN 15 MG: 15 TABLET, FILM COATED ORAL at 17:29

## 2025-01-23 NOTE — CONSULTS
Neurology Consult Note       History:   89-year-old male with atrial fibrillation on Xarelto who is being seen for code S. The patient was last known normal around 12:00 today when he reportedly had trouble swallowing his food and was confused.  History obtained per EMS.  Collateral not available at bedside.  This reportedly was preceded by a fall yesterday.  He takes Xarelto. The patient presented to [facility] ED.  A code S was called at 12:48 PM. Neurology arrived to the bedside at 12:51 PM. Initial NIHSS was 6.       Exam: Pertinent positives and negatives include:  NIHSS Score: 6  1a-Level of Consciousness 0  1b-What is Month/Age 0  1c-Open/Close Eyes&Hand 0  2 -Best Gaze 0  3 -Visual Fields 0  4 -Facial Palsy 0  5a-Motor-Left Arm 0  5b-Motor-Right Arm 0  6a-Motor-Left Leg 2  6b-Motor-Right Leg 2  7 -Limb Ataxia 0  8 -Sensory 0  9 -Best Language 1  10-Dysarthria 1  11-Extinction/Inattention 0    Imaging and review of data:   CT head with multifocal regions of encephalomalacia involving the left hemisphere.  No acute changes seen.      Assessment:     89-year-old male seen as a code S with reportedly abrupt onset language deficits. Initial NIHSS was 6, though partially explained by nonlocalizing features. The patient was not a candidate for tenecteplase because he is on Xarelto . The patient was not a candidate for mechanical thrombectomy because of low NIHSS.    Plan:     Admit for observation and completion of acute ischemic stroke workup (MRI, TTE).  Other considerations include recrudescence of old stroke symptoms, focal aware seizure, acute confusional state in the setting of neurodegenerative disease.    Permissive hypertension <220/110 for the next 24 hours, then gradually lower to goal -180, DBP <100, MAPs >65.    Resume home Xarelto. Continue Zetia.    Further recommendations to follow.      Fox Anderson, DO  Neurology      I spent 35 minutes today with the patient, which included chart review, 
Nutrition Assessment  Assessment Type: Initial, Consult  Reason for visit:  General Nutrition Management/other reason (Hospitalists)  Malnutrition Screening Tool Score: 0    Nutrition Intervention:   Food and/or Nutrient Delivery:   Meals and Snacks:  Diet: Continue current order  Medical Food Supplements:   Medical food supplement therapy:  Initiate Ensure Plus High Protein (high calorie high protein) 350 calories, 20 grams protein per 8 ounce serving      Malnutrition Assessment:  Academy/A.S.P.E.N Clinical Malnutrition Criteria  Malnutrition Status: Moderate malnutrition  Context: Chronic Illness  Findings of clinical characteristics of malnutrition:   Energy Intake:  Unable to assess (Son report poor intake pta but unsure how long)  Weight Loss:  Unable to assess     Body Fat Loss:  Mild body fat loss Triceps, Buccal region   Muscle Mass Loss:  Mild muscle mass loss Temples (temporalis), Clavicles (pectoralis & deltoids)  Fluid Accumulation:  Unable to assess     Strength:  Not Performed    Nutrition Assessment:  Food/Nutrition Related History: Pt is confused at visit. Son stated pts wife cares for him at home. He reports thy prepare mostly microwave meals. Son stated he does not think pt was eating well pta. He stated pt usually consumes 2 premier protein shakes each day.      Do You Have Any Cultural, Adventism, or Ethnic Food Preferences?: No   Weight History: Pt reports he thinks he has lost wt. He stated he had to tighten his belt. Son endorses wt loss. Limited EMR wt hx review 1/23/2024 176lb (FM).   Nutrition Background:       PMH significant for afib, HLD, and BPH. Pt admitted with acute metabolic encephalopathy, stroke like symptoms, and vitamin B12 deficiency.   Nutrition Monitoring/Evaluation:  Pt seen sitting in recliner. He is confused at visit. Son present. Son reports hx as above. Son stated pt is eating better here than he does at home. Son reports pt consumed ~1/3 of lunch. Pt is agreeable 
 Types: Cigarettes     Start date: 1950    Smokeless tobacco: Never   Substance Use Topics    Alcohol use: Not on file     Prior to Admission medications    Medication Sig Start Date End Date Taking? Authorizing Provider   allopurinol (ZYLOPRIM) 100 MG tablet Take 1 tablet by mouth daily    Bushra Silva MD   glipiZIDE (GLUCOTROL) 5 MG tablet Take 1 tablet by mouth 2 times daily (before meals)    Bushra Silva MD   rivaroxaban (XARELTO) 15 MG TABS tablet Take 1 tablet by mouth    Bushra Silva MD   carvedilol (COREG) 25 MG tablet Take 1 tablet by mouth 2 times daily (with meals)    Bushra Silva MD   losartan (COZAAR) 100 MG tablet Take 1 tablet by mouth daily    Bushra Silva MD   ezetimibe (ZETIA) 10 MG tablet Take 1 tablet by mouth daily    Bushra Silva MD   finasteride (PROSCAR) 5 MG tablet Take 1 tablet by mouth daily    Bushra Silva MD       Allergies   Allergen Reactions    Statins Other (See Comments)     Restless legs/night terrors    Night terrors    Night horrors        Comprehensive review of systems completed and negative with exception of noted above     Objective:     Visit Vitals  /87   Pulse 97   Temp 97.3 °F (36.3 °C) (Oral)   Resp 19   Ht 1.88 m (6' 2\")   Wt 74.4 kg (164 lb 0.4 oz)   SpO2 99%   BMI 21.06 kg/m²        Physical Exam:    General:  Cooperative. No acute distress.   Eyes:  Conjunctivae/corneas clear    Nose: Nares normal. Septum midline.   Neck: Supple, symmetrical, trachea midline, no JVD   Lungs:   Clear to auscultation bilaterally, unlabored   Heart:  Regular rate and rhythm, no murmur    Abdomen:   Soft, non-tender, non-distended. Positive bowel sounds   Extremities: Normal, atraumatic, no cyanosis or edema   Skin: Skin color, texture, turgor normal. No rash or lesions.   Neurologic: Nonfocal   Psych: Alert but confused       Signed By: Melvin Gongora MD     January 23, 2025

## 2025-01-23 NOTE — PLAN OF CARE
Problem: Chronic Conditions and Co-morbidities  Goal: Patient's chronic conditions and co-morbidity symptoms are monitored and maintained or improved  1/23/2025 1003 by Ale Pradhan RN  Outcome: Progressing  1/22/2025 2230 by Lizbet Ledbetter RN  Outcome: Progressing     Problem: Discharge Planning  Goal: Discharge to home or other facility with appropriate resources  1/23/2025 1003 by Ale Pradhan RN  Outcome: Progressing  1/22/2025 2230 by Lizbet Ledbetter RN  Outcome: Progressing     Problem: Safety - Adult  Goal: Free from fall injury  1/23/2025 1003 by Ale Pradhan RN  Outcome: Progressing  1/22/2025 2230 by Lizbet Ledbetter RN  Outcome: Progressing     Problem: Skin/Tissue Integrity  Goal: Absence of new skin breakdown  Description: 1.  Monitor for areas of redness and/or skin breakdown  2.  Assess vascular access sites hourly  3.  Every 4-6 hours minimum:  Change oxygen saturation probe site  4.  Every 4-6 hours:  If on nasal continuous positive airway pressure, respiratory therapy assess nares and determine need for appliance change or resting period.  1/23/2025 1003 by Ale Pradhan RN  Outcome: Progressing  1/22/2025 2230 by Lizbet Ledbetter RN  Outcome: Progressing     Problem: ABCDS Injury Assessment  Goal: Absence of physical injury  1/23/2025 1003 by Ale Pradhan RN  Outcome: Progressing  1/22/2025 2230 by Lizbet Ledbetter RN  Outcome: Progressing

## 2025-01-23 NOTE — CARE COORDINATION
PT/OT recommending STR, CM gave choice list to family in pt room, family will review and make at lease 3 choices and leave in room for CM to  tmrw morning and send referral. CM will continue to follow.

## 2025-01-24 LAB
ANION GAP SERPL CALC-SCNC: 12 MMOL/L (ref 7–16)
BUN SERPL-MCNC: 19 MG/DL (ref 8–23)
CALCIUM SERPL-MCNC: 9.9 MG/DL (ref 8.8–10.2)
CHLORIDE SERPL-SCNC: 100 MMOL/L (ref 98–107)
CO2 SERPL-SCNC: 26 MMOL/L (ref 20–29)
CREAT SERPL-MCNC: 1.02 MG/DL (ref 0.8–1.3)
ERYTHROCYTE [DISTWIDTH] IN BLOOD BY AUTOMATED COUNT: 13.4 % (ref 11.9–14.6)
GLUCOSE BLD STRIP.AUTO-MCNC: 125 MG/DL (ref 65–100)
GLUCOSE BLD STRIP.AUTO-MCNC: 128 MG/DL (ref 65–100)
GLUCOSE BLD STRIP.AUTO-MCNC: 153 MG/DL (ref 65–100)
GLUCOSE BLD STRIP.AUTO-MCNC: 97 MG/DL (ref 65–100)
GLUCOSE SERPL-MCNC: 123 MG/DL (ref 70–99)
HCT VFR BLD AUTO: 48.3 % (ref 41.1–50.3)
HGB BLD-MCNC: 15.6 G/DL (ref 13.6–17.2)
MAGNESIUM SERPL-MCNC: 2.2 MG/DL (ref 1.8–2.4)
MCH RBC QN AUTO: 27.8 PG (ref 26.1–32.9)
MCHC RBC AUTO-ENTMCNC: 32.3 G/DL (ref 31.4–35)
MCV RBC AUTO: 85.9 FL (ref 82–102)
NRBC # BLD: 0 K/UL (ref 0–0.2)
PLATELET # BLD AUTO: 205 K/UL (ref 150–450)
PMV BLD AUTO: 9.6 FL (ref 9.4–12.3)
POTASSIUM SERPL-SCNC: 3.5 MMOL/L (ref 3.5–5.1)
RBC # BLD AUTO: 5.62 M/UL (ref 4.23–5.6)
SERVICE CMNT-IMP: ABNORMAL
SERVICE CMNT-IMP: NORMAL
SODIUM SERPL-SCNC: 138 MMOL/L (ref 136–145)
WBC # BLD AUTO: 6.6 K/UL (ref 4.3–11.1)

## 2025-01-24 PROCEDURE — 1100000003 HC PRIVATE W/ TELEMETRY

## 2025-01-24 PROCEDURE — 92526 ORAL FUNCTION THERAPY: CPT

## 2025-01-24 PROCEDURE — 80048 BASIC METABOLIC PNL TOTAL CA: CPT

## 2025-01-24 PROCEDURE — 6370000000 HC RX 637 (ALT 250 FOR IP): Performed by: STUDENT IN AN ORGANIZED HEALTH CARE EDUCATION/TRAINING PROGRAM

## 2025-01-24 PROCEDURE — 85027 COMPLETE CBC AUTOMATED: CPT

## 2025-01-24 PROCEDURE — 6370000000 HC RX 637 (ALT 250 FOR IP)

## 2025-01-24 PROCEDURE — 97530 THERAPEUTIC ACTIVITIES: CPT

## 2025-01-24 PROCEDURE — 82962 GLUCOSE BLOOD TEST: CPT

## 2025-01-24 PROCEDURE — 36415 COLL VENOUS BLD VENIPUNCTURE: CPT

## 2025-01-24 PROCEDURE — 83735 ASSAY OF MAGNESIUM: CPT

## 2025-01-24 PROCEDURE — 1100000000 HC RM PRIVATE

## 2025-01-24 RX ORDER — CARVEDILOL 12.5 MG/1
25 TABLET ORAL 2 TIMES DAILY WITH MEALS
Status: DISCONTINUED | OUTPATIENT
Start: 2025-01-24 | End: 2025-01-28 | Stop reason: HOSPADM

## 2025-01-24 RX ORDER — CARVEDILOL 12.5 MG/1
12.5 TABLET ORAL 2 TIMES DAILY WITH MEALS
Status: DISCONTINUED | OUTPATIENT
Start: 2025-01-24 | End: 2025-01-24

## 2025-01-24 RX ADMIN — FINASTERIDE 5 MG: 5 TABLET, FILM COATED ORAL at 10:47

## 2025-01-24 RX ADMIN — ALLOPURINOL 100 MG: 100 TABLET ORAL at 10:47

## 2025-01-24 RX ADMIN — CARVEDILOL 25 MG: 12.5 TABLET, FILM COATED ORAL at 18:37

## 2025-01-24 RX ADMIN — LOSARTAN POTASSIUM 100 MG: 50 TABLET, FILM COATED ORAL at 10:47

## 2025-01-24 RX ADMIN — ACETAMINOPHEN 650 MG: 325 TABLET ORAL at 18:37

## 2025-01-24 RX ADMIN — RIVAROXABAN 15 MG: 15 TABLET, FILM COATED ORAL at 18:37

## 2025-01-24 RX ADMIN — EZETIMIBE 10 MG: 10 TABLET ORAL at 10:47

## 2025-01-24 RX ADMIN — CARVEDILOL 6.25 MG: 3.12 TABLET, FILM COATED ORAL at 10:54

## 2025-01-24 RX ADMIN — CYANOCOBALAMIN TAB 1000 MCG 1000 MCG: 1000 TAB at 10:47

## 2025-01-24 NOTE — CARE COORDINATION
RN CM in room, patient lying in bed, family at bedside. Choices for STR obtained.   [facility] - Tuesday   [facility] - next week  [facility] - no beds    Referral sent and RN CM will continue to follow.     1610 update - Dr Patel and Primary RN aware of STR availability. Spoke to son Andrés and they will look over list left in room. And will see about another choice.

## 2025-01-24 NOTE — PLAN OF CARE
Problem: Chronic Conditions and Co-morbidities  Goal: Patient's chronic conditions and co-morbidity symptoms are monitored and maintained or improved  1/23/2025 2229 by Qi King RN  Outcome: Progressing  Flowsheets (Taken 1/23/2025 2050)  Care Plan - Patient's Chronic Conditions and Co-Morbidity Symptoms are Monitored and Maintained or Improved: Monitor and assess patient's chronic conditions and comorbid symptoms for stability, deterioration, or improvement  1/23/2025 1003 by Ale Pradhan, RN  Outcome: Progressing     Problem: Discharge Planning  Goal: Discharge to home or other facility with appropriate resources  1/23/2025 2229 by Qi King RN  Outcome: Progressing  1/23/2025 1003 by Ale Pradhan RN  Outcome: Progressing     Problem: Safety - Adult  Goal: Free from fall injury  1/23/2025 2229 by Qi King RN  Outcome: Progressing  1/23/2025 1003 by Ale Pradhan RN  Outcome: Progressing     Problem: Skin/Tissue Integrity  Goal: Absence of new skin breakdown  Description: 1.  Monitor for areas of redness and/or skin breakdown  2.  Assess vascular access sites hourly  3.  Every 4-6 hours minimum:  Change oxygen saturation probe site  4.  Every 4-6 hours:  If on nasal continuous positive airway pressure, respiratory therapy assess nares and determine need for appliance change or resting period.  1/23/2025 2229 by Qi King RN  Outcome: Progressing  1/23/2025 1003 by Ale Pradhan RN  Outcome: Progressing     Problem: ABCDS Injury Assessment  Goal: Absence of physical injury  1/23/2025 2229 by Qi King RN  Outcome: Progressing  1/23/2025 1003 by Ale Pradhan RN  Outcome: Progressing

## 2025-01-25 LAB
ANION GAP SERPL CALC-SCNC: 13 MMOL/L (ref 7–16)
BUN SERPL-MCNC: 26 MG/DL (ref 8–23)
CALCIUM SERPL-MCNC: 9.8 MG/DL (ref 8.8–10.2)
CHLORIDE SERPL-SCNC: 100 MMOL/L (ref 98–107)
CO2 SERPL-SCNC: 26 MMOL/L (ref 20–29)
CREAT SERPL-MCNC: 1.27 MG/DL (ref 0.8–1.3)
ERYTHROCYTE [DISTWIDTH] IN BLOOD BY AUTOMATED COUNT: 13.4 % (ref 11.9–14.6)
GLUCOSE BLD STRIP.AUTO-MCNC: 124 MG/DL (ref 65–100)
GLUCOSE BLD STRIP.AUTO-MCNC: 127 MG/DL (ref 65–100)
GLUCOSE BLD STRIP.AUTO-MCNC: 135 MG/DL (ref 65–100)
GLUCOSE BLD STRIP.AUTO-MCNC: 179 MG/DL (ref 65–100)
GLUCOSE SERPL-MCNC: 130 MG/DL (ref 70–99)
HCT VFR BLD AUTO: 48.3 % (ref 41.1–50.3)
HGB BLD-MCNC: 15.7 G/DL (ref 13.6–17.2)
MCH RBC QN AUTO: 28.3 PG (ref 26.1–32.9)
MCHC RBC AUTO-ENTMCNC: 32.5 G/DL (ref 31.4–35)
MCV RBC AUTO: 87 FL (ref 82–102)
NRBC # BLD: 0 K/UL (ref 0–0.2)
PLATELET # BLD AUTO: 187 K/UL (ref 150–450)
PMV BLD AUTO: 9.7 FL (ref 9.4–12.3)
POTASSIUM SERPL-SCNC: 3.8 MMOL/L (ref 3.5–5.1)
RBC # BLD AUTO: 5.55 M/UL (ref 4.23–5.6)
SERVICE CMNT-IMP: ABNORMAL
SODIUM SERPL-SCNC: 139 MMOL/L (ref 136–145)
WBC # BLD AUTO: 5.7 K/UL (ref 4.3–11.1)

## 2025-01-25 PROCEDURE — 82962 GLUCOSE BLOOD TEST: CPT

## 2025-01-25 PROCEDURE — 80048 BASIC METABOLIC PNL TOTAL CA: CPT

## 2025-01-25 PROCEDURE — 1100000003 HC PRIVATE W/ TELEMETRY

## 2025-01-25 PROCEDURE — 6370000000 HC RX 637 (ALT 250 FOR IP)

## 2025-01-25 PROCEDURE — 6370000000 HC RX 637 (ALT 250 FOR IP): Performed by: STUDENT IN AN ORGANIZED HEALTH CARE EDUCATION/TRAINING PROGRAM

## 2025-01-25 PROCEDURE — 85027 COMPLETE CBC AUTOMATED: CPT

## 2025-01-25 PROCEDURE — 36415 COLL VENOUS BLD VENIPUNCTURE: CPT

## 2025-01-25 PROCEDURE — 1100000000 HC RM PRIVATE

## 2025-01-25 RX ORDER — MENTHOL/CAMPHOR/ALLANTOIN/PHE 0.6-0.5-1%
OINTMENT(EA) TOPICAL PRN
Status: DISCONTINUED | OUTPATIENT
Start: 2025-01-25 | End: 2025-01-28 | Stop reason: HOSPADM

## 2025-01-25 RX ADMIN — ALLOPURINOL 100 MG: 100 TABLET ORAL at 09:18

## 2025-01-25 RX ADMIN — CYANOCOBALAMIN TAB 1000 MCG 1000 MCG: 1000 TAB at 09:18

## 2025-01-25 RX ADMIN — CARVEDILOL 25 MG: 12.5 TABLET, FILM COATED ORAL at 18:39

## 2025-01-25 RX ADMIN — FINASTERIDE 5 MG: 5 TABLET, FILM COATED ORAL at 09:18

## 2025-01-25 RX ADMIN — RIVAROXABAN 15 MG: 15 TABLET, FILM COATED ORAL at 18:39

## 2025-01-25 RX ADMIN — EZETIMIBE 10 MG: 10 TABLET ORAL at 09:18

## 2025-01-25 RX ADMIN — LOSARTAN POTASSIUM 100 MG: 50 TABLET, FILM COATED ORAL at 09:18

## 2025-01-25 RX ADMIN — CARVEDILOL 25 MG: 12.5 TABLET, FILM COATED ORAL at 09:18

## 2025-01-25 ASSESSMENT — PAIN SCALES - GENERAL
PAINLEVEL_OUTOF10: 0

## 2025-01-25 NOTE — CARE COORDINATION
LMSW follow up with pt's son about rehab referrals.  Bed offer has been received and accepted for [facility].  CM staff will follow up Monday am if pt is stable for D/C to confirm bed availability for Monday or when pt is stable.

## 2025-01-26 LAB
ANION GAP SERPL CALC-SCNC: 18 MMOL/L (ref 7–16)
BUN SERPL-MCNC: 31 MG/DL (ref 8–23)
CALCIUM SERPL-MCNC: 10.2 MG/DL (ref 8.8–10.2)
CHLORIDE SERPL-SCNC: 102 MMOL/L (ref 98–107)
CO2 SERPL-SCNC: 22 MMOL/L (ref 20–29)
CREAT SERPL-MCNC: 1.27 MG/DL (ref 0.8–1.3)
ERYTHROCYTE [DISTWIDTH] IN BLOOD BY AUTOMATED COUNT: 13.4 % (ref 11.9–14.6)
GLUCOSE BLD STRIP.AUTO-MCNC: 130 MG/DL (ref 65–100)
GLUCOSE BLD STRIP.AUTO-MCNC: 137 MG/DL (ref 65–100)
GLUCOSE BLD STRIP.AUTO-MCNC: 140 MG/DL (ref 65–100)
GLUCOSE BLD STRIP.AUTO-MCNC: 148 MG/DL (ref 65–100)
GLUCOSE SERPL-MCNC: 138 MG/DL (ref 70–99)
HCT VFR BLD AUTO: 48.4 % (ref 41.1–50.3)
HGB BLD-MCNC: 15.6 G/DL (ref 13.6–17.2)
MAGNESIUM SERPL-MCNC: 2.2 MG/DL (ref 1.8–2.4)
MCH RBC QN AUTO: 28.1 PG (ref 26.1–32.9)
MCHC RBC AUTO-ENTMCNC: 32.2 G/DL (ref 31.4–35)
MCV RBC AUTO: 87.1 FL (ref 82–102)
NRBC # BLD: 0 K/UL (ref 0–0.2)
PLATELET # BLD AUTO: 185 K/UL (ref 150–450)
PMV BLD AUTO: 9.9 FL (ref 9.4–12.3)
POTASSIUM SERPL-SCNC: 3.5 MMOL/L (ref 3.5–5.1)
RBC # BLD AUTO: 5.56 M/UL (ref 4.23–5.6)
SERVICE CMNT-IMP: ABNORMAL
SODIUM SERPL-SCNC: 142 MMOL/L (ref 136–145)
WBC # BLD AUTO: 6.2 K/UL (ref 4.3–11.1)

## 2025-01-26 PROCEDURE — 6370000000 HC RX 637 (ALT 250 FOR IP)

## 2025-01-26 PROCEDURE — 80048 BASIC METABOLIC PNL TOTAL CA: CPT

## 2025-01-26 PROCEDURE — 85027 COMPLETE CBC AUTOMATED: CPT

## 2025-01-26 PROCEDURE — 1100000003 HC PRIVATE W/ TELEMETRY

## 2025-01-26 PROCEDURE — 1100000000 HC RM PRIVATE

## 2025-01-26 PROCEDURE — 82962 GLUCOSE BLOOD TEST: CPT

## 2025-01-26 PROCEDURE — 36415 COLL VENOUS BLD VENIPUNCTURE: CPT

## 2025-01-26 PROCEDURE — 83735 ASSAY OF MAGNESIUM: CPT

## 2025-01-26 PROCEDURE — 6370000000 HC RX 637 (ALT 250 FOR IP): Performed by: STUDENT IN AN ORGANIZED HEALTH CARE EDUCATION/TRAINING PROGRAM

## 2025-01-26 PROCEDURE — 6360000002 HC RX W HCPCS: Performed by: FAMILY MEDICINE

## 2025-01-26 PROCEDURE — 2500000003 HC RX 250 WO HCPCS: Performed by: FAMILY MEDICINE

## 2025-01-26 RX ORDER — HALOPERIDOL 0.5 MG/1
0.25 TABLET ORAL NIGHTLY
Status: DISCONTINUED | OUTPATIENT
Start: 2025-01-26 | End: 2025-01-28 | Stop reason: HOSPADM

## 2025-01-26 RX ADMIN — ALLOPURINOL 100 MG: 100 TABLET ORAL at 11:50

## 2025-01-26 RX ADMIN — CARVEDILOL 25 MG: 12.5 TABLET, FILM COATED ORAL at 11:59

## 2025-01-26 RX ADMIN — LOSARTAN POTASSIUM 100 MG: 50 TABLET, FILM COATED ORAL at 12:00

## 2025-01-26 RX ADMIN — CARVEDILOL 25 MG: 12.5 TABLET, FILM COATED ORAL at 17:54

## 2025-01-26 RX ADMIN — HALOPERIDOL 0.25 MG: 0.5 TABLET ORAL at 22:09

## 2025-01-26 RX ADMIN — EZETIMIBE 10 MG: 10 TABLET ORAL at 11:59

## 2025-01-26 RX ADMIN — ZIPRASIDONE MESYLATE 10 MG: 20 INJECTION, POWDER, LYOPHILIZED, FOR SOLUTION INTRAMUSCULAR at 03:49

## 2025-01-26 RX ADMIN — RIVAROXABAN 15 MG: 15 TABLET, FILM COATED ORAL at 17:54

## 2025-01-26 RX ADMIN — FINASTERIDE 5 MG: 5 TABLET, FILM COATED ORAL at 11:59

## 2025-01-26 RX ADMIN — CYANOCOBALAMIN TAB 1000 MCG 1000 MCG: 1000 TAB at 12:00

## 2025-01-26 ASSESSMENT — PAIN SCALES - GENERAL
PAINLEVEL_OUTOF10: 0
PAINLEVEL_OUTOF10: 0

## 2025-01-26 NOTE — CARE COORDINATION
Follow up with spouse and son at bedside this afternoon.  Anticipate pt will be stable for transfer to SNF rehab tomorrow if bed available at [facility].  CM staff will follow up Monday am with [facility] liaison to coordinate pt transfer to next available bed.  2nd IM signed by son and scanned to chart.

## 2025-01-27 LAB
GLUCOSE BLD STRIP.AUTO-MCNC: 128 MG/DL (ref 65–100)
GLUCOSE BLD STRIP.AUTO-MCNC: 134 MG/DL (ref 65–100)
GLUCOSE BLD STRIP.AUTO-MCNC: 141 MG/DL (ref 65–100)
GLUCOSE BLD STRIP.AUTO-MCNC: 164 MG/DL (ref 65–100)
SARS-COV-2 RDRP RESP QL NAA+PROBE: NOT DETECTED
SERVICE CMNT-IMP: ABNORMAL
SOURCE: NORMAL

## 2025-01-27 PROCEDURE — 6370000000 HC RX 637 (ALT 250 FOR IP)

## 2025-01-27 PROCEDURE — 92526 ORAL FUNCTION THERAPY: CPT

## 2025-01-27 PROCEDURE — 6370000000 HC RX 637 (ALT 250 FOR IP): Performed by: STUDENT IN AN ORGANIZED HEALTH CARE EDUCATION/TRAINING PROGRAM

## 2025-01-27 PROCEDURE — 97530 THERAPEUTIC ACTIVITIES: CPT

## 2025-01-27 PROCEDURE — 87635 SARS-COV-2 COVID-19 AMP PRB: CPT

## 2025-01-27 PROCEDURE — 82962 GLUCOSE BLOOD TEST: CPT

## 2025-01-27 PROCEDURE — 1100000000 HC RM PRIVATE

## 2025-01-27 RX ADMIN — RIVAROXABAN 15 MG: 15 TABLET, FILM COATED ORAL at 17:48

## 2025-01-27 RX ADMIN — ALLOPURINOL 100 MG: 100 TABLET ORAL at 09:34

## 2025-01-27 RX ADMIN — FINASTERIDE 5 MG: 5 TABLET, FILM COATED ORAL at 09:34

## 2025-01-27 RX ADMIN — CARVEDILOL 25 MG: 12.5 TABLET, FILM COATED ORAL at 09:34

## 2025-01-27 RX ADMIN — CYANOCOBALAMIN TAB 1000 MCG 1000 MCG: 1000 TAB at 09:34

## 2025-01-27 RX ADMIN — CARVEDILOL 25 MG: 12.5 TABLET, FILM COATED ORAL at 17:48

## 2025-01-27 RX ADMIN — EZETIMIBE 10 MG: 10 TABLET ORAL at 09:34

## 2025-01-27 RX ADMIN — LOSARTAN POTASSIUM 100 MG: 50 TABLET, FILM COATED ORAL at 09:34

## 2025-01-27 RX ADMIN — HALOPERIDOL 0.25 MG: 0.5 TABLET ORAL at 20:53

## 2025-01-27 ASSESSMENT — PAIN SCALES - GENERAL: PAINLEVEL_OUTOF10: 0

## 2025-01-27 NOTE — CARE COORDINATION
Chart reviewed for continued stay and BARRERA.  Reached out to [facility]-. They do not have a make bed today. They expect to have one tomorrow. MD made aware. Packet started.    Update 1640: Pt will can go to room 221A report number [phone] unit 2 on 1/28/2025.

## 2025-01-28 VITALS
TEMPERATURE: 97.3 F | HEART RATE: 87 BPM | HEIGHT: 74 IN | BODY MASS INDEX: 21.05 KG/M2 | OXYGEN SATURATION: 96 % | RESPIRATION RATE: 18 BRPM | SYSTOLIC BLOOD PRESSURE: 149 MMHG | DIASTOLIC BLOOD PRESSURE: 97 MMHG | WEIGHT: 164.02 LBS

## 2025-01-28 LAB
GLUCOSE BLD STRIP.AUTO-MCNC: 133 MG/DL (ref 65–100)
GLUCOSE BLD STRIP.AUTO-MCNC: 210 MG/DL (ref 65–100)
SERVICE CMNT-IMP: ABNORMAL
SERVICE CMNT-IMP: ABNORMAL

## 2025-01-28 PROCEDURE — 6370000000 HC RX 637 (ALT 250 FOR IP): Performed by: STUDENT IN AN ORGANIZED HEALTH CARE EDUCATION/TRAINING PROGRAM

## 2025-01-28 PROCEDURE — 82962 GLUCOSE BLOOD TEST: CPT

## 2025-01-28 PROCEDURE — 6370000000 HC RX 637 (ALT 250 FOR IP)

## 2025-01-28 RX ADMIN — ALLOPURINOL 100 MG: 100 TABLET ORAL at 09:18

## 2025-01-28 RX ADMIN — CYANOCOBALAMIN TAB 1000 MCG 1000 MCG: 1000 TAB at 09:17

## 2025-01-28 RX ADMIN — CARVEDILOL 25 MG: 12.5 TABLET, FILM COATED ORAL at 09:17

## 2025-01-28 RX ADMIN — INSULIN LISPRO 1 UNITS: 100 INJECTION, SOLUTION INTRAVENOUS; SUBCUTANEOUS at 12:17

## 2025-01-28 RX ADMIN — LOSARTAN POTASSIUM 100 MG: 50 TABLET, FILM COATED ORAL at 09:17

## 2025-01-28 RX ADMIN — EZETIMIBE 10 MG: 10 TABLET ORAL at 09:17

## 2025-01-28 RX ADMIN — FINASTERIDE 5 MG: 5 TABLET, FILM COATED ORAL at 09:17

## 2025-01-28 ASSESSMENT — PAIN SCALES - GENERAL
PAINLEVEL_OUTOF10: 0
PAINLEVEL_OUTOF10: 0

## 2025-01-28 NOTE — PROGRESS NOTES
Hospitalist Progress Note   Admit Date:  2025 12:48 PM   Name:  Polo Smith   Age:  89 y.o.  Sex:  male  :  10/9/1934   MRN:  [id number]   Room:  [place] [address]    Presenting/Chief Complaint: Aphasia     Reason(s) for Admission: Aphasia [R47.01]  Speech disturbance, unspecified type [R47.9]     Hospital Course:   Polo Smith is a 89 y.o. male with medical history of atrial fibrillation on Xarelto, hyperlipidemia, BPH who presents to the hospital complaining of dysphagia and confusion.  Symptoms started this morning and presented to the hospital via EMS.  Had Code S called at 1248 neurology evaluated.  Initial NIHSS was 6.  Last dose of Xarelto was on the morning .  CT head negative.  CTA neck showed proximal vertebral artery stenosis but no LVO.  Hospital medicine was consulted for stroke workup       Subjective & 24hr Events:   No acute overnight events.    AxO x1.  No obvious slurred speech.  Having breakfast.      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  -No evidence of infectious etiology  -Will order TSH, T4, B12, folate, ammonia  -Permissive hypertension  -Continue ezetimibe and Xarelto      Paroxysmal atrial fibrillation  History of DVT  -Continue Xarelto  -Currently holding Coreg due to permissive hypertension      HFpEF  -Currently holding beta-blocker due to permissive hypertension      Essential hypertension  -Continue to hold home med Coreg and losartan for permissive hypertension      Type 2 diabetes  -A1c 5.8% on 25  -Hold home med glipizide to prevent hypoglycemia during this hospitalization  -Sliding scale      History of gout  -Continue allopurinol      BPH  -Continue finasteride      Moderate protein calorie malnutrition  -BMI <22 
       Hospitalist Progress Note   Admit Date:  2025 12:48 PM   Name:  Polo Smith   Age:  89 y.o.  Sex:  male  :  10/9/1934   MRN:  [id number]   Room:  [place] [address]    Presenting/Chief Complaint: Aphasia     Reason(s) for Admission: Aphasia [R47.01]  Speech disturbance, unspecified type [R47.9]     Hospital Course:   Polo Smith is a 89 y.o. male with medical history of atrial fibrillation on Xarelto, hyperlipidemia, BPH who presents to the hospital complaining of dysphagia and confusion. Symptoms started this morning and presented to the hospital via EMS. Had Code S called at 1248 neurology evaluated. Initial NIHSS was 6. Last dose of Xarelto was on the morning . CT head negative. CTA neck showed proximal vertebral artery stenosis but no LVO. Hospital medicine was consulted for stroke workup     Subjective & 24hr Events:   Hypertensive overnight will add back full dose Coreg today, otherwise vital signs stable.  Mild hypokalemia yesterday.  Otherwise labs appropriate.  Reviewed CT head without contrast from admission.  Currently awaiting STR.    There were no overnight events.  Discussed care with patient, wife and son who is at bedside.    Assessment & Plan:   Acute metabolic encephalopathy-resolved  Strokelike symptoms-resolved  Vitamin B12 deficiency  - NIHSS 6 on admission, currently 0  - CT head and CTA negative for any evidence of acute stroke  - MRI was ordered but contraindicated secondary to titanium bonita and spine  - EEG was ordered, not recommended by neurology  - No evidence of infectious etiology  - TSH, T4, B12, folate and ammonia ordered, B12 noted to be deficient  - Had a period of permissive hypertension  - Coreg full dose to resume tonight 2025  - Continuing ezetimibe and Xarelto    Paroxysmal atrial fibrillation  History of DVT  Hypercoagulable state  - Continuing Xarelto  - Home dose Coreg resumed Le[place]    HFpEF  Essential hypertension  - Continue home dose Coreg  - Continuing 
       Hospitalist Progress Note   Admit Date:  2025 12:48 PM   Name:  Polo Smith   Age:  89 y.o.  Sex:  male  :  10/9/1934   MRN:  [id number]   Room:  [place] [address]    Presenting/Chief Complaint: Aphasia     Reason(s) for Admission: Aphasia [R47.01]  Speech disturbance, unspecified type [R47.9]     Hospital Course:   Polo Smith is a 89 y.o. male with medical history of atrial fibrillation on Xarelto, hyperlipidemia, BPH who presents to the hospital complaining of dysphagia and confusion.  Symptoms started this morning and presented to the hospital via EMS.  Had Code S called at 1248 neurology evaluated.  Initial NIHSS was 6.  Last dose of Xarelto was on the morning .  CT head negative.  CTA neck showed proximal vertebral artery stenosis but no LVO.  Hospital medicine was consulted for stroke workup       Subjective & 24hr Events:   No acute overnight events.    AxO x1.  No obvious slurred speech.  Having breakfast. No pain or any issues.      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  -No evidence of infectious etiology  -Ordered TSH, T4, B12, folate, ammonia. B12 deficient. Will order B12 pills  -s/p permissive hypertension. Resume home anti-hypertensives. Will order a lower dose of Coreg.  -Continue ezetimibe and Xarelto      Paroxysmal atrial fibrillation  History of DVT  Hypercoagulable state  -Continue Xarelto  -Resume Coreg at a lower dose.      HFpEF  -Resume beta-blocker      Essential hypertension  -Resume losartan and Coreg (at a lower dose)      Type 2 diabetes  -A1c 5.8% on 25  -Hold home med glipizide to prevent hypoglycemia during this hospitalization  -Continue sliding scale      History of 
       Hospitalist Progress Note   Admit Date:  2025 12:48 PM   Name:  Polo Smith   Age:  89 y.o.  Sex:  male  :  10/9/1934   MRN:  [id number]   Room:  [place] [address]    Presenting/Chief Complaint: Aphasia     Reason(s) for Admission: Aphasia [R47.01]  Speech disturbance, unspecified type [R47.9]     Hospital Course:   Polo Smith is a 89 y.o. male with medical history of atrial fibrillation on Xarelto, hyperlipidemia, BPH who presents to the hospital complaining of dysphagia and confusion. Symptoms started this morning and presented to the hospital via EMS. Had Code S called at 1248 neurology evaluated. Initial NIHSS was 6. Last dose of Xarelto was on the morning . CT head negative. CTA neck showed proximal vertebral artery stenosis but no LVO. Hospital medicine was consulted for stroke workup     Subjective & 24hr Events:   Vital signs appropriate overnight last night.  Patient had some confusion very early morning this morning was difficult to reorient and received a dose of ziprasidone.  Likely related to hospital delirium.  Unfortunately his room has no window and is difficult to keep his circadian rhythm.    Adding low-dose haloperidol at night to assist with hospital related delirium    Assessment & Plan:   Acute metabolic encephalopathy-resolved  Strokelike symptoms-resolved  Vitamin B12 deficiency  - NIHSS 6 on admission, currently 0  - CT head and CTA negative for any evidence of acute stroke  - MRI was ordered but contraindicated secondary to titanium bonita and spine  - EEG was ordered, not recommended by neurology  - No evidence of infectious etiology  - TSH, T4, B12, folate and ammonia ordered, B12 noted to be deficient  - Had a period of permissive hypertension  - Coreg full dose to resume tonight 2025  - Continuing ezetimibe and Xarelto    Hospital related delirium  - Trial of low-dose haloperidol at night    Paroxysmal atrial fibrillation  History of DVT  Hypercoagulable state  - 
  Neurology Daily Progress Note     Assessment:     89-year-old male seen as a code S with reportedly abrupt onset language deficits and dysphagia. Initial imaging was unrevealing. He is unable to have MRI at this time. He is already on maximal medical therapy for stroke prevention with Xarelto and Zetia. An MRI would be unlikely to . It is possible this episode was related to recrudescence of old stroke symptoms or acute worsening of underlying dementia.     Plan:     Continue Xarelto and zetia. Patient has statin allergy    Rule out underlying infectious/metabolic causes of symptoms.     We will sign off.       Subjective:        Interval history:    Patient reports he is doing well. States dysphagia is resolved. Eating breakfast.     History:    Polo Smith is a 89 y.o. male who is being seen for dysphagia and AMS.    89-year-old male with atrial fibrillation on Xarelto who is being seen for code S. The patient was last known normal around 12:00 today when he reportedly had trouble swallowing his food and was confused.  History obtained per EMS.  Collateral not available at bedside.  This reportedly was preceded by a fall yesterday.  He takes Xarelto. The patient presented to [facility] ED.  A code S was called at 12:48 PM. Neurology arrived to the bedside at 12:51 PM. Initial NIHSS was 6     Review of systems negative with exception of pertinent positives and negatives noted above.       Objective:     Physical Exam:  General - Well developed, well nourished, in no apparent distress. Pleasant and conversant.   HEENT - Normocephalic, atraumatic. Conjunctiva are clear.   Neck - Supple without masses  Extremities - Peripheral pulses intact. No edema and no rashes.     Neurological examination - Awake, alert, able to follow commands. Comprehension is impaired. Attention is good. Formal memory not tested.   On cranial nerve examination, (II, III, IV, VI) pupils are equal, round, and reactive to light. 
  Physician Progress Note      PATIENT:               QUINCY UGARTE  CSN #:                  [id number]  :                       10/9/1934  ADMIT DATE:       2025 12:48 PM  DISCH DATE:  RESPONDING  PROVIDER #:        James Wilcox MD          QUERY TEXT:    Patient admitted with Aphasia, noted to have atrial fibrillation and is   maintained on Xarelto. If possible, please document in progress notes and   discharge summary if you are evaluating and/or treating any of the following:?  ?  The medical record reflects the following:  Risk Factors: 89 yr old, HLD, BPH  Clinical Indicators: Pt with AFib on Xarelto  Treatment: Lab monitoring, medication management  Options provided:  -- Secondary hypercoagulable state in a patient with atrial fibrillation  -- Other - I will add my own diagnosis  -- Disagree - Not applicable / Not valid  -- Disagree - Clinically unable to determine / Unknown  -- Refer to Clinical Documentation Reviewer    PROVIDER RESPONSE TEXT:    This patient has secondary hypercoagulable state in a patient with atrial   fibrillation.    Query created by: Sonali Barron on 2025 11:31 AM      Electronically signed by:  James Wilcox MD 2025 8:05 AM          
4 Eyes Skin Assessment     NAME:  Polo Smith  YOB: 1934  MEDICAL RECORD NUMBER:  [id number]    The patient is being assessed for  Admission    I agree that at least one RN has performed a thorough Head to Toe Skin Assessment on the patient. ALL assessment sites listed below have been assessed.      Areas assessed by both nurses:    Head, Face, Ears, Shoulders, Back, Chest, Arms, Elbows, Hands, Sacrum. Buttock, Coccyx, Ischium, and Legs. Feet and Heels        Does the Patient have a Wound? No noted wound(s)       Jese Prevention initiated by RN: No  Wound Care Orders initiated by RN: No    Pressure Injury (Stage 3,4, Unstageable, DTI, NWPT, and Complex wounds) if present, place Wound referral order by RN under : No    New Ostomies, if present place, Ostomy referral order under : No     Nurse 1 eSignature: Electronically signed by Freya Barnes RN on 1/22/25 at 4:47 AM EST    **SHARE this note so that the co-signing nurse can place an eSignature**    Nurse 2 eSignature: {Esignature:[id number]}   
ACUTE OCCUPATIONAL THERAPY GOALS:   (Developed with and agreed upon by patient and/or caregiver.)  (1.) Polo Smith  will complete lower body bathing and dressing with SUPERVISION and adaptive equipment as needed.    (2.) Polo Smith will complete toileting with SUPERVISION.  (3.) Polo Smith will tolerate 25 minutes of OT treatment with 1-2 rest breaks to increase activity tolerance for ADLs.   (4.) Ploo Smith will complete functional transfers with SUPERVISION and adaptive equipment as needed.   (5.) Polo Smith will complete functional ADL task standing at sink SUPERVISION and adaptive equipment as needed.     Timeframe: 7 visits      OCCUPATIONAL THERAPY: Daily Note AM   OT Visit Days: 2   Time In/Out  OT Charge Capture  Rehab Caseload Tracker  OT Orders    Polo Smith is a 89 y.o. male   PRIMARY DIAGNOSIS: Stroke-like symptoms  Aphasia [R47.01]  Speech disturbance, unspecified type [R47.9]       Inpatient: Payor: MEDICARE / Plan: MEDICARE PART A AND B / Product Type: *No Product type* /     ASSESSMENT:     REHAB RECOMMENDATIONS:   Recommendation to date pending progress:  Setting:  Short-term Rehab    Equipment:    To Be Determined     ASSESSMENT:  Mr. Smith is doing fair today. Pt presents supine and agreeable to therapy. Remains confused, only oriented to name. Pt overall min A with functional transfers and mobility of household distance with RW. Pt completed toileting and lower body dressing with assist. Pt seemed slightly more confused today, turned on all lights, left door open, and left pt sitting up in recliner chair. Educated nursing staff to do the same to prevent further delirium. Pt continues to have deficits in strength, balance, functional mobility, cognition, and activity tolerance. Slow progress this date. Continue OT POC. .       SUBJECTIVE:     Mr. Smith states, \"I need to go to the bathroom\"     Social/Functional Lives With: Spouse  Type of Home: House  Home Layout: 
ACUTE PHYSICAL THERAPY GOALS:   (Developed with and agreed upon by patient and/or caregiver.)   Mr. Smith will perform all transfers independently in 7 days.    Mr. Smith will perform gait with least restrictive device >250 ft independently in 7 days.    Mr. Smith will perform dynamic tasks in his room >25 min independently in 7 days.       PHYSICAL THERAPY Initial Assessment, Daily Note, and AM  (Link to Caseload Tracking: PT Visit Days : 1  Acknowledge Orders  Time In/Out  PT Charge Capture  Rehab Caseload Tracker    Polo Smith is a 89 y.o. male   PRIMARY DIAGNOSIS: Aphasia  Aphasia [R47.01]  Speech disturbance, unspecified type [R47.9]       Reason for Referral: Generalized Muscle Weakness (M62.81)  Difficulty in walking, Not elsewhere classified (R26.2)  Inpatient: Payor: MEDICARE / Plan: MEDICARE PART A AND B / Product Type: *No Product type* /     ASSESSMENT:     REHAB RECOMMENDATIONS:   Recommendation to date pending progress:  Setting:  Short-term Rehab    Equipment:    To Be Determined     ASSESSMENT:  Mr. Smith presents with confusion along with decreased mobility and decreased gait.  Unclear what his baseline is because he is aphasic at this time. He also has underlying dementia which does not help.   Per chart he fell and hit his head and then started having speech problems the next day.  There is no family present.    Supine to sit with cg.  Sitting balance is fair - tending to lean backwards.  Sit to stand with min assist also with posterior lean.  Gait with rolling walker and min assist around the room with posterior lean.    Mr. Smith is functioning below baseline presumably  and is therefore appropriate for skilled PT to maximize his rehab potential.  He will require a post acute rehab stay.     [facility] AM-PAC™ “6 Clicks” Basic Mobility Inpatient Short Form  AM-PAC Basic Mobility - Inpatient   How much help is needed turning from your back to your side while in a flat bed 
ACUTE PHYSICAL THERAPY GOALS:   (Developed with and agreed upon by patient and/or caregiver.)  Mr. Smith will perform all transfers independently in 7 days.    Mr. Smith will perform gait with least restrictive device >250 ft independently in 7 days.    Mr. Smith will perform dynamic tasks in his room >25 min independently in 7 days.     PHYSICAL THERAPY: Daily Note AM   (Link to Caseload Tracking: PT Visit Days : 2  Time In/Out PT Charge Capture  Rehab Caseload Tracker  Orders    Polo Smith is a 89 y.o. male   PRIMARY DIAGNOSIS: Stroke-like symptoms  Aphasia [R47.01]  Speech disturbance, unspecified type [R47.9]       Inpatient: Payor: MEDICARE / Plan: MEDICARE PART A AND B / Product Type: *No Product type* /     ASSESSMENT:     REHAB RECOMMENDATIONS:   Recommendation to date pending progress:  Setting:  Short-term Rehab    Equipment:    To Be Determined     ASSESSMENT:  Mr. Smith was in supine on arrival.  He continues to be confused.  No idea where he is or why he is here,  or even month or year.  Does know his name and will follow commands with some extra time and  repetition.  Supine to sit with min assist. Sit to stand with min assist  with rolling walker and gait 50 ft to brown bathroom.  He had had a BM and was soiled in his brief but he said he had to go again so felt it easier to do it all the bathroom since there are no BSC in the room. He figgits a lot with his hands.  Again not aware of what is going on.  He seems slightly more confused today than yesterday however he is in an ER room with no sunlight and the lights were off.  This is not a good place for him.  Mobility has improved today with distance.     SUBJECTIVE:   Mr. Smith states, \"I don't know\"     Social/Functional Lives With: Spouse  Type of Home: House  Home Layout: One level  Home Access: Stairs to enter without rails  Entrance Stairs - Number of Steps: 2  Bathroom Toilet: Standard  Bathroom Equipment: Commode  Bathroom 
ACUTE PHYSICAL THERAPY GOALS:   (Developed with and agreed upon by patient and/or caregiver.)  Mr. Smith will perform all transfers independently in 7 days.    Mr. Smith will perform gait with least restrictive device >250 ft independently in 7 days.    Mr. Smith will perform dynamic tasks in his room >25 min independently in 7 days.     PHYSICAL THERAPY: Daily Note AM   (Link to Caseload Tracking: PT Visit Days : 3  Time In/Out PT Charge Capture  Rehab Caseload Tracker  Orders    Polo Smith is a 89 y.o. male   PRIMARY DIAGNOSIS: Stroke-like symptoms  Aphasia [R47.01]  Speech disturbance, unspecified type [R47.9]       Inpatient: Payor: MEDICARE / Plan: MEDICARE PART A AND B / Product Type: *No Product type* /     ASSESSMENT:     REHAB RECOMMENDATIONS:   Recommendation to date pending progress:  Setting:  Short-term Rehab    Equipment:    To Be Determined     ASSESSMENT:  Mr. Smith was supine upon contact and agreeable to PT. Patient presents pleasantly confused with wife and son at bedside. Patient performed supine to sit and transfer to standing with min assist, additional time, and cues for technique. Once standing patient ambulated 120' with rolling walker, CGA-min assist for balance and cues for sequencing with rolling walker. Patient returned to EOB where he participated in prolonged static/dynamic seated balance activities while participating in functional tasks. Patient demonstrated good static sitting balance and fair dynamic sitting balance. Steady progress towards PT goals. Will continue PT efforts.     SUBJECTIVE:   Mr. Smith states, \"That's my son Tad\"     Social/Functional Lives With: Spouse  Type of Home: House  Home Layout: One level  Home Access: Stairs to enter without rails  Entrance Stairs - Number of Steps: 2  Bathroom Toilet: Standard  Bathroom Equipment: Commode  Bathroom Accessibility: Accessible  Home Equipment: None  Receives Help From: Family  Prior Level of Assist for 
ACUTE PHYSICAL THERAPY GOALS:   (Developed with and agreed upon by patient and/or caregiver.)  Mr. Smith will perform all transfers independently in 7 days.    Mr. Smith will perform gait with least restrictive device >250 ft independently in 7 days.    Mr. Smith will perform dynamic tasks in his room >25 min independently in 7 days.     PHYSICAL THERAPY: Daily Note PM   (Link to Caseload Tracking: PT Visit Days : 4  Time In/Out PT Charge Capture  Rehab Caseload Tracker  Orders    Polo Smith is a 89 y.o. male   PRIMARY DIAGNOSIS: Stroke-like symptoms  Aphasia [R47.01]  Speech disturbance, unspecified type [R47.9]       Inpatient: Payor: MEDICARE / Plan: MEDICARE PART A AND B / Product Type: *No Product type* /     ASSESSMENT:     REHAB RECOMMENDATIONS:   Recommendation to date pending progress:  Setting:  Short-term Rehab    Equipment:    To Be Determined     ASSESSMENT:  Mr. Smith was found supine in bed and agreeable to PT once awakened. Today, pt demonstrated bed mobility with modA, extra time, and cuing for bedrail use. Pt was noted to benefit from firm one step commands, as he remained pleasantly confused this session. Once EOB, pt was able to ambulate across 15' with a RW, but began to experience B knee buckling. A RT was nearby and brought a chair and pt was lowered to seated with maxA. Pt made minimal progress towards goals today, but will continue to be seen by PT in order to address remaining mobility impairments and return pt to PLOF. Pt left supine in bed with needs in reach and alarm activated.      SUBJECTIVE:   Mr. Smith states, \"Hey Gucci\"     Social/Functional Lives With: Spouse  Type of Home: House  Home Layout: One level  Home Access: Stairs to enter without rails  Entrance Stairs - Number of Steps: 2  Bathroom Toilet: Standard  Bathroom Equipment: Commode  Bathroom Accessibility: Accessible  Home Equipment: None  Receives Help From: Family  Prior Level of Assist for ADLs: 
Dr. Anderson said to hold off on MRI since the no family protocol would need to put into effect for this patient. We are going to cancel his order and if the hospitalist is willing to order the CXR and KUB and they would need to sign the screening form.   
EEG order placed on 1/22/2025.  Reached out to Neurology as department had signed off for services for this patient late this morning.  Per notes EEG was not indicated at that time.  Will hold off on this order unless there have been changes warranting this study.    Brenda Jackson,  EEG Tech   
GOALS:  LTG: Patient will maintain adequate hydration/nutrition with optimum safety and efficiency of swallowing function with PO intake without overt signs or symptoms of aspiration for the highest appropriate diet level.  STG:  Patient will consume minced and moist textures and thin liquids without overt signs or symptoms of airway compromise.  Patient will safely ingest diet trials during therapeutic feedings with SLP without overt signs or symptoms of respiratory compromise in efforts to advance diet.  Patient will complete a Modified Barium Swallow study to fully assess physiology and anatomy of the swallow and determine safest appropriate diet and/or rehabilitation strategies, as medically indicated.    LTG: Patient will participate in futher cognitive-linguistic assessment as clinically indicated.     SPEECH LANGUAGE PATHOLOGY: DYSPHAGIA Daily Note #1    Acknowledge Order  I  Therapy Time  I   Charges     I  Rehab Caseload Tracker    NAME: Polo Smith  : 10/9/1934  MRN: [id number]    ADMISSION DATE: 2025  PRIMARY DIAGNOSIS: Aphasia    ICD-10: Treatment Diagnosis: R13.11 Dysphagia, Oral Phase    RECOMMENDATIONS   Diet:    Minced and Moist  Thin Liquids    Medication: as tolerated   Compensatory Swallowing Strategies:   Slow rate of intake  Small bites/sips  Upright as possible for all oral intake  Assist feed/supervision   Check for pocketing    Therapeutic Intervention:   Patient/family education  Dysphagia treatment   Patient continues to require skilled intervention:  Yes. Recommend ongoing speech therapy services during this hospitalization.     Anticipated Discharge Needs: Ongoing speech therapy is recommended at next level of care.      ASSESSMENT    Patient presents with mild oral dysphagia characterized by prolonged mastication, likely exacerbated by altered mental status. Some pocketing/residual noted this date. Occasional throat clear noted with and without PO, otherwise no overt 
GOALS:  LTG: Patient will maintain adequate hydration/nutrition with optimum safety and efficiency of swallowing function with PO intake without overt signs or symptoms of aspiration for the highest appropriate diet level.  STG:  Patient will consume minced and moist textures and thin liquids without overt signs or symptoms of airway compromise.  Patient will safely ingest diet trials during therapeutic feedings with SLP without overt signs or symptoms of respiratory compromise in efforts to advance diet.  Patient will complete a Modified Barium Swallow study to fully assess physiology and anatomy of the swallow and determine safest appropriate diet and/or rehabilitation strategies, as medically indicated.    LTG: Patient will participate in futher cognitive-linguistic assessment as clinically indicated.     SPEECH LANGUAGE PATHOLOGY: DYSPHAGIA Initial Assessment    Acknowledge Order  I  Therapy Time  I   Charges     I  Rehab Caseload Tracker      NAME: Polo Smith  : 10/9/1934  MRN: [id number]    ADMISSION DATE: 2025  PRIMARY DIAGNOSIS: Aphasia    ICD-10: Treatment Diagnosis: R13.11 Dysphagia, Oral Phase    RECOMMENDATIONS   Diet:    Minced and Moist  Thin Liquids    Medication: as tolerated   Compensatory Swallowing Strategies:   Slow rate of intake  Small bites/sips  Upright as possible for all oral intake  Assist feed/supervision    Therapeutic Intervention:   Patient/family education  Dysphagia treatment   Patient continues to require skilled intervention:  Yes. Recommend ongoing speech therapy services during this hospitalization.     Anticipated Discharge Needs: Ongoing speech therapy is recommended at next level of care.      ASSESSMENT    Patient presents with mild oral dysphagia characterized by prolonged mastication, likely exacerbated by altered mental status. Patient with difficulty self feeding. Functional intake of soft solids/thin liquid, no overt indications of airway compromise with all 
Hourly rounds performed this shift. Bed lowered and locked. Call light within reach. All needs met at this time. Pt is in bed resting at this time. Telemetry reading Afib. Pt is pleasantly confused, easily reoriented. Family came to the bedside. Pt does not have an IV due to pulling it out. No IV meds/fluids ordered at this time. EEG on hold per neurology.    
Nutrition Assessment  Assessment Type: Reassess  Reason for visit:  General Nutrition Management/other reason (Hospitalists)  Malnutrition Screening Tool Score: 0    Nutrition Intervention:   Food and/or Nutrient Delivery:   Meals and Snacks:  Diet: Continue current diet per SLP evaluation  Medical Food Supplements:   Medical food supplement therapy:  Continue Ensure Plus High Protein (high calorie high protein) 350 calories, 20 grams protein per 8 ounce serving      Malnutrition Assessment:  Academy/A.S.P.E.N Clinical Malnutrition Criteria  Malnutrition Status: Moderate malnutrition  Context: Chronic Illness  Findings of clinical characteristics of malnutrition:   Energy Intake:  Unable to assess (Son report poor intake pta but unsure how long)  Weight Loss:  Unable to assess     Body Fat Loss:  Mild body fat loss Triceps, Buccal region   Muscle Mass Loss:  Mild muscle mass loss Temples (temporalis), Clavicles (pectoralis & deltoids)  Fluid Accumulation:  Unable to assess     Strength:  Not Performed    Nutrition Assessment:  Food/Nutrition Related History: Pt is confused at visit. Son stated pts wife cares for him at home. He reports thy prepare mostly microwave meals. Son stated he does not think pt was eating well pta. He stated pt usually consumes 2 premier protein shakes each day.      Do You Have Any Cultural, Pentecostalism, or Ethnic Food Preferences?: No   Weight History: Pt reports he thinks he has lost wt. He stated he had to tighten his belt. Son endorses wt loss. Limited EMR wt hx review 1/23/2024 176lb (FM).   Nutrition Background:       PMH significant for afib, HLD, and BPH. Pt admitted with acute metabolic encephalopathy, stroke like symptoms, and vitamin B12 deficiency.   Nutrition Monitoring/Evaluation:  Pt seen asleep in bed with no family present. Pt briefly woke to name however he did not answer questions. RD observed lunch tray untouched. RD observed 5 unopened Ensures at bedside. Discussed 
TRANSFER - IN REPORT:    Verbal report received from CHRISSY Lira on Polo Smith  being received from ED for routine progression of patient care      Report consisted of patient's Situation, Background, Assessment and   Recommendations(SBAR).     Information from the following report(s) Nurse Handoff Report, ED SBAR, and Neuro Assessment was reviewed with the receiving nurse.    Opportunity for questions and clarification was provided.      Assessment completed upon patient's arrival to unit and care assumed.    
TRANSFER - OUT REPORT:    Verbal report given to Anna diana Smith  being transferred to [facility] for routine progression of patient care       Report consisted of patient's Situation, Background, Assessment and   Recommendations(SBAR).     Information from the following report(s) Nurse Handoff Report was reviewed with the receiving nurse.           Lines:       Opportunity for questions and clarification was provided.      Patient transported with:  Tech        
Dysphagia: Intermittent supervision/cuing. One-two diet    consistencies restricted.   [] 3 Moderate Dysphagia: Total assistance, supervision, or strategies.       Two or more diet consistencies restricted.   [] 2 Moderate-Severe Dysphagia: Maximum assistance or maximum use     of strategies with partial po intake   [] 1 Severe dysphagia- NPO. Unable to tolerate any po safely     PLAN    Duration/Frequency: Continue to follow patient 3x/week for duration of hospitalization and/or until goals met    Rehabilitation Potential For Stated Goals: Good    Interdisciplinary Collaboration: RN/ PCT    Medical Necessity    Skilled intervention continues to be required due to patient still consuming a modified diet and medical complications.    Education:   Patient and/or family/caregiver educated on Results of evaluation, Role of speech therapy, Diet recommendations, SLP recommendations, and SLP plan  Education response: Verbalizes understanding and Needs reinforcement    Safety:   Call light within reach  In Bed  RN at bedside     Therapy Time:  Time In: 0922  Time Out: 0933  Minutes: 11    DYLLAN CALDERON  1/27/2025 10:27 AM    
the cough occurring, and following stated it \"got past him\". Pt states this does occur with straws and he states he typically avoids them. Pt consumed puree x5, softened solid (cracker submerged in puree) x4, and coarse solid x2. Spontaneously requested liquid wash, alternating bites and sips during trials. Large bite size with puree. Pt able to self present liquids, puree, and handheld solid, though does require assistance for feeding and safety (pacing, bite/sip size, checking oral cavity). Oral cavity clear, no residue following each trial and at the end of PO trials. No further overt signs or symptoms of airway compromise.   Further PO trials terminated, as attending provider entered to discuss with pt/family.     Cognitive-linguistic:  Formal assessment of cognition unable to be completed today d/t session terminated early. Pt oriented to self only. Patient acknowledged his wife being present in the room, but unable to state wife's name. Stated they have been  about 70 years.      Dysphagia Outcome and Severity Scale (ELINOR)  Score: 5 Description   [] 7 Normal in all situations   [] 6 Within Functional Limits/ Modified independent   [x] 5 Mild Dysphagia: Distant Supervision. May need one diet consistency      restricted.   [] 4 Mild-Moderate Dysphagia: Intermittent supervision/cuing. One-two diet    consistencies restricted.   [] 3 Moderate Dysphagia: Total assistance, supervision, or strategies.       Two or more diet consistencies restricted.   [] 2 Moderate-Severe Dysphagia: Maximum assistance or maximum use     of strategies with partial po intake   [] 1 Severe dysphagia- NPO. Unable to tolerate any po safely     PLAN    Duration/Frequency: Continue to follow patient 3x/week for duration of hospitalization and/or until goals met    Rehabilitation Potential For Stated Goals: Good    Interdisciplinary Collaboration: RN/ PCT    Medical Necessity    Skilled intervention continues to be required due to 
place, and time.   Psychiatric:         Mood and Affect: Mood normal.         Thought Content: Thought content normal.          I have personally reviewed labs and tests:  Recent Labs:  Recent Results (from the past 48 hour(s))   POCT Glucose    Collection Time: 01/25/25  5:22 PM   Result Value Ref Range    POC Glucose 127 (H) 65 - 100 mg/dL    Performed by: Jayant    POCT Glucose    Collection Time: 01/25/25  7:28 PM   Result Value Ref Range    POC Glucose 179 (H) 65 - 100 mg/dL    Performed by: Zeina    POCT Glucose    Collection Time: 01/26/25  5:55 AM   Result Value Ref Range    POC Glucose 137 (H) 65 - 100 mg/dL    Performed by: [employer]    Basic Metabolic Panel w/ Reflex to MG    Collection Time: 01/26/25  6:20 AM   Result Value Ref Range    Sodium 142 136 - 145 mmol/L    Potassium 3.5 3.5 - 5.1 mmol/L    Chloride 102 98 - 107 mmol/L    CO2 22 20 - 29 mmol/L    Anion Gap 18 (H) 7 - 16 mmol/L    Glucose 138 (H) 70 - 99 mg/dL    BUN 31 (H) 8 - 23 MG/DL    Creatinine 1.27 0.80 - 1.30 MG/DL    Est, Glom Filt Rate 54 (L) >60 ml/min/1.73m2    Calcium 10.2 8.8 - 10.2 MG/DL   CBC    Collection Time: 01/26/25  6:20 AM   Result Value Ref Range    WBC 6.2 4.3 - 11.1 K/uL    RBC 5.56 4.23 - 5.6 M/uL    Hemoglobin 15.6 13.6 - 17.2 g/dL    Hematocrit 48.4 41.1 - 50.3 %    MCV 87.1 82 - 102 FL    MCH 28.1 26.1 - 32.9 PG    MCHC 32.2 31.4 - 35.0 g/dL    RDW 13.4 11.9 - 14.6 %    Platelets 185 150 - 450 K/uL    MPV 9.9 9.4 - 12.3 FL    nRBC 0.00 0.0 - 0.2 K/uL   Magnesium    Collection Time: 01/26/25  6:20 AM   Result Value Ref Range    Magnesium 2.2 1.8 - 2.4 mg/dL   POCT Glucose    Collection Time: 01/26/25 11:20 AM   Result Value Ref Range    POC Glucose 148 (H) 65 - 100 mg/dL    Performed by: Jimi    POCT Glucose    Collection Time: 01/26/25  4:29 PM   Result Value Ref Range    POC Glucose 140 (H) 65 - 100 mg/dL    Performed by: JuanchoRKADEN    POCT Glucose    Collection Time: 01/26/25  9:34 
Hematocrit 48.8 41.1 - 50.3 %    MCV 87.0 82 - 102 FL    MCH 28.0 26.1 - 32.9 PG    MCHC 32.2 31.4 - 35.0 g/dL    RDW 13.4 11.9 - 14.6 %    Platelets 202 150 - 450 K/uL    MPV 9.7 9.4 - 12.3 FL    nRBC 0.00 0.0 - 0.2 K/uL   Magnesium    Collection Time: 01/23/25  6:08 AM   Result Value Ref Range    Magnesium 2.2 1.8 - 2.4 mg/dL   POCT Glucose    Collection Time: 01/23/25  6:53 AM   Result Value Ref Range    POC Glucose 116 (H) 65 - 100 mg/dL    Performed by: Jeannette    POCT Glucose    Collection Time: 01/23/25 11:56 AM   Result Value Ref Range    POC Glucose 132 (H) 65 - 100 mg/dL    Performed by: Ruby    POCT Glucose    Collection Time: 01/23/25  5:08 PM   Result Value Ref Range    POC Glucose 108 (H) 65 - 100 mg/dL    Performed by: Aye    POCT Glucose    Collection Time: 01/23/25  9:01 PM   Result Value Ref Range    POC Glucose 121 (H) 65 - 100 mg/dL    Performed by: Jeannette    POCT Glucose    Collection Time: 01/24/25  6:00 AM   Result Value Ref Range    POC Glucose 97 65 - 100 mg/dL    Performed by: Jeannette        Recent Labs     01/21/25  1817   COVID19 Not detected       Current Meds:  Current Facility-Administered Medications   Medication Dose Route Frequency    vitamin B-12 (CYANOCOBALAMIN) tablet 1,000 mcg  1,000 mcg Oral Daily    carvedilol (COREG) tablet 6.25 mg  6.25 mg Oral BID WC    allopurinol (ZYLOPRIM) tablet 100 mg  100 mg Oral Daily    ezetimibe (ZETIA) tablet 10 mg  10 mg Oral Daily    finasteride (PROSCAR) tablet 5 mg  5 mg Oral Daily    losartan (COZAAR) tablet 100 mg  100 mg Oral Daily    rivaroxaban (XARELTO) tablet 15 mg  15 mg Oral Daily    insulin lispro (HUMALOG,ADMELOG) injection vial 0-4 Units  0-4 Units SubCUTAneous 4x Daily AC & HS    glucose chewable tablet 16 g  4 tablet Oral PRN    dextrose bolus 10% 125 mL  125 mL IntraVENous PRN    Or    dextrose bolus 10% 250 mL  250 mL IntraVENous PRN    Glucagon Emergency KIT 1 mg  1 mg

## 2025-01-28 NOTE — CARE COORDINATION
Discharge planning was discussed with the attending MD and nursing staff.    The [facility] liaison confirmed the patient can admit today to room 221A.    RN CM called the patient's wife Jo at [phone] but was unable to reach her by telephone. RN CM called the patient's son Tad at [phone] and discussed discharge planning. He confirmed he is in agreement for the patient to discharge to [facility] today. RN CM discussed the [facility] Do Not Resuscitate Order form. He stated he will visit the patient today prior to discharge and will review the form at that time. RN CM informed him that signing the form is voluntary. He verbalized understanding of the information.

## 2025-01-28 NOTE — CARE COORDINATION
The patient has discharge orders. Medical transport was arranged for today at 1300. The nursing staff and [facility] liaison were notified of the anticipated transport time. No visitors are present at the bedside.

## 2025-01-28 NOTE — CARE COORDINATION
The attending MD signed the transport DNR. The original was placed on the discharge packet and a copy was placed in the patient's chart.

## 2025-01-28 NOTE — PLAN OF CARE
Problem: Chronic Conditions and Co-morbidities  Goal: Patient's chronic conditions and co-morbidity symptoms are monitored and maintained or improved  1/27/2025 2134 by Laura Olsen RN  Outcome: Progressing  Flowsheets (Taken 1/27/2025 1940)  Care Plan - Patient's Chronic Conditions and Co-Morbidity Symptoms are Monitored and Maintained or Improved: Monitor and assess patient's chronic conditions and comorbid symptoms for stability, deterioration, or improvement  1/27/2025 0803 by Patricia Calzada RN  Outcome: Progressing     Problem: Discharge Planning  Goal: Discharge to home or other facility with appropriate resources  1/27/2025 2134 by Laura Olsen RN  Outcome: Progressing  Flowsheets (Taken 1/27/2025 1940)  Discharge to home or other facility with appropriate resources:   Identify barriers to discharge with patient and caregiver   Arrange for needed discharge resources and transportation as appropriate   Identify discharge learning needs (meds, wound care, etc)  1/27/2025 0803 by Patricia Calzada RN  Outcome: Progressing     Problem: Safety - Adult  Goal: Free from fall injury  1/27/2025 2134 by Laura Olsen RN  Outcome: Progressing  1/27/2025 0803 by Patricia Calzada RN  Outcome: Progressing     Problem: Skin/Tissue Integrity  Goal: Absence of new skin breakdown  Description: 1.  Monitor for areas of redness and/or skin breakdown  2.  Assess vascular access sites hourly  3.  Every 4-6 hours minimum:  Change oxygen saturation probe site  4.  Every 4-6 hours:  If on nasal continuous positive airway pressure, respiratory therapy assess nares and determine need for appliance change or resting period.  1/27/2025 2134 by Laura Olsen RN  Outcome: Progressing  1/27/2025 0803 by Patricia Calzada RN  Outcome: Progressing     Problem: ABCDS Injury Assessment  Goal: Absence of physical injury  1/27/2025 2134 by Laura Olsen RN  Outcome: Progressing  1/27/2025 0803 by Patricia Calzada RN  Outcome: Progressing

## 2025-01-28 NOTE — DISCHARGE SUMMARY
Hospitalist Discharge Summary   Admit Date:  2025 12:48 PM   DC Note date: 2025  Name:  Polo Smith   Age:  89 y.o.  Sex:  male  :  10/9/1934   MRN:  [id number]   Room:  [place] [address]  PCP:  Basilio Lopez MD    Presenting Complaint: Aphasia     Initial Admission Diagnosis: Aphasia [R47.01]  Speech disturbance, unspecified type [R47.9]     Problem List for this Hospitalization (present on admission):    Principal Problem:    Stroke-like symptoms  Active Problems:    Chronic diastolic heart failure (HCC)    Essential hypertension    Diabetes mellitus type 2, controlled (HCC)    Hyperlipidemia    BPH (benign prostatic hyperplasia)    DVT (deep venous thrombosis) (HCC)    Aphasia    Palliative care encounter    Moderate protein-calorie malnutrition (HCC)    Vitamin B12 deficiency    Acute metabolic encephalopathy    Hypercoagulable state (HCC)  Resolved Problems:    * No resolved hospital problems. *      Hospital Course:  89-year-old male past medical history significant for A-fib on Xarelto, hyperlipidemia, and BPH who presented secondary to dysphagia and confusion.  Patient had Code S called and seen by neurology team.  Patient had CT head which was negative, CTA neck showing proximal vertebral artery stenosis but no LVO.  Patient was plan for MRI but contraindicated given titanium bonita in spine.  Patient was seen by neurology team with no further recommendations.  Patient seen by PT and OT who are recommending rehab and patient plan to be discharged to rehab at this time.  Patient to be on minced and moist with thin liquids per speech therapy recommendation.  Plan discussed with family over the phone who are in agreement.    Disposition: SNF Rehab.    Patient has no signs or symptoms of active tuberculosis.  Diet: ADULT DIET; Dysphagia - Minced and Moist  ADULT ORAL NUTRITION SUPPLEMENT; Breakfast, Lunch, Dinner; Standard High Calorie/High Protein Oral Supplement  Code Status: DNR    Follow

## 2025-01-28 NOTE — CARE COORDINATION
01/28/25 1118   Service Assessment   Patient Orientation Person   History Provided By Child/Family   Support Systems Spouse/Significant Other;Children   Patient's Healthcare Decision Maker is: Named in Scanned ACP Document   Current Functional Level Assistance with the following:   Can patient return to prior living arrangement No   Financial Resources Medicare   Community Resources None   Social/Functional History   Lives With Spouse   Type of Home House   Home Layout One level   Home Access Stairs to enter without rails   Entrance Stairs - Number of Steps 2   Bathroom Toilet Standard   Bathroom Equipment Commode   Bathroom Accessibility Accessible   Home Equipment None   Receives Help From Family   Prior Level of Assist for ADLs Independent   Prior Level of Assist for Homemaking Independent   Ambulation Assistance Independent   Prior Level of Assist for Transfers Independent   Active  No   Mode of Transportation Family   Education Family   Occupation Retired   Discharge Planning   Type of Residence Skilled Nursing Facility   Living Arrangements Spouse/Significant Other   Potential Assistance Needed Skilled Nursing Facility   DME Ordered? No   Potential Assistance Purchasing Medications No   Patient expects to be discharged to: Skilled nursing facility   Services At/After Discharge   Transition of Care Consult (CM Consult) Discharge Planning   Services At/After Discharge Skilled Nursing Facility (SNF)   Mode of Transport at Discharge BLS   Condition of Participation: Discharge Planning   The Plan for Transition of Care is related to the following treatment goals:  --    Name of the Patient Representative who was provided with the Choice of Provider and agrees with the Discharge Plan?  son   The Patient and/Or Patient Representative agree with the Discharge Plan? Yes   Freedom of Choice list was provided with basic dialogue that supports the patient's individualized plan of care/goals, treatment

## 2025-01-30 ENCOUNTER — APPOINTMENT (OUTPATIENT)
Dept: CT IMAGING | Age: 89
DRG: 064 | End: 2025-01-30
Payer: MEDICARE

## 2025-01-30 ENCOUNTER — APPOINTMENT (OUTPATIENT)
Dept: GENERAL RADIOLOGY | Age: 89
DRG: 064 | End: 2025-01-30
Payer: MEDICARE

## 2025-01-30 ENCOUNTER — HOSPITAL ENCOUNTER (INPATIENT)
Age: 89
LOS: 7 days | Discharge: NURSING, REHAB OR LONG-TERM CARE | DRG: 064 | End: 2025-02-06
Admitting: FAMILY MEDICINE
Payer: MEDICARE

## 2025-01-30 DIAGNOSIS — Z79.01 ON CONTINUOUS ORAL ANTICOAGULATION: ICD-10-CM

## 2025-01-30 DIAGNOSIS — R41.82 ALTERED MENTAL STATUS, UNSPECIFIED ALTERED MENTAL STATUS TYPE: ICD-10-CM

## 2025-01-30 DIAGNOSIS — I62.9 SPONTANEOUS INTRACRANIAL HEMORRHAGE (HCC): Primary | ICD-10-CM

## 2025-01-30 PROBLEM — I61.9 INTRACEREBRAL HEMORRHAGE, NONTRAUMATIC (HCC): Status: ACTIVE | Noted: 2025-01-30

## 2025-01-30 PROBLEM — I48.20 ATRIAL FIBRILLATION, CHRONIC (HCC): Status: ACTIVE | Noted: 2025-01-30

## 2025-01-30 LAB
ANION GAP SERPL CALC-SCNC: 15 MMOL/L (ref 7–16)
BASOPHILS # BLD: 0.02 K/UL (ref 0–0.2)
BASOPHILS NFR BLD: 0.3 % (ref 0–2)
BUN SERPL-MCNC: 49 MG/DL (ref 8–23)
CALCIUM SERPL-MCNC: 9.7 MG/DL (ref 8.8–10.2)
CHLORIDE SERPL-SCNC: 100 MMOL/L (ref 98–107)
CO2 SERPL-SCNC: 24 MMOL/L (ref 20–29)
CREAT SERPL-MCNC: 1.67 MG/DL (ref 0.8–1.3)
DIFFERENTIAL METHOD BLD: ABNORMAL
EOSINOPHIL # BLD: 0.03 K/UL (ref 0–0.8)
EOSINOPHIL NFR BLD: 0.5 % (ref 0.5–7.8)
ERYTHROCYTE [DISTWIDTH] IN BLOOD BY AUTOMATED COUNT: 14.1 % (ref 11.9–14.6)
FLUAV RNA SPEC QL NAA+PROBE: DETECTED
FLUBV RNA SPEC QL NAA+PROBE: NOT DETECTED
GLUCOSE BLD STRIP.AUTO-MCNC: 103 MG/DL (ref 65–100)
GLUCOSE SERPL-MCNC: 131 MG/DL (ref 70–99)
HCT VFR BLD AUTO: 44.9 % (ref 41.1–50.3)
HGB BLD-MCNC: 15 G/DL (ref 13.6–17.2)
IMM GRANULOCYTES # BLD AUTO: 0.04 K/UL (ref 0–0.5)
IMM GRANULOCYTES NFR BLD AUTO: 0.7 % (ref 0–5)
LYMPHOCYTES # BLD: 0.9 K/UL (ref 0.5–4.6)
LYMPHOCYTES NFR BLD: 15 % (ref 13–44)
MCH RBC QN AUTO: 28 PG (ref 26.1–32.9)
MCHC RBC AUTO-ENTMCNC: 33.4 G/DL (ref 31.4–35)
MCV RBC AUTO: 83.8 FL (ref 82–102)
MONOCYTES # BLD: 0.94 K/UL (ref 0.1–1.3)
MONOCYTES NFR BLD: 15.7 % (ref 4–12)
NEUTS SEG # BLD: 4.07 K/UL (ref 1.7–8.2)
NEUTS SEG NFR BLD: 67.8 % (ref 43–78)
NRBC # BLD: 0 K/UL (ref 0–0.2)
NT PRO BNP: 2530 PG/ML (ref 0–450)
PLATELET # BLD AUTO: 175 K/UL (ref 150–450)
PMV BLD AUTO: 10.6 FL (ref 9.4–12.3)
POTASSIUM SERPL-SCNC: 3.9 MMOL/L (ref 3.5–5.1)
RBC # BLD AUTO: 5.36 M/UL (ref 4.23–5.6)
SARS-COV-2 RDRP RESP QL NAA+PROBE: NOT DETECTED
SERVICE CMNT-IMP: ABNORMAL
SODIUM SERPL-SCNC: 139 MMOL/L (ref 136–145)
SOURCE: NORMAL
TROPONIN T SERPL HS-MCNC: 38 NG/L (ref 0–22)
TSH, 3RD GENERATION: 1.46 UIU/ML (ref 0.27–4.2)
WBC # BLD AUTO: 6 K/UL (ref 4.3–11.1)

## 2025-01-30 PROCEDURE — 84484 ASSAY OF TROPONIN QUANT: CPT

## 2025-01-30 PROCEDURE — 82962 GLUCOSE BLOOD TEST: CPT

## 2025-01-30 PROCEDURE — 87635 SARS-COV-2 COVID-19 AMP PRB: CPT

## 2025-01-30 PROCEDURE — 99285 EMERGENCY DEPT VISIT HI MDM: CPT

## 2025-01-30 PROCEDURE — 93005 ELECTROCARDIOGRAM TRACING: CPT

## 2025-01-30 PROCEDURE — 71045 X-RAY EXAM CHEST 1 VIEW: CPT

## 2025-01-30 PROCEDURE — 96365 THER/PROPH/DIAG IV INF INIT: CPT

## 2025-01-30 PROCEDURE — 2500000003 HC RX 250 WO HCPCS: Performed by: FAMILY MEDICINE

## 2025-01-30 PROCEDURE — 70450 CT HEAD/BRAIN W/O DYE: CPT

## 2025-01-30 PROCEDURE — 2580000003 HC RX 258

## 2025-01-30 PROCEDURE — 6370000000 HC RX 637 (ALT 250 FOR IP): Performed by: FAMILY MEDICINE

## 2025-01-30 PROCEDURE — 85025 COMPLETE CBC W/AUTO DIFF WBC: CPT

## 2025-01-30 PROCEDURE — 2580000003 HC RX 258: Performed by: FAMILY MEDICINE

## 2025-01-30 PROCEDURE — 80048 BASIC METABOLIC PNL TOTAL CA: CPT

## 2025-01-30 PROCEDURE — 6360000002 HC RX W HCPCS: Performed by: FAMILY MEDICINE

## 2025-01-30 PROCEDURE — 83880 ASSAY OF NATRIURETIC PEPTIDE: CPT

## 2025-01-30 PROCEDURE — 2000000000 HC ICU R&B

## 2025-01-30 PROCEDURE — 6360000002 HC RX W HCPCS

## 2025-01-30 PROCEDURE — 87502 INFLUENZA DNA AMP PROBE: CPT

## 2025-01-30 PROCEDURE — 84443 ASSAY THYROID STIM HORMONE: CPT

## 2025-01-30 RX ORDER — INSULIN LISPRO 100 [IU]/ML
0-4 INJECTION, SOLUTION INTRAVENOUS; SUBCUTANEOUS
Status: DISCONTINUED | OUTPATIENT
Start: 2025-01-30 | End: 2025-02-06 | Stop reason: HOSPADM

## 2025-01-30 RX ORDER — SODIUM CHLORIDE 9 MG/ML
50 INJECTION, SOLUTION INTRAVENOUS ONCE
Status: COMPLETED | OUTPATIENT
Start: 2025-01-30 | End: 2025-01-30

## 2025-01-30 RX ORDER — FINASTERIDE 5 MG/1
5 TABLET, FILM COATED ORAL DAILY
Status: DISCONTINUED | OUTPATIENT
Start: 2025-01-31 | End: 2025-02-06 | Stop reason: HOSPADM

## 2025-01-30 RX ORDER — OSELTAMIVIR PHOSPHATE 75 MG/1
75 CAPSULE ORAL ONCE
Status: COMPLETED | OUTPATIENT
Start: 2025-01-30 | End: 2025-01-30

## 2025-01-30 RX ORDER — SODIUM CHLORIDE 9 MG/ML
INJECTION, SOLUTION INTRAVENOUS CONTINUOUS
Status: ACTIVE | OUTPATIENT
Start: 2025-01-30 | End: 2025-01-31

## 2025-01-30 RX ORDER — ACETAMINOPHEN 325 MG/1
650 TABLET ORAL EVERY 6 HOURS PRN
Status: DISCONTINUED | OUTPATIENT
Start: 2025-01-30 | End: 2025-02-06 | Stop reason: HOSPADM

## 2025-01-30 RX ORDER — DEXAMETHASONE SODIUM PHOSPHATE 10 MG/ML
10 INJECTION INTRAMUSCULAR; INTRAVENOUS ONCE
Status: DISCONTINUED | OUTPATIENT
Start: 2025-01-30 | End: 2025-01-30

## 2025-01-30 RX ORDER — IBUPROFEN 600 MG/1
1 TABLET ORAL PRN
Status: DISCONTINUED | OUTPATIENT
Start: 2025-01-30 | End: 2025-02-06 | Stop reason: HOSPADM

## 2025-01-30 RX ORDER — ALBUTEROL SULFATE 5 MG/ML
2.5 SOLUTION RESPIRATORY (INHALATION) EVERY 6 HOURS PRN
Status: DISCONTINUED | OUTPATIENT
Start: 2025-01-30 | End: 2025-02-06 | Stop reason: HOSPADM

## 2025-01-30 RX ORDER — CARVEDILOL 25 MG/1
25 TABLET ORAL 2 TIMES DAILY WITH MEALS
Status: DISCONTINUED | OUTPATIENT
Start: 2025-01-31 | End: 2025-02-01

## 2025-01-30 RX ORDER — INSULIN LISPRO 100 [IU]/ML
0-8 INJECTION, SOLUTION INTRAVENOUS; SUBCUTANEOUS
Status: DISCONTINUED | OUTPATIENT
Start: 2025-01-30 | End: 2025-01-30

## 2025-01-30 RX ORDER — ONDANSETRON 4 MG/1
4 TABLET, ORALLY DISINTEGRATING ORAL EVERY 8 HOURS PRN
Status: DISCONTINUED | OUTPATIENT
Start: 2025-01-30 | End: 2025-02-06 | Stop reason: HOSPADM

## 2025-01-30 RX ORDER — FAMOTIDINE 20 MG/1
10 TABLET, FILM COATED ORAL 2 TIMES DAILY PRN
Status: DISCONTINUED | OUTPATIENT
Start: 2025-01-30 | End: 2025-02-06 | Stop reason: HOSPADM

## 2025-01-30 RX ORDER — SODIUM CHLORIDE 0.9 % (FLUSH) 0.9 %
5-40 SYRINGE (ML) INJECTION PRN
Status: DISCONTINUED | OUTPATIENT
Start: 2025-01-30 | End: 2025-02-06 | Stop reason: HOSPADM

## 2025-01-30 RX ORDER — LEVETIRACETAM 500 MG/5ML
20 INJECTION, SOLUTION, CONCENTRATE INTRAVENOUS ONCE
Status: COMPLETED | OUTPATIENT
Start: 2025-01-30 | End: 2025-01-30

## 2025-01-30 RX ORDER — OSELTAMIVIR PHOSPHATE 30 MG/1
30 CAPSULE ORAL 2 TIMES DAILY
Status: COMPLETED | OUTPATIENT
Start: 2025-01-31 | End: 2025-02-03

## 2025-01-30 RX ORDER — MAGNESIUM HYDROXIDE/ALUMINUM HYDROXICE/SIMETHICONE 120; 1200; 1200 MG/30ML; MG/30ML; MG/30ML
30 SUSPENSION ORAL EVERY 6 HOURS PRN
Status: DISCONTINUED | OUTPATIENT
Start: 2025-01-30 | End: 2025-02-06 | Stop reason: HOSPADM

## 2025-01-30 RX ORDER — DEXTROSE MONOHYDRATE 100 MG/ML
INJECTION, SOLUTION INTRAVENOUS CONTINUOUS PRN
Status: DISCONTINUED | OUTPATIENT
Start: 2025-01-30 | End: 2025-02-06 | Stop reason: HOSPADM

## 2025-01-30 RX ORDER — EZETIMIBE 10 MG/1
10 TABLET ORAL DAILY
Status: DISCONTINUED | OUTPATIENT
Start: 2025-01-31 | End: 2025-02-06 | Stop reason: HOSPADM

## 2025-01-30 RX ORDER — LEVETIRACETAM 500 MG/5ML
500 INJECTION, SOLUTION, CONCENTRATE INTRAVENOUS EVERY 12 HOURS
Status: DISCONTINUED | OUTPATIENT
Start: 2025-01-31 | End: 2025-02-06 | Stop reason: HOSPADM

## 2025-01-30 RX ORDER — SODIUM CHLORIDE 0.9 % (FLUSH) 0.9 %
5-40 SYRINGE (ML) INJECTION EVERY 12 HOURS SCHEDULED
Status: DISCONTINUED | OUTPATIENT
Start: 2025-01-30 | End: 2025-02-06 | Stop reason: HOSPADM

## 2025-01-30 RX ORDER — ONDANSETRON 2 MG/ML
4 INJECTION INTRAMUSCULAR; INTRAVENOUS EVERY 6 HOURS PRN
Status: DISCONTINUED | OUTPATIENT
Start: 2025-01-30 | End: 2025-02-06 | Stop reason: HOSPADM

## 2025-01-30 RX ORDER — ACETAMINOPHEN 650 MG/1
650 SUPPOSITORY RECTAL EVERY 6 HOURS PRN
Status: DISCONTINUED | OUTPATIENT
Start: 2025-01-30 | End: 2025-02-06 | Stop reason: HOSPADM

## 2025-01-30 RX ORDER — SODIUM CHLORIDE 9 MG/ML
INJECTION, SOLUTION INTRAVENOUS PRN
Status: DISCONTINUED | OUTPATIENT
Start: 2025-01-30 | End: 2025-02-06 | Stop reason: HOSPADM

## 2025-01-30 RX ADMIN — SODIUM CHLORIDE, PRESERVATIVE FREE 10 ML: 5 INJECTION INTRAVENOUS at 21:24

## 2025-01-30 RX ADMIN — SODIUM CHLORIDE: 9 INJECTION, SOLUTION INTRAVENOUS at 21:28

## 2025-01-30 RX ADMIN — LEVETIRACETAM 1470 MG: 100 INJECTION INTRAVENOUS at 18:53

## 2025-01-30 RX ADMIN — PROTHROMBIN COMPLEX CONCENTRATE (HUMAN) 2000 UNITS: 25.5; 16.5; 24; 22; 22; 26 POWDER, FOR SOLUTION INTRAVENOUS at 16:00

## 2025-01-30 RX ADMIN — SODIUM CHLORIDE 5 MG/HR: 9 INJECTION, SOLUTION INTRAVENOUS at 19:00

## 2025-01-30 RX ADMIN — SODIUM CHLORIDE 50 ML: 9 INJECTION, SOLUTION INTRAVENOUS at 16:01

## 2025-01-30 RX ADMIN — OSELTAMIVIR PHOSPHATE 75 MG: 75 CAPSULE ORAL at 18:55

## 2025-01-30 RX ADMIN — SODIUM CHLORIDE: 9 INJECTION, SOLUTION INTRAVENOUS at 18:53

## 2025-01-30 ASSESSMENT — PAIN SCALES - GENERAL
PAINLEVEL_OUTOF10: 0

## 2025-01-30 ASSESSMENT — PAIN - FUNCTIONAL ASSESSMENT: PAIN_FUNCTIONAL_ASSESSMENT: 0-10

## 2025-01-30 NOTE — ED TRIAGE NOTES
Pt 89 y/o male arrives via [facility] ems from [facility] with a complaint of malaise, pt is reported to be more sleepy then normal per the nurse and pt family. Pt was seen a month ago for the same issue and was evaluated and nothing was found at that time.

## 2025-01-30 NOTE — ED PROVIDER NOTES
Emergency Department Provider Note       PCP: Renzo Gomez MD   Age: 89 y.o.   Sex: male     DISPOSITION Admitted 01/30/2025 05:40:59 PM    ICD-10-CM    1. Spontaneous intracranial hemorrhage (HCC)  I62.9       2. Altered mental status, unspecified altered mental status type  R41.82       3. On continuous oral anticoagulation  Z79.01           Medical Decision Making     Differential diagnosis includes closed head injury, intraparenchymal bleed, subdural hematoma, epidural hematoma, electrolyte [place], UTI, partial list.    Patient presents for altered mental status from the nursing home.  History and physical exam are limited secondary to dementia.  I do not think he see any focal neurologic deficits seen on my exam.    Laboratory show elevated BNP at 2500, troponin of 38, we do not have any prior results to compare to although the rest of his physical exam does not show any obvious fluid overload, I do not think this is contributing to his altered mental status.  He did test positive for influenza A.    Lab work does show mildly elevated creatinine at 1.67, it appears his baseline is around 1.2, I do not believe this represents an ANGEL.    CT imaging does show concerning findings of intraparenchymal bleed measuring 2 cm in the left frontal lobe with vasogenic edema and local regional mass effect.    I immediately consulted neurosurgery and reversed patient's with Xarelto with PCC for intraparenchymal bleed.  They do not have any recommendations of steroids at this time but will admit and repeat CT imaging at 6 and 12 hours     1 or more acute illnesses that pose a threat to life or bodily function.   Over the counter drug management performed.  Prescription drug management performed.  Chronic medical problems impacting care include dementia.  I independently ordered and reviewed each unique test.       The patients assessment required an independent historian: Barbi.  The reason they were needed is

## 2025-01-30 NOTE — H&P
Hospitalist History and Physical   Admit Date:  2025  1:58 PM   Name:  Polo Smith   Age:  89 y.o.  Sex:  male  :  10/9/1934   MRN:  [id number]   Room:  ER01/    Presenting/Chief Complaint: Fatigue     Reason(s) for Admission: Intracerebral hemorrhage, nontraumatic (HCC) [I61.9]     History of Present Illness:   Polo Smith is a 89 y.o. male who presented from [facility] for increased lethargy. Patient is a poor historian, but states he has been feeling weak all over for the past day. Nothing seems to make better or worse. Denies obvious trauma.     CT head today showed - New acute intracranial hemorrhage measuring approximately 2 cm anterior left periventricular region frontally with vasogenic edema and local regional mass effect.    Recent discharge on 25 for stroke like symptoms. MRI brain contraindicated due to titanium rods in spine, on a dysphagia minced and moist diet, DNR, history significant for A-fib on Xarelto, hyperlipidemia, and BPH   Assessment & Plan:     Active Problems:    Intracranial hemorrhage - Balfaxar given for Xarelto reversal in ER. Starting Keppra for seizure prophylaxis. ICU admit. Fall precautions. Neurosurgery spoken to Er provider who wanted repeat CT head in 6 and 12 hours. Per ER provider, neurosurgery did not want steroids.      Influenza A - tamiflu. Supportive care.     ANGEL - pre renal. IV fluids. I have reviewed ECHO from earlier this year. Holding his ARB    HTN - Holding ARB. Cardene drip. BB    A fib - Holding Xarelto    DM type II?- seems diet controlled. Last A1C 5.8. Low dose SS    DNR per nursing home papers.     PT/OT evals ordered?  Therapy evals ordered  Diet: Diet NPO  VTE prophylaxis: SCD's   Code status: DNR  Code status discussed: No  Blood consent obtained: No      Non-peripheral Lines and Tubes (if present):             Hospital Problems:  Principal Problem:    Intracerebral hemorrhage, nontraumatic (HCC)  Active Problems:

## 2025-01-31 ENCOUNTER — APPOINTMENT (OUTPATIENT)
Dept: CT IMAGING | Age: 89
DRG: 064 | End: 2025-01-31
Payer: MEDICARE

## 2025-01-31 ENCOUNTER — APPOINTMENT (OUTPATIENT)
Dept: MRI IMAGING | Age: 89
DRG: 064 | End: 2025-01-31
Payer: MEDICARE

## 2025-01-31 PROBLEM — E87.6 HYPOKALEMIA: Status: ACTIVE | Noted: 2025-01-31

## 2025-01-31 PROBLEM — N39.0 UTI (URINARY TRACT INFECTION): Status: ACTIVE | Noted: 2025-01-31

## 2025-01-31 LAB
ANION GAP SERPL CALC-SCNC: 13 MMOL/L (ref 7–16)
APPEARANCE UR: CLEAR
BACTERIA URNS QL MICRO: ABNORMAL /HPF
BILIRUB UR QL: NEGATIVE
BUN SERPL-MCNC: 38 MG/DL (ref 8–23)
CALCIUM SERPL-MCNC: 9.4 MG/DL (ref 8.8–10.2)
CHLORIDE SERPL-SCNC: 104 MMOL/L (ref 98–107)
CO2 SERPL-SCNC: 25 MMOL/L (ref 20–29)
COLOR UR: ABNORMAL
CREAT SERPL-MCNC: 1.21 MG/DL (ref 0.8–1.3)
EKG ATRIAL RATE: 0 BPM
EKG DIAGNOSIS: NORMAL
EKG Q-T INTERVAL: 387 MS
EKG QRS DURATION: 104 MS
EKG QTC CALCULATION (BAZETT): 436 MS
EKG R AXIS: -25 DEGREES
EKG T AXIS: -84 DEGREES
EKG VENTRICULAR RATE: 76 BPM
EPI CELLS #/AREA URNS HPF: ABNORMAL /HPF
ERYTHROCYTE [DISTWIDTH] IN BLOOD BY AUTOMATED COUNT: 13.7 % (ref 11.9–14.6)
GLUCOSE BLD STRIP.AUTO-MCNC: 121 MG/DL (ref 65–100)
GLUCOSE BLD STRIP.AUTO-MCNC: 130 MG/DL (ref 65–100)
GLUCOSE BLD STRIP.AUTO-MCNC: 135 MG/DL (ref 65–100)
GLUCOSE SERPL-MCNC: 102 MG/DL (ref 70–99)
GLUCOSE UR STRIP.AUTO-MCNC: NEGATIVE MG/DL
HCT VFR BLD AUTO: 45.8 % (ref 41.1–50.3)
HGB BLD-MCNC: 15.1 G/DL (ref 13.6–17.2)
HGB UR QL STRIP: ABNORMAL
HYALINE CASTS URNS QL MICRO: ABNORMAL /LPF
KETONES UR QL STRIP.AUTO: NEGATIVE MG/DL
LEUKOCYTE ESTERASE UR QL STRIP.AUTO: ABNORMAL
MAGNESIUM SERPL-MCNC: 2.2 MG/DL (ref 1.8–2.4)
MCH RBC QN AUTO: 28.3 PG (ref 26.1–32.9)
MCHC RBC AUTO-ENTMCNC: 33 G/DL (ref 31.4–35)
MCV RBC AUTO: 85.9 FL (ref 82–102)
NITRITE UR QL STRIP.AUTO: NEGATIVE
NRBC # BLD: 0 K/UL (ref 0–0.2)
PH UR STRIP: 6.5 (ref 5–9)
PLATELET # BLD AUTO: 151 K/UL (ref 150–450)
PMV BLD AUTO: 10.3 FL (ref 9.4–12.3)
POTASSIUM SERPL-SCNC: 3.4 MMOL/L (ref 3.5–5.1)
PROT UR STRIP-MCNC: 30 MG/DL
RBC # BLD AUTO: 5.33 M/UL (ref 4.23–5.6)
RBC #/AREA URNS HPF: ABNORMAL /HPF
SERVICE CMNT-IMP: ABNORMAL
SODIUM SERPL-SCNC: 141 MMOL/L (ref 136–145)
SP GR UR REFRACTOMETRY: 1.02 (ref 1–1.02)
UROBILINOGEN UR QL STRIP.AUTO: 1 EU/DL (ref 0.2–1)
WBC # BLD AUTO: 4.8 K/UL (ref 4.3–11.1)
WBC URNS QL MICRO: ABNORMAL /HPF

## 2025-01-31 PROCEDURE — 2500000003 HC RX 250 WO HCPCS: Performed by: FAMILY MEDICINE

## 2025-01-31 PROCEDURE — 2580000003 HC RX 258: Performed by: FAMILY MEDICINE

## 2025-01-31 PROCEDURE — 70551 MRI BRAIN STEM W/O DYE: CPT

## 2025-01-31 PROCEDURE — 85027 COMPLETE CBC AUTOMATED: CPT

## 2025-01-31 PROCEDURE — 6370000000 HC RX 637 (ALT 250 FOR IP): Performed by: FAMILY MEDICINE

## 2025-01-31 PROCEDURE — 82962 GLUCOSE BLOOD TEST: CPT

## 2025-01-31 PROCEDURE — 83735 ASSAY OF MAGNESIUM: CPT

## 2025-01-31 PROCEDURE — 92523 SPEECH SOUND LANG COMPREHEN: CPT

## 2025-01-31 PROCEDURE — 93010 ELECTROCARDIOGRAM REPORT: CPT | Performed by: INTERNAL MEDICINE

## 2025-01-31 PROCEDURE — 97530 THERAPEUTIC ACTIVITIES: CPT

## 2025-01-31 PROCEDURE — 87086 URINE CULTURE/COLONY COUNT: CPT

## 2025-01-31 PROCEDURE — 6360000002 HC RX W HCPCS: Performed by: FAMILY MEDICINE

## 2025-01-31 PROCEDURE — 99221 1ST HOSP IP/OBS SF/LOW 40: CPT | Performed by: STUDENT IN AN ORGANIZED HEALTH CARE EDUCATION/TRAINING PROGRAM

## 2025-01-31 PROCEDURE — 36415 COLL VENOUS BLD VENIPUNCTURE: CPT

## 2025-01-31 PROCEDURE — 2000000000 HC ICU R&B

## 2025-01-31 PROCEDURE — 97166 OT EVAL MOD COMPLEX 45 MIN: CPT

## 2025-01-31 PROCEDURE — 80048 BASIC METABOLIC PNL TOTAL CA: CPT

## 2025-01-31 PROCEDURE — 97161 PT EVAL LOW COMPLEX 20 MIN: CPT

## 2025-01-31 PROCEDURE — 81001 URINALYSIS AUTO W/SCOPE: CPT

## 2025-01-31 PROCEDURE — 92610 EVALUATE SWALLOWING FUNCTION: CPT

## 2025-01-31 PROCEDURE — 97535 SELF CARE MNGMENT TRAINING: CPT

## 2025-01-31 PROCEDURE — 70450 CT HEAD/BRAIN W/O DYE: CPT

## 2025-01-31 PROCEDURE — 6360000002 HC RX W HCPCS: Performed by: NURSE PRACTITIONER

## 2025-01-31 RX ORDER — 0.9 % SODIUM CHLORIDE 0.9 %
250 INTRAVENOUS SOLUTION INTRAVENOUS ONCE
Status: COMPLETED | OUTPATIENT
Start: 2025-01-31 | End: 2025-01-31

## 2025-01-31 RX ORDER — 0.9 % SODIUM CHLORIDE 0.9 %
500 INTRAVENOUS SOLUTION INTRAVENOUS ONCE
Status: DISCONTINUED | OUTPATIENT
Start: 2025-01-31 | End: 2025-02-06 | Stop reason: HOSPADM

## 2025-01-31 RX ORDER — POTASSIUM CHLORIDE 7.45 MG/ML
10 INJECTION INTRAVENOUS
Status: COMPLETED | OUTPATIENT
Start: 2025-01-31 | End: 2025-01-31

## 2025-01-31 RX ORDER — LABETALOL HYDROCHLORIDE 5 MG/ML
10 INJECTION, SOLUTION INTRAVENOUS EVERY 4 HOURS PRN
Status: DISCONTINUED | OUTPATIENT
Start: 2025-01-31 | End: 2025-02-06 | Stop reason: HOSPADM

## 2025-01-31 RX ADMIN — SODIUM CHLORIDE, PRESERVATIVE FREE 10 ML: 5 INJECTION INTRAVENOUS at 21:20

## 2025-01-31 RX ADMIN — WATER 1000 MG: 1 INJECTION INTRAMUSCULAR; INTRAVENOUS; SUBCUTANEOUS at 09:05

## 2025-01-31 RX ADMIN — OSELTAMIVIR 30 MG: 30 CAPSULE ORAL at 21:20

## 2025-01-31 RX ADMIN — FINASTERIDE 5 MG: 5 TABLET, FILM COATED ORAL at 09:05

## 2025-01-31 RX ADMIN — CARVEDILOL 25 MG: 25 TABLET, FILM COATED ORAL at 09:05

## 2025-01-31 RX ADMIN — POTASSIUM CHLORIDE 10 MEQ: 7.46 INJECTION, SOLUTION INTRAVENOUS at 12:31

## 2025-01-31 RX ADMIN — EZETIMIBE 10 MG: 10 TABLET ORAL at 09:05

## 2025-01-31 RX ADMIN — LABETALOL HYDROCHLORIDE 10 MG: 5 INJECTION INTRAVENOUS at 23:01

## 2025-01-31 RX ADMIN — POTASSIUM CHLORIDE 10 MEQ: 7.46 INJECTION, SOLUTION INTRAVENOUS at 09:19

## 2025-01-31 RX ADMIN — SODIUM CHLORIDE 250 ML: 9 INJECTION, SOLUTION INTRAVENOUS at 10:45

## 2025-01-31 RX ADMIN — SODIUM CHLORIDE, PRESERVATIVE FREE 10 ML: 5 INJECTION INTRAVENOUS at 09:06

## 2025-01-31 RX ADMIN — POTASSIUM CHLORIDE 10 MEQ: 7.46 INJECTION, SOLUTION INTRAVENOUS at 11:30

## 2025-01-31 RX ADMIN — OSELTAMIVIR 30 MG: 30 CAPSULE ORAL at 09:05

## 2025-01-31 RX ADMIN — LEVETIRACETAM 500 MG: 100 INJECTION INTRAVENOUS at 06:29

## 2025-01-31 RX ADMIN — POTASSIUM CHLORIDE 10 MEQ: 7.46 INJECTION, SOLUTION INTRAVENOUS at 10:32

## 2025-01-31 RX ADMIN — LEVETIRACETAM 500 MG: 100 INJECTION INTRAVENOUS at 18:49

## 2025-01-31 ASSESSMENT — PAIN SCALES - GENERAL
PAINLEVEL_OUTOF10: 0

## 2025-01-31 NOTE — CONSULTS
NEUROSURGERY CONSULT NOTE:     CC: Intraparenchymal hemorrhage    Assessment and Plan:   89-year-old gentleman with history of A-fib on Xarelto who presented with left frontal intraparenchymal hemorrhage after being found to be confused.    -hold all antiplatelets and anticoagulants.  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  Titanium bonita should not affect MRI acquisition.  Please reassess and have patient get MRI brain.  -Repeat CT tomorrow, if stable patient can be discharged from neurosurgery standpoint.    HPI:   Polo Smith89 y.o. male with a PMH of A-fib on Xarelto who presents  with dysphagia and confusion.  Patient was reported to have felt weak over the past few days.  Patient denied any previous trauma.  Patient was found to be confused and had dysphagia so patient was brought to the emergency room to be evaluated.  Upon presentation to the emergency room patient was found to have 2 left frontal parenchymal hemorrhage.  Neurosurgery was consulted for hemorrhage.    ICH Score:  GCS        13-15: 0        5-12: 1        3-4: 2    0   ICH Volume        < 30cc: 0        > 30cc: 1 0   IVH        No: 0        Yes: 1 0   Infratentorial        No: 0        Yes: 1 0   Age (years)       < 80 yrs: 0       > 80 yrs: 1 1   Total 1      ICH Score 30-day Mortality (%)   0 0%   1 13%   2 26%   3 72%   4 97%   5 100%   Overall ~40%          No past medical history on file.  No past surgical history on file.  Allergies   Allergen Reactions    Statins Other (See Comments)     Restless legs/night terrors    Night terrors    Night horrors     Social History     Socioeconomic History    Marital status:      Spouse name: Not on file    Number of children: Not on file    Years of education: Not on file    Highest education level:

## 2025-01-31 NOTE — FLOWSHEET NOTE
Dual NIH completed with CHRISSY Avendano     01/30/25 3394   NIHSS Stroke Scale   NIH Stroke Scale Assessed Yes   Interval Hand-off/Transfer   Level of Consciousness (1a) 2   LOC Questions (1b) 2   LOC Commands (1c) 1   Best Gaze (2) 0   Visual (3) 0   Facial Palsy (4) 0   Motor Arm, Left (5a) 1   Motor Arm, Right (5b) 0   Motor Leg, Left (6a) 0   Motor Leg, Right (6b) 0   Limb Ataxia (7) (!) 2   Sensory (8) 0   Best Language (9) 2   Dysarthria (10) 1   Extinction and Inattention (11) 0   Total 11   NIHSS Stroke Scale Assessed Yes     Signed: CHRISSY Avendano

## 2025-01-31 NOTE — ED NOTES
During bedside shift report, pt noted to have cardene infusion running. BP taken and pt found to be hypotensive, see VSS flowsheet. Cardene infusion stopped, provider notified. Orders to monitor blood pressure. BP back up to 110s systolic.     Debra Rutledge, RN  01/30/25 2029       Debra Rutledge RN  01/30/25 2032

## 2025-01-31 NOTE — INTERDISCIPLINARY ROUNDS
Multi-D Rounds/Checklist (leapfrog):  Lines: can any be removed?: None    External Urinary Catheter (Active)      DVT Prophylaxis: Ordered  Vent: N/A  Nutrition Ordered/appropriate: Contraindicated- per surgery  Can antibiotics or other drugs be stopped? Yes/End Date set Yes/No  MRSA swab:   Inpat Anti-Infectives (From admission, onward)       Start     Ordered Stop    01/31/25 0900  oseltamivir (TAMIFLU) capsule 30 mg  30 mg,   Oral,   2 TIMES DAILY         01/30/25 1743 02/04/25 0859    01/31/25 0800  cefTRIAXone (ROCEPHIN) 1,000 mg in sterile water 10 mL IV syringe  1,000 mg,   IntraVENous,   EVERY 24 HOURS         01/31/25 0738 02/05/25 0759                  Consults needed: None  A: Is pain control adequate? (has PRNs? Stop drip?) Yes  B: Sedation break and SBT? N/A  C: Is sedation choice appropriate? N/A  D: Delirium/CAM-ICU? Yes  E: Mobility goals/appropriateness? Yes  F: Family update and plan? child is surrogate decision maker and is being updated daily by primary attending and nursing staff.    Kaya Charlton, APRN - CNP

## 2025-01-31 NOTE — ED NOTES
TRANSFER - OUT REPORT:    Verbal report given to Laura LOWE on Polo Smith  being transferred to [facility] for routine progression of patient care       Report consisted of patient's Situation, Background, Assessment and   Recommendations(SBAR).     Information from the following report(s) Nurse Handoff Report was reviewed with the receiving nurse.    Lines:   Peripheral IV 01/30/25 Left Forearm (Active)        Opportunity for questions and clarification was provided.      Patient transported with:  Registered Nurse      Debra Rutledge RN  01/30/25 6828

## 2025-02-01 LAB
ANION GAP SERPL CALC-SCNC: 12 MMOL/L (ref 7–16)
BUN SERPL-MCNC: 34 MG/DL (ref 8–23)
CALCIUM SERPL-MCNC: 9.2 MG/DL (ref 8.8–10.2)
CHLORIDE SERPL-SCNC: 106 MMOL/L (ref 98–107)
CO2 SERPL-SCNC: 22 MMOL/L (ref 20–29)
CREAT SERPL-MCNC: 1.05 MG/DL (ref 0.8–1.3)
ERYTHROCYTE [DISTWIDTH] IN BLOOD BY AUTOMATED COUNT: 14 % (ref 11.9–14.6)
GLUCOSE BLD STRIP.AUTO-MCNC: 105 MG/DL (ref 65–100)
GLUCOSE BLD STRIP.AUTO-MCNC: 105 MG/DL (ref 65–100)
GLUCOSE BLD STRIP.AUTO-MCNC: 109 MG/DL (ref 65–100)
GLUCOSE BLD STRIP.AUTO-MCNC: 110 MG/DL (ref 65–100)
GLUCOSE SERPL-MCNC: 107 MG/DL (ref 70–99)
HCT VFR BLD AUTO: 42.8 % (ref 41.1–50.3)
HGB BLD-MCNC: 14.1 G/DL (ref 13.6–17.2)
MAGNESIUM SERPL-MCNC: 2.2 MG/DL (ref 1.8–2.4)
MCH RBC QN AUTO: 28.1 PG (ref 26.1–32.9)
MCHC RBC AUTO-ENTMCNC: 32.9 G/DL (ref 31.4–35)
MCV RBC AUTO: 85.3 FL (ref 82–102)
NRBC # BLD: 0 K/UL (ref 0–0.2)
PLATELET # BLD AUTO: 153 K/UL (ref 150–450)
PMV BLD AUTO: 10.5 FL (ref 9.4–12.3)
POTASSIUM SERPL-SCNC: 3.8 MMOL/L (ref 3.5–5.1)
RBC # BLD AUTO: 5.02 M/UL (ref 4.23–5.6)
SERVICE CMNT-IMP: ABNORMAL
SODIUM SERPL-SCNC: 140 MMOL/L (ref 136–145)
WBC # BLD AUTO: 5.1 K/UL (ref 4.3–11.1)

## 2025-02-01 PROCEDURE — 99231 SBSQ HOSP IP/OBS SF/LOW 25: CPT | Performed by: NEUROLOGICAL SURGERY

## 2025-02-01 PROCEDURE — 6360000002 HC RX W HCPCS: Performed by: FAMILY MEDICINE

## 2025-02-01 PROCEDURE — 36415 COLL VENOUS BLD VENIPUNCTURE: CPT

## 2025-02-01 PROCEDURE — 1100000000 HC RM PRIVATE

## 2025-02-01 PROCEDURE — 2580000003 HC RX 258: Performed by: FAMILY MEDICINE

## 2025-02-01 PROCEDURE — 6370000000 HC RX 637 (ALT 250 FOR IP): Performed by: FAMILY MEDICINE

## 2025-02-01 PROCEDURE — 2000000000 HC ICU R&B

## 2025-02-01 PROCEDURE — 85027 COMPLETE CBC AUTOMATED: CPT

## 2025-02-01 PROCEDURE — 82962 GLUCOSE BLOOD TEST: CPT

## 2025-02-01 PROCEDURE — 80048 BASIC METABOLIC PNL TOTAL CA: CPT

## 2025-02-01 PROCEDURE — 2500000003 HC RX 250 WO HCPCS: Performed by: FAMILY MEDICINE

## 2025-02-01 PROCEDURE — 83735 ASSAY OF MAGNESIUM: CPT

## 2025-02-01 RX ORDER — 0.9 % SODIUM CHLORIDE 0.9 %
250 INTRAVENOUS SOLUTION INTRAVENOUS ONCE
Status: COMPLETED | OUTPATIENT
Start: 2025-02-01 | End: 2025-02-01

## 2025-02-01 RX ORDER — CARVEDILOL 6.25 MG/1
6.25 TABLET ORAL 2 TIMES DAILY WITH MEALS
Status: DISCONTINUED | OUTPATIENT
Start: 2025-02-01 | End: 2025-02-03

## 2025-02-01 RX ADMIN — SODIUM CHLORIDE 250 ML: 9 INJECTION, SOLUTION INTRAVENOUS at 13:25

## 2025-02-01 RX ADMIN — LEVETIRACETAM 500 MG: 100 INJECTION INTRAVENOUS at 17:32

## 2025-02-01 RX ADMIN — SODIUM CHLORIDE, PRESERVATIVE FREE 10 ML: 5 INJECTION INTRAVENOUS at 21:01

## 2025-02-01 RX ADMIN — CARVEDILOL 25 MG: 25 TABLET, FILM COATED ORAL at 09:42

## 2025-02-01 RX ADMIN — CARVEDILOL 6.25 MG: 6.25 TABLET, FILM COATED ORAL at 17:32

## 2025-02-01 RX ADMIN — LEVETIRACETAM 500 MG: 100 INJECTION INTRAVENOUS at 05:45

## 2025-02-01 RX ADMIN — EZETIMIBE 10 MG: 10 TABLET ORAL at 09:42

## 2025-02-01 RX ADMIN — NICARDIPINE HYDROCHLORIDE 5 MG/HR: 0.1 INJECTION, SOLUTION INTRAVENOUS at 02:02

## 2025-02-01 RX ADMIN — OSELTAMIVIR 30 MG: 30 CAPSULE ORAL at 09:42

## 2025-02-01 RX ADMIN — FINASTERIDE 5 MG: 5 TABLET, FILM COATED ORAL at 09:42

## 2025-02-01 RX ADMIN — SODIUM CHLORIDE, PRESERVATIVE FREE 10 ML: 5 INJECTION INTRAVENOUS at 09:41

## 2025-02-01 RX ADMIN — WATER 1000 MG: 1 INJECTION INTRAMUSCULAR; INTRAVENOUS; SUBCUTANEOUS at 09:39

## 2025-02-01 RX ADMIN — OSELTAMIVIR 30 MG: 30 CAPSULE ORAL at 21:04

## 2025-02-01 ASSESSMENT — PAIN SCALES - GENERAL
PAINLEVEL_OUTOF10: 0

## 2025-02-02 LAB
ANION GAP SERPL CALC-SCNC: 12 MMOL/L (ref 7–16)
BACTERIA SPEC CULT: NORMAL
BUN SERPL-MCNC: 31 MG/DL (ref 8–23)
CALCIUM SERPL-MCNC: 9.4 MG/DL (ref 8.8–10.2)
CHLORIDE SERPL-SCNC: 104 MMOL/L (ref 98–107)
CO2 SERPL-SCNC: 23 MMOL/L (ref 20–29)
CORTIS AM PEAK SERPL-MCNC: 12.7 UG/DL (ref 4.8–19.5)
CREAT SERPL-MCNC: 1.12 MG/DL (ref 0.8–1.3)
ERYTHROCYTE [DISTWIDTH] IN BLOOD BY AUTOMATED COUNT: 13.9 % (ref 11.9–14.6)
GLUCOSE BLD STRIP.AUTO-MCNC: 122 MG/DL (ref 65–100)
GLUCOSE BLD STRIP.AUTO-MCNC: 84 MG/DL (ref 65–100)
GLUCOSE SERPL-MCNC: 99 MG/DL (ref 70–99)
HCT VFR BLD AUTO: 46.9 % (ref 41.1–50.3)
HGB BLD-MCNC: 15.6 G/DL (ref 13.6–17.2)
MCH RBC QN AUTO: 28.2 PG (ref 26.1–32.9)
MCHC RBC AUTO-ENTMCNC: 33.3 G/DL (ref 31.4–35)
MCV RBC AUTO: 84.8 FL (ref 82–102)
NRBC # BLD: 0 K/UL (ref 0–0.2)
PLATELET # BLD AUTO: 138 K/UL (ref 150–450)
PMV BLD AUTO: 10.4 FL (ref 9.4–12.3)
POTASSIUM SERPL-SCNC: 3.9 MMOL/L (ref 3.5–5.1)
RBC # BLD AUTO: 5.53 M/UL (ref 4.23–5.6)
SERVICE CMNT-IMP: ABNORMAL
SERVICE CMNT-IMP: NORMAL
SERVICE CMNT-IMP: NORMAL
SODIUM SERPL-SCNC: 139 MMOL/L (ref 136–145)
WBC # BLD AUTO: 4.9 K/UL (ref 4.3–11.1)

## 2025-02-02 PROCEDURE — 85027 COMPLETE CBC AUTOMATED: CPT

## 2025-02-02 PROCEDURE — 6360000002 HC RX W HCPCS: Performed by: FAMILY MEDICINE

## 2025-02-02 PROCEDURE — 6370000000 HC RX 637 (ALT 250 FOR IP): Performed by: FAMILY MEDICINE

## 2025-02-02 PROCEDURE — 2500000003 HC RX 250 WO HCPCS: Performed by: FAMILY MEDICINE

## 2025-02-02 PROCEDURE — 80048 BASIC METABOLIC PNL TOTAL CA: CPT

## 2025-02-02 PROCEDURE — 82962 GLUCOSE BLOOD TEST: CPT

## 2025-02-02 PROCEDURE — 36415 COLL VENOUS BLD VENIPUNCTURE: CPT

## 2025-02-02 PROCEDURE — 82533 TOTAL CORTISOL: CPT

## 2025-02-02 PROCEDURE — 1100000003 HC PRIVATE W/ TELEMETRY

## 2025-02-02 RX ADMIN — SODIUM CHLORIDE, PRESERVATIVE FREE 10 ML: 5 INJECTION INTRAVENOUS at 09:20

## 2025-02-02 RX ADMIN — LEVETIRACETAM 500 MG: 100 INJECTION INTRAVENOUS at 19:18

## 2025-02-02 RX ADMIN — WATER 1000 MG: 1 INJECTION INTRAMUSCULAR; INTRAVENOUS; SUBCUTANEOUS at 09:20

## 2025-02-02 RX ADMIN — EZETIMIBE 10 MG: 10 TABLET ORAL at 09:19

## 2025-02-02 RX ADMIN — OSELTAMIVIR 30 MG: 30 CAPSULE ORAL at 21:40

## 2025-02-02 RX ADMIN — CARVEDILOL 6.25 MG: 6.25 TABLET, FILM COATED ORAL at 09:19

## 2025-02-02 RX ADMIN — CARVEDILOL 6.25 MG: 6.25 TABLET, FILM COATED ORAL at 19:17

## 2025-02-02 RX ADMIN — SODIUM CHLORIDE, PRESERVATIVE FREE 5 ML: 5 INJECTION INTRAVENOUS at 21:41

## 2025-02-02 RX ADMIN — FINASTERIDE 5 MG: 5 TABLET, FILM COATED ORAL at 09:19

## 2025-02-02 RX ADMIN — OSELTAMIVIR 30 MG: 30 CAPSULE ORAL at 09:19

## 2025-02-02 RX ADMIN — LEVETIRACETAM 500 MG: 100 INJECTION INTRAVENOUS at 05:50

## 2025-02-02 ASSESSMENT — PAIN SCALES - GENERAL
PAINLEVEL_OUTOF10: 0

## 2025-02-02 NOTE — CARE COORDINATION
Pt chart reviewed for discharge planning.  Pt is known to CM staff from recent admission and discharge to [facility] for rehab.  Readmission assessment completed with pt's son, Andrés Smith. Pt has been seen by therapy and recommended for return to rehab when stable.  Referral sent back to [facility] as requested by sonAndrés.  CM staff will follow pt plan of care and assist with pt return to rehab and any other supportive care needs pending pt clinical progress.       02/02/25 9692   Service Assessment   Patient Orientation Alert and Oriented;Person   Cognition Dementia / Early Alzheimer's   History Provided By Child/Family   Primary Caregiver Family   Support Systems Children   Patient's Healthcare Decision Maker is: Named in Scanned ACP Document   PCP Verified by CM Yes   Last Visit to PCP Within last 3 months   Prior Functional Level Independent in ADLs/IADLs   Current Functional Level Assistance with the following:;Mobility   Can patient return to prior living arrangement Other (see comment)  (return to SNF rehab)   Ability to make needs known: Fair   Family able to assist with home care needs: Yes   Would you like for me to discuss the discharge plan with any other family members/significant others, and if so, who? Yes   Financial Resources Medicare   Community Resources Other (Comment)  (SNF rehab)   CM/SW Referral Other (see comment)  (SNF rehab)   Social/Functional History   Lives With Spouse   Type of Home House   Home Layout One level   Home Access Stairs to enter without rails   Entrance Stairs - Number of Steps 2   Bathroom Toilet Standard   Bathroom Equipment Commode   Bathroom Accessibility Accessible   Home Equipment None   Receives Help From Family   Active  No   Mode of Transportation Family   Education Family   Occupation Retired   Discharge Planning   Type of Residence Skilled Nursing Facility   Living Arrangements Spouse/Significant Other   Current Services Prior To Admission None

## 2025-02-03 LAB
ANION GAP SERPL CALC-SCNC: 14 MMOL/L (ref 7–16)
BUN SERPL-MCNC: 31 MG/DL (ref 8–23)
CALCIUM SERPL-MCNC: 9.2 MG/DL (ref 8.8–10.2)
CHLORIDE SERPL-SCNC: 102 MMOL/L (ref 98–107)
CO2 SERPL-SCNC: 23 MMOL/L (ref 20–29)
CREAT SERPL-MCNC: 1.08 MG/DL (ref 0.8–1.3)
GLUCOSE BLD STRIP.AUTO-MCNC: 105 MG/DL (ref 65–100)
GLUCOSE BLD STRIP.AUTO-MCNC: 106 MG/DL (ref 65–100)
GLUCOSE BLD STRIP.AUTO-MCNC: 142 MG/DL (ref 65–100)
GLUCOSE SERPL-MCNC: 96 MG/DL (ref 70–99)
POTASSIUM SERPL-SCNC: 3.6 MMOL/L (ref 3.5–5.1)
SERVICE CMNT-IMP: ABNORMAL
SODIUM SERPL-SCNC: 138 MMOL/L (ref 136–145)

## 2025-02-03 PROCEDURE — 82962 GLUCOSE BLOOD TEST: CPT

## 2025-02-03 PROCEDURE — 6370000000 HC RX 637 (ALT 250 FOR IP): Performed by: PHYSICIAN ASSISTANT

## 2025-02-03 PROCEDURE — 2500000003 HC RX 250 WO HCPCS: Performed by: FAMILY MEDICINE

## 2025-02-03 PROCEDURE — 6360000002 HC RX W HCPCS: Performed by: FAMILY MEDICINE

## 2025-02-03 PROCEDURE — 6370000000 HC RX 637 (ALT 250 FOR IP): Performed by: FAMILY MEDICINE

## 2025-02-03 PROCEDURE — 1100000000 HC RM PRIVATE

## 2025-02-03 PROCEDURE — 36415 COLL VENOUS BLD VENIPUNCTURE: CPT

## 2025-02-03 PROCEDURE — 92526 ORAL FUNCTION THERAPY: CPT

## 2025-02-03 PROCEDURE — 80048 BASIC METABOLIC PNL TOTAL CA: CPT

## 2025-02-03 RX ORDER — CARVEDILOL 3.12 MG/1
3.12 TABLET ORAL 2 TIMES DAILY WITH MEALS
Status: DISCONTINUED | OUTPATIENT
Start: 2025-02-03 | End: 2025-02-06 | Stop reason: HOSPADM

## 2025-02-03 RX ADMIN — CARVEDILOL 3.12 MG: 3.12 TABLET, FILM COATED ORAL at 17:30

## 2025-02-03 RX ADMIN — CARVEDILOL 6.25 MG: 6.25 TABLET, FILM COATED ORAL at 08:33

## 2025-02-03 RX ADMIN — LEVETIRACETAM 500 MG: 100 INJECTION INTRAVENOUS at 17:30

## 2025-02-03 RX ADMIN — LEVETIRACETAM 500 MG: 100 INJECTION INTRAVENOUS at 05:32

## 2025-02-03 RX ADMIN — OSELTAMIVIR 30 MG: 30 CAPSULE ORAL at 20:51

## 2025-02-03 RX ADMIN — OSELTAMIVIR 30 MG: 30 CAPSULE ORAL at 08:33

## 2025-02-03 RX ADMIN — SODIUM CHLORIDE, PRESERVATIVE FREE 10 ML: 5 INJECTION INTRAVENOUS at 20:51

## 2025-02-03 RX ADMIN — SODIUM CHLORIDE, PRESERVATIVE FREE 10 ML: 5 INJECTION INTRAVENOUS at 08:34

## 2025-02-03 RX ADMIN — EZETIMIBE 10 MG: 10 TABLET ORAL at 08:34

## 2025-02-03 RX ADMIN — FINASTERIDE 5 MG: 5 TABLET, FILM COATED ORAL at 08:34

## 2025-02-03 ASSESSMENT — PAIN SCALES - GENERAL
PAINLEVEL_OUTOF10: 0
PAINLEVEL_OUTOF10: 0

## 2025-02-03 NOTE — PLAN OF CARE
Problem: Chronic Conditions and Co-morbidities  Goal: Patient's chronic conditions and co-morbidity symptoms are monitored and maintained or improved  Outcome: Progressing  Flowsheets (Taken 2/2/2025 1931)  Care Plan - Patient's Chronic Conditions and Co-Morbidity Symptoms are Monitored and Maintained or Improved: Monitor and assess patient's chronic conditions and comorbid symptoms for stability, deterioration, or improvement     Problem: Discharge Planning  Goal: Discharge to home or other facility with appropriate resources  Outcome: Progressing  Flowsheets (Taken 2/2/2025 1931)  Discharge to home or other facility with appropriate resources:   Identify barriers to discharge with patient and caregiver   Arrange for needed discharge resources and transportation as appropriate   Identify discharge learning needs (meds, wound care, etc)   Refer to discharge planning if patient needs post-hospital services based on physician order or complex needs related to functional status, cognitive ability or social support system     Problem: Pain  Goal: Verbalizes/displays adequate comfort level or baseline comfort level  Outcome: Progressing  Flowsheets (Taken 2/2/2025 1931)  Verbalizes/displays adequate comfort level or baseline comfort level: Encourage patient to monitor pain and request assistance     Problem: Safety - Adult  Goal: Free from fall injury  Outcome: Progressing  Flowsheets (Taken 2/2/2025 1931)  Free From Fall Injury: Instruct family/caregiver on patient safety     Problem: Safety - Medical Restraint  Goal: Remains free of injury from restraints (Restraint for Interference with Medical Device)  Description: INTERVENTIONS:  1. Determine that other, less restrictive measures have been tried or would not be effective before applying the restraint  2. Evaluate the patient's condition at the time of restraint application  3. Inform patient/family regarding the reason for restraint  4. Q2H: Monitor safety,

## 2025-02-04 LAB
GLUCOSE BLD STRIP.AUTO-MCNC: 105 MG/DL (ref 65–100)
GLUCOSE BLD STRIP.AUTO-MCNC: 130 MG/DL (ref 65–100)
GLUCOSE BLD STRIP.AUTO-MCNC: 146 MG/DL (ref 65–100)
GLUCOSE BLD STRIP.AUTO-MCNC: 167 MG/DL (ref 65–100)
SERVICE CMNT-IMP: ABNORMAL

## 2025-02-04 PROCEDURE — 6370000000 HC RX 637 (ALT 250 FOR IP): Performed by: FAMILY MEDICINE

## 2025-02-04 PROCEDURE — 2500000003 HC RX 250 WO HCPCS: Performed by: FAMILY MEDICINE

## 2025-02-04 PROCEDURE — 6360000002 HC RX W HCPCS: Performed by: FAMILY MEDICINE

## 2025-02-04 PROCEDURE — 82962 GLUCOSE BLOOD TEST: CPT

## 2025-02-04 PROCEDURE — 6370000000 HC RX 637 (ALT 250 FOR IP): Performed by: PHYSICIAN ASSISTANT

## 2025-02-04 PROCEDURE — 1100000000 HC RM PRIVATE

## 2025-02-04 PROCEDURE — 92526 ORAL FUNCTION THERAPY: CPT

## 2025-02-04 PROCEDURE — 97112 NEUROMUSCULAR REEDUCATION: CPT

## 2025-02-04 PROCEDURE — 97530 THERAPEUTIC ACTIVITIES: CPT

## 2025-02-04 RX ORDER — IPRATROPIUM BROMIDE AND ALBUTEROL SULFATE 2.5; .5 MG/3ML; MG/3ML
SOLUTION RESPIRATORY (INHALATION)
Status: DISPENSED
Start: 2025-02-04 | End: 2025-02-05

## 2025-02-04 RX ADMIN — LEVETIRACETAM 500 MG: 100 INJECTION INTRAVENOUS at 17:41

## 2025-02-04 RX ADMIN — EZETIMIBE 10 MG: 10 TABLET ORAL at 08:40

## 2025-02-04 RX ADMIN — SODIUM CHLORIDE, PRESERVATIVE FREE 10 ML: 5 INJECTION INTRAVENOUS at 08:40

## 2025-02-04 RX ADMIN — CARVEDILOL 3.12 MG: 3.12 TABLET, FILM COATED ORAL at 08:40

## 2025-02-04 RX ADMIN — LEVETIRACETAM 500 MG: 100 INJECTION INTRAVENOUS at 05:33

## 2025-02-04 RX ADMIN — CARVEDILOL 3.12 MG: 3.12 TABLET, FILM COATED ORAL at 17:41

## 2025-02-04 RX ADMIN — FINASTERIDE 5 MG: 5 TABLET, FILM COATED ORAL at 08:40

## 2025-02-04 RX ADMIN — SODIUM CHLORIDE, PRESERVATIVE FREE 10 ML: 5 INJECTION INTRAVENOUS at 21:00

## 2025-02-04 ASSESSMENT — PAIN SCALES - GENERAL: PAINLEVEL_OUTOF10: 0

## 2025-02-04 NOTE — PLAN OF CARE
Problem: Chronic Conditions and Co-morbidities  Goal: Patient's chronic conditions and co-morbidity symptoms are monitored and maintained or improved  Outcome: Progressing  Flowsheets (Taken 2/3/2025 1911)  Care Plan - Patient's Chronic Conditions and Co-Morbidity Symptoms are Monitored and Maintained or Improved: Monitor and assess patient's chronic conditions and comorbid symptoms for stability, deterioration, or improvement     Problem: Discharge Planning  Goal: Discharge to home or other facility with appropriate resources  Outcome: Progressing  Flowsheets (Taken 2/3/2025 1911)  Discharge to home or other facility with appropriate resources:   Identify barriers to discharge with patient and caregiver   Arrange for needed discharge resources and transportation as appropriate   Identify discharge learning needs (meds, wound care, etc)   Refer to discharge planning if patient needs post-hospital services based on physician order or complex needs related to functional status, cognitive ability or social support system     Problem: Pain  Goal: Verbalizes/displays adequate comfort level or baseline comfort level  Outcome: Progressing  Flowsheets (Taken 2/3/2025 1911)  Verbalizes/displays adequate comfort level or baseline comfort level: Encourage patient to monitor pain and request assistance     Problem: Safety - Medical Restraint  Goal: Remains free of injury from restraints (Restraint for Interference with Medical Device)  Description: INTERVENTIONS:  1. Determine that other, less restrictive measures have been tried or would not be effective before applying the restraint  2. Evaluate the patient's condition at the time of restraint application  3. Inform patient/family regarding the reason for restraint  4. Q2H: Monitor safety, psychosocial status, comfort, nutrition and hydration  Outcome: Progressing     Problem: Skin/Tissue Integrity  Goal: Skin integrity remains intact  Description: 1.  Monitor for areas of

## 2025-02-05 ENCOUNTER — APPOINTMENT (OUTPATIENT)
Dept: CT IMAGING | Age: 89
DRG: 064 | End: 2025-02-05
Payer: MEDICARE

## 2025-02-05 LAB
GLUCOSE BLD STRIP.AUTO-MCNC: 125 MG/DL (ref 65–100)
GLUCOSE BLD STRIP.AUTO-MCNC: 133 MG/DL (ref 65–100)
GLUCOSE BLD STRIP.AUTO-MCNC: 134 MG/DL (ref 65–100)
GLUCOSE BLD STRIP.AUTO-MCNC: 164 MG/DL (ref 65–100)
SARS-COV-2 RDRP RESP QL NAA+PROBE: NOT DETECTED
SERVICE CMNT-IMP: ABNORMAL
SOURCE: NORMAL

## 2025-02-05 PROCEDURE — 2500000003 HC RX 250 WO HCPCS: Performed by: FAMILY MEDICINE

## 2025-02-05 PROCEDURE — 97530 THERAPEUTIC ACTIVITIES: CPT

## 2025-02-05 PROCEDURE — 97535 SELF CARE MNGMENT TRAINING: CPT

## 2025-02-05 PROCEDURE — 70450 CT HEAD/BRAIN W/O DYE: CPT

## 2025-02-05 PROCEDURE — 6370000000 HC RX 637 (ALT 250 FOR IP): Performed by: FAMILY MEDICINE

## 2025-02-05 PROCEDURE — 1100000000 HC RM PRIVATE

## 2025-02-05 PROCEDURE — 6370000000 HC RX 637 (ALT 250 FOR IP): Performed by: PHYSICIAN ASSISTANT

## 2025-02-05 PROCEDURE — 97112 NEUROMUSCULAR REEDUCATION: CPT

## 2025-02-05 PROCEDURE — 82962 GLUCOSE BLOOD TEST: CPT

## 2025-02-05 PROCEDURE — 87635 SARS-COV-2 COVID-19 AMP PRB: CPT

## 2025-02-05 PROCEDURE — 6360000002 HC RX W HCPCS: Performed by: FAMILY MEDICINE

## 2025-02-05 RX ADMIN — FINASTERIDE 5 MG: 5 TABLET, FILM COATED ORAL at 08:55

## 2025-02-05 RX ADMIN — LEVETIRACETAM 500 MG: 100 INJECTION INTRAVENOUS at 18:02

## 2025-02-05 RX ADMIN — SODIUM CHLORIDE, PRESERVATIVE FREE 10 ML: 5 INJECTION INTRAVENOUS at 08:56

## 2025-02-05 RX ADMIN — LEVETIRACETAM 500 MG: 100 INJECTION INTRAVENOUS at 05:36

## 2025-02-05 RX ADMIN — SODIUM CHLORIDE, PRESERVATIVE FREE 10 ML: 5 INJECTION INTRAVENOUS at 21:25

## 2025-02-05 RX ADMIN — CARVEDILOL 3.12 MG: 3.12 TABLET, FILM COATED ORAL at 08:55

## 2025-02-05 RX ADMIN — EZETIMIBE 10 MG: 10 TABLET ORAL at 08:55

## 2025-02-05 NOTE — CARE COORDINATION
Multiple messages sent to [facility] today for f/u regarding when patient is able to return since recent positive influenza test. Awaiting response. Patient has met required 3 inpatient Medicare midnight.

## 2025-02-05 NOTE — PLAN OF CARE
Problem: Chronic Conditions and Co-morbidities  Goal: Patient's chronic conditions and co-morbidity symptoms are monitored and maintained or improved  2/4/2025 2352 by Graciela Lowe GN  Outcome: Progressing  Flowsheets (Taken 2/4/2025 1921)  Care Plan - Patient's Chronic Conditions and Co-Morbidity Symptoms are Monitored and Maintained or Improved: Monitor and assess patient's chronic conditions and comorbid symptoms for stability, deterioration, or improvement     Problem: Discharge Planning  Goal: Discharge to home or other facility with appropriate resources  2/4/2025 2352 by Graciela Lowe GN  Outcome: Progressing  Flowsheets (Taken 2/4/2025 1921)  Discharge to home or other facility with appropriate resources:   Identify barriers to discharge with patient and caregiver   Arrange for needed discharge resources and transportation as appropriate   Identify discharge learning needs (meds, wound care, etc)   Refer to discharge planning if patient needs post-hospital services based on physician order or complex needs related to functional status, cognitive ability or social support system     Problem: Pain  Goal: Verbalizes/displays adequate comfort level or baseline comfort level  2/4/2025 2352 by Graciela Lowe GN  Outcome: Progressing  Flowsheets (Taken 2/4/2025 1921)  Verbalizes/displays adequate comfort level or baseline comfort level: Encourage patient to monitor pain and request assistance     Problem: Safety - Adult  Goal: Free from fall injury  2/4/2025 2352 by Graciela Lowe GN  Outcome: Progressing  Flowsheets (Taken 2/4/2025 1921)  Free From Fall Injury: Based on caregiver fall risk screen, instruct family/caregiver to ask for assistance with transferring infant if caregiver noted to have fall risk factors     Problem: Safety - Medical Restraint  Goal: Remains free of injury from restraints (Restraint for Interference with Medical Device)  Description: INTERVENTIONS:  1. Determine that other, less

## 2025-02-05 NOTE — WOUND CARE
Coccyx with open area and non blanchable erythema surrounding area in center is Dark purple/ maroon and most consistent with DTI. Recommend foam dressing every other day, frequent turning and offloading. Diligent frequent incontinence care.     Left posterior thigh 2 red areas blanchable, small foam dressing every other day. Skin is intact, unknown etiology, unusual area for pressure injury.

## 2025-02-06 VITALS
HEIGHT: 74 IN | SYSTOLIC BLOOD PRESSURE: 94 MMHG | WEIGHT: 162 LBS | HEART RATE: 86 BPM | TEMPERATURE: 97.3 F | OXYGEN SATURATION: 98 % | RESPIRATION RATE: 17 BRPM | DIASTOLIC BLOOD PRESSURE: 61 MMHG | BODY MASS INDEX: 20.79 KG/M2

## 2025-02-06 LAB
ANION GAP SERPL CALC-SCNC: 12 MMOL/L (ref 7–16)
BASOPHILS # BLD: 0.07 K/UL (ref 0–0.2)
BASOPHILS NFR BLD: 0.9 % (ref 0–2)
BUN SERPL-MCNC: 28 MG/DL (ref 8–23)
CALCIUM SERPL-MCNC: 9.6 MG/DL (ref 8.8–10.2)
CHLORIDE SERPL-SCNC: 104 MMOL/L (ref 98–107)
CO2 SERPL-SCNC: 24 MMOL/L (ref 20–29)
CREAT SERPL-MCNC: 0.99 MG/DL (ref 0.8–1.3)
DIFFERENTIAL METHOD BLD: ABNORMAL
EOSINOPHIL # BLD: 0.14 K/UL (ref 0–0.8)
EOSINOPHIL NFR BLD: 1.9 % (ref 0.5–7.8)
ERYTHROCYTE [DISTWIDTH] IN BLOOD BY AUTOMATED COUNT: 13.9 % (ref 11.9–14.6)
GLUCOSE BLD STRIP.AUTO-MCNC: 107 MG/DL (ref 65–100)
GLUCOSE SERPL-MCNC: 124 MG/DL (ref 70–99)
HCT VFR BLD AUTO: 46.5 % (ref 41.1–50.3)
HGB BLD-MCNC: 16 G/DL (ref 13.6–17.2)
IMM GRANULOCYTES # BLD AUTO: 0.16 K/UL (ref 0–0.5)
IMM GRANULOCYTES NFR BLD AUTO: 2.1 % (ref 0–5)
LYMPHOCYTES # BLD: 1.87 K/UL (ref 0.5–4.6)
LYMPHOCYTES NFR BLD: 24.8 % (ref 13–44)
MCH RBC QN AUTO: 27.7 PG (ref 26.1–32.9)
MCHC RBC AUTO-ENTMCNC: 34.4 G/DL (ref 31.4–35)
MCV RBC AUTO: 80.6 FL (ref 82–102)
MONOCYTES # BLD: 0.71 K/UL (ref 0.1–1.3)
MONOCYTES NFR BLD: 9.4 % (ref 4–12)
NEUTS SEG # BLD: 4.59 K/UL (ref 1.7–8.2)
NEUTS SEG NFR BLD: 60.9 % (ref 43–78)
NRBC # BLD: 0 K/UL (ref 0–0.2)
PLATELET # BLD AUTO: 141 K/UL (ref 150–450)
PMV BLD AUTO: 11 FL (ref 9.4–12.3)
POTASSIUM SERPL-SCNC: 4.2 MMOL/L (ref 3.5–5.1)
RBC # BLD AUTO: 5.77 M/UL (ref 4.23–5.6)
SERVICE CMNT-IMP: ABNORMAL
SODIUM SERPL-SCNC: 140 MMOL/L (ref 136–145)
WBC # BLD AUTO: 7.5 K/UL (ref 4.3–11.1)

## 2025-02-06 PROCEDURE — 36415 COLL VENOUS BLD VENIPUNCTURE: CPT

## 2025-02-06 PROCEDURE — 85025 COMPLETE CBC W/AUTO DIFF WBC: CPT

## 2025-02-06 PROCEDURE — 80048 BASIC METABOLIC PNL TOTAL CA: CPT

## 2025-02-06 PROCEDURE — 6370000000 HC RX 637 (ALT 250 FOR IP): Performed by: PHYSICIAN ASSISTANT

## 2025-02-06 PROCEDURE — 2500000003 HC RX 250 WO HCPCS: Performed by: FAMILY MEDICINE

## 2025-02-06 PROCEDURE — 97535 SELF CARE MNGMENT TRAINING: CPT

## 2025-02-06 PROCEDURE — 6360000002 HC RX W HCPCS: Performed by: FAMILY MEDICINE

## 2025-02-06 PROCEDURE — 97530 THERAPEUTIC ACTIVITIES: CPT

## 2025-02-06 PROCEDURE — 82962 GLUCOSE BLOOD TEST: CPT

## 2025-02-06 PROCEDURE — 6370000000 HC RX 637 (ALT 250 FOR IP): Performed by: FAMILY MEDICINE

## 2025-02-06 RX ORDER — LEVETIRACETAM 500 MG/1
500 TABLET ORAL 2 TIMES DAILY
Qty: 60 TABLET | Refills: 0 | Status: SHIPPED | OUTPATIENT
Start: 2025-02-06

## 2025-02-06 RX ADMIN — CARVEDILOL 3.12 MG: 3.12 TABLET, FILM COATED ORAL at 08:40

## 2025-02-06 RX ADMIN — FINASTERIDE 5 MG: 5 TABLET, FILM COATED ORAL at 08:40

## 2025-02-06 RX ADMIN — LEVETIRACETAM 500 MG: 100 INJECTION INTRAVENOUS at 06:15

## 2025-02-06 RX ADMIN — EZETIMIBE 10 MG: 10 TABLET ORAL at 08:40

## 2025-02-06 RX ADMIN — SODIUM CHLORIDE, PRESERVATIVE FREE 10 ML: 5 INJECTION INTRAVENOUS at 08:42

## 2025-02-06 NOTE — DISCHARGE SUMMARY
Hospitalist Discharge Summary   Admit Date:  2025  1:58 PM   DC Note date: 2025  Name:  Polo Smith   Age:  89 y.o.  Sex:  male  :  10/9/1934   MRN:  [id number]   Room:  [facility]  PCP:  Renzo Gomez MD    Presenting Complaint: Fatigue     Initial Admission Diagnosis: Intracerebral hemorrhage, nontraumatic (HCC) [I61.9]     Problem List for this Hospitalization (present on admission):    Principal Problem:    Intracerebral hemorrhage, nontraumatic (HCC)  Active Problems:    Essential hypertension    Diabetes mellitus type 2, controlled (HCC)    Hyperlipidemia    BPH (benign prostatic hyperplasia)    Hypercoagulable state (HCC)    Atrial fibrillation, chronic (HCC)    Hypokalemia    UTI (urinary tract infection)  Resolved Problems:    * No resolved hospital problems. *      Hospital Course:  Polo Smith is a 89 y.o. male with a past medical history of atrial fibrillation on Xarelto, hyperlipidemia, BPH who presented to the hospital from [facility] due to increased lethargy. In the ER, labs showed ANGEL, elevated BNP at 2530, elevated troponin level at 38.0. UA showed small blood, 30 protein, moderate leukocyte, 2+ bacteria. Urine culture showed mixed janine.  Tested positive for Influenza A. EKG showed Atrial fibrillation.CT head showed  acute intracranial hemorrhage measuring approximately 2 cm anterior left periventricular region frontally with vasogenic edema and local regional mass effect on CT head. Patient admitted under hospitalist service for further management and evaluation. Neurosurgery consulted. Repeat CT head showed slightly more conspicuous hyperdense hemorrhage within the left frontal lobe when compared to the immediate prior examination. No significant mass effect. A third repeat CT head  showed stable hyperdense hemorrhages within the left frontal lobe. MRI brain was done and showed stable left frontal parenchymal hematomas with stable vasogenic edema and focal mass

## 2025-02-06 NOTE — PLAN OF CARE
Problem: Chronic Conditions and Co-morbidities  Goal: Patient's chronic conditions and co-morbidity symptoms are monitored and maintained or improved  Outcome: Progressing     Problem: Discharge Planning  Goal: Discharge to home or other facility with appropriate resources  Outcome: Progressing     Problem: Pain  Goal: Verbalizes/displays adequate comfort level or baseline comfort level  Outcome: Progressing     Problem: Safety - Adult  Goal: Free from fall injury  Outcome: Progressing     Problem: Safety - Medical Restraint  Goal: Remains free of injury from restraints (Restraint for Interference with Medical Device)  Outcome: Progressing     Problem: Skin/Tissue Integrity  Goal: Skin integrity remains intact  Outcome: Progressing     Problem: ABCDS Injury Assessment  Goal: Absence of physical injury  Outcome: Progressing

## 2025-02-06 NOTE — PROGRESS NOTES
Hospitalist Progress Note   Admit Date:  2025  1:58 PM   Name:  Polo Smith   Age:  89 y.o.  Sex:  male  :  10/9/1934   MRN:  [id number]   Room:  [facility]    Presenting/Chief Complaint: Fatigue     Reason(s) for Admission: Intracerebral hemorrhage, nontraumatic (HCC) [I61.9]     Hospital Course:   Polo Smith is a 89 y.o. male with a PMHx of atrial fibrillation on Xarelto, hyperlipidemia, BPH who presented to the hospital from [facility] with increased lethargy.  W/u revealed acute intracranial hemorrhage measuring approximately 2 cm anterior left periventricular region frontally with vasogenic edema and local regional mass effect on CT head.      Neurosurgery consulted and recommended MRI brain 2025 which revealed stable left frontal parenchymal hematomas with stable vasogenic edema and focal mass effect. Per Dr. Hartley, no surgical intervention warranted but will need follow-up as an outpatient with neurosurgery with repeat CT head.    During the stay in ICU his blood pressure would drop too low at times up to 70 systolic requiring bolus of fluids.  Patient Coreg dosage was decreased.  Patient stabilized and transferred to med surg bed.    Subjective & 24hr Events:   \"I'm just irritated today.\"  Awake, alert 89y.o. demented male sitting up in bed in NAD    No complaints; unable to fully review systems with this demented patient    Assessment & Plan:     Principal Problem:    Acute ICH  - s/p Balfaxar for Xarelto reversal  - cont Keppra for seizure ppx  - outpatient f/u with neuurosx and will need repeat CT head    Active Problems:    Acute Influenza A   - resolved; tamiflu course completed  - stable on RA       ANGEL - pre renal  - resolved  - encourage oral fluid intake      Hypokalemia  - resolved      Rule out acute cystitis  - ucx 2025 with mixed skin janine  - s/p rocephin course       HTN  - labile ranges; cont coreg 3.125mg bid  - cont holding ARB  - monitor 
                 Hospitalist Progress Note   Admit Date:  2025  1:58 PM   Name:  Polo Smith   Age:  89 y.o.  Sex:  male  :  10/9/1934   MRN:  [id number]   Room:  [facility]    Presenting/Chief Complaint: Fatigue     Reason(s) for Admission: Intracerebral hemorrhage, nontraumatic (HCC) [I61.9]     Hospital Course:   Polo Smith is a 89 y.o. male with a PMHx of atrial fibrillation on Xarelto, hyperlipidemia, BPH who presented to the hospital from [facility] with increased lethargy.  W/u revealed acute intracranial hemorrhage measuring approximately 2 cm anterior left periventricular region frontally with vasogenic edema and local regional mass effect on CT head.      Neurosurgery consulted and recommended MRI brain 2025 which revealed stable left frontal parenchymal hematomas with stable vasogenic edema and focal mass effect. Per Dr. Hartley, no surgical intervention warranted but will need follow-up as an outpatient with neurosurgery with repeat CT head.    During the stay in ICU his blood pressure would drop too low at times up to 70 systolic requiring bolus of fluids.  Patient Coreg dosage was decreased.  Patient stabilized and transferred to med surg bed.    Subjective & 24hr Events:   \"That's my son Andrés.\"  Awake, alert 89y.o. demented male sitting up in bed in NAD; son at bedside    No complaints; unable to fully review systems with this demented patient    Assessment & Plan:     Principal Problem:    Acute ICH  - s/p Balfaxar for Xarelto reversal  - cont Keppra for seizure ppx  - outpatient f/u with neuurosx and will need repeat CT head    Active Problems:    Acute Influenza A   - tamiflu. Supportive care.   - stable on RA       ANGEL - pre renal  - resolved  - encourage oral fluid intake      Hypokalemia  - resolved      Acute UTI  - ucx 2025 with mixed skin janine  - s/p rocephin course       HTN  - episodic hypotension  - decrease coreg dose further to 3.125mg bid  - cont holding 
       Hospitalist Progress Note   Admit Date:  2025  1:58 PM   Name:  Polo Smith   Age:  89 y.o.  Sex:  male  :  10/9/1934   MRN:  [id number]   Room:  [address] [place] [address]    Presenting/Chief Complaint: Fatigue     Reason(s) for Admission: Intracerebral hemorrhage, nontraumatic (HCC) [I61.9]     Hospital Course:   Polo Smith is a 89 y.o. male who presented from [facility] for increased lethargy. Patient is a poor historian, but states he has been feeling weak all over for the past day. Nothing seems to make better or worse. Denies obvious trauma.      CT head today showed - New acute intracranial hemorrhage measuring approximately 2 cm anterior left periventricular region frontally with vasogenic edema and local regional mass effect.     Recent discharge on 25 for stroke like symptoms. MRI brain contraindicated due to titanium rods in spine, on a dysphagia minced and moist diet, DNR, history significant for A-fib on Xarelto, hyperlipidemia, and BPH     Course during the stay  History of dementia, admitted for intracranial bleed, repeat  CT head stable bleed, neurosurgery evaluated recommended MRI brain-stable left frontal parenchymal  hematomas with stable vasogenic edema and focal mass effect. No definite acute  infarct on this markedly motion degraded study.  Discussed with Dr. Hartley neurosurgery on  no neurological intervention required, eventually follow-up as an outpatient with neurosurgery with repeat CT head.  During the stay in ICU his blood pressure would drop too low at times up to 70 systolic requiring bolus of fluids.  Patient Coreg dosage was decreased.    Subjective & 24hr Events:   Awake alert says that he knows in the hospital, does have baseline dementia  MRI brain-stable left frontal parenchymal hematomas with stable vasogenic edema and focal mass effect.  No intervention as per neurosurgery, require outpatient follow-up with CT head  Later on today also had an episode of low 
       Hospitalist Progress Note   Admit Date:  2025  1:58 PM   Name:  Polo Smith   Age:  89 y.o.  Sex:  male  :  10/9/1934   MRN:  [id number]   Room:  [address] [place] [address]    Presenting/Chief Complaint: Fatigue     Reason(s) for Admission: Intracerebral hemorrhage, nontraumatic (HCC) [I61.9]     Hospital Course:   Polo Smith is a 89 y.o. male who presented from [facility] for increased lethargy. Patient is a poor historian, but states he has been feeling weak all over for the past day. Nothing seems to make better or worse. Denies obvious trauma.      CT head today showed - New acute intracranial hemorrhage measuring approximately 2 cm anterior left periventricular region frontally with vasogenic edema and local regional mass effect.     Recent discharge on 25 for stroke like symptoms. MRI brain contraindicated due to titanium rods in spine, on a dysphagia minced and moist diet, DNR, history significant for A-fib on Xarelto, hyperlipidemia, and BPH     Subjective & 24hr Events:   Awake alert, not oriented in place, initially felt he was at home  Patient speech was normal, he would verbalize appropriately,stops talking for couple of minutes and then start talking, did speak to son later on today who explained that patient does have dementia and this pattern is normal for him.  Discussed with neurosurgery, recommending MRI brain-concern for amyloid  Repeat CT head stable bleed  Potassium 3.4, creatinine improving, 1.21  UA with a WBC 20-50, moderate amount of leukocyte esterase, ordered urine culture started on Rocephin  Later on today blood pressure, did drop to a low of 69/39 requiring 500 cc of normal saline bolus with improvement in blood pressure    This visit took 30 minutes  of critical care time in evaluation and management of a criticially ill patient, such that the critical illness acutely impairs cardiovascular/pulmonary systems such that there is a high probability of imminent of 
  Physician Progress Note      PATIENT:               QUINCY UGARTE  [facility] #:                  [id number]  :                       10/9/1934  ADMIT DATE:       2025 1:58 PM  DISCH DATE:  RESPONDING  PROVIDER #:        Nir NOYOLA          QUERY TEXT:    Patient admitted with increased lethargy. Noted documentation of UTI in IM PN   on  with Urine culture 50,000-100,000 COLONIES/mL MIXED SKIN JANINE   ISOLATED. In order to support the diagnosis of UTI, please include additional   clinical indicators in your documentation. ?Or please document if the   diagnosis of UTI has been ruled out after further study    The medical record reflects the following:  Risk Factors: HTN, 89 year old male, DM2, BPH    Clinical Indicators: IM PN  \" UTI: -ordered urine culture, start   patient on Rocephin 1 g IV daily. \"    IM PN  \" Acute UTI- ucx 2025 with mixed skin janine. s/p rocephin   course \"    On  Urine culture - 50,000-100,000 COLONIES/mL MIXED SKIN JANINE ISOLATED    Urine Analysis -   Urine Bacteria - 2+  Nitrate UR - Negative  Leukocyte Esterase UR - Moderate  Urine WBC- 20-50    Treatment: Urine culture, IV Rocephin, 0.9 % sodium chloride infusion    Thank Tha henderson CDS  Options provided:  -- UTI present as evidenced by, Please document evidence.  -- UTI was ruled out  -- Other - I will add my own diagnosis  -- Disagree - Not applicable / Not valid  -- Disagree - Clinically unable to determine / Unknown  -- Refer to Clinical Documentation Reviewer    PROVIDER RESPONSE TEXT:    UTI was ruled out after study.    Query created by: Tha Valdes on 2025 2:55 AM      Electronically signed by:  Nir NOYOLA 2025 11:58 AM          
4 Eyes Skin Assessment     NAME:  Polo Smith  YOB: 1934  MEDICAL RECORD NUMBER:  [id number]    The patient is being assessed for  Admission    I agree that at least one RN has performed a thorough Head to Toe Skin Assessment on the patient. ALL assessment sites listed below have been assessed.      Areas assessed by both nurses:    Head, Face, Ears, Shoulders, Back, Chest, Arms, Elbows, Hands, Sacrum. Buttock, Coccyx, Ischium, Legs. Feet and Heels, and Under Medical Devices         Does the Patient have a Wound? No noted wound(s)     Scattered ecchymosis noted on bilateral arms, no redness/breakdown noted on sacrum. Opti-view placed over sacrum       Jese Prevention initiated by RN: Yes  Wound Care Orders initiated by RN: No    Pressure Injury (Stage 3,4, Unstageable, DTI, NWPT, and Complex wounds) if present, place Wound referral order by RN under : No    New Ostomies, if present place, Ostomy referral order under : No     Nurse 1 eSignature: Electronically signed by Trang Yu RN on 1/30/25 at 9:54 PM EST    **SHARE this note so that the co-signing nurse can place an eSignature**    Nurse 2 eSignature: Electronically signed by Romana Barrera RN on 1/30/25 at 9:57 PM EST    
4 Eyes Skin Assessment     NAME:  Polo Smith  YOB: 1934  MEDICAL RECORD NUMBER:  [id number]    The patient is being assessed for  Admission    I agree that at least one RN has performed a thorough Head to Toe Skin Assessment on the patient. ALL assessment sites listed below have been assessed.      Areas assessed by both nurses:    Sacrum. Buttock, Coccyx, Ischium        Does the Patient have a Wound? Yes wound(s) were present on assessment. LDA wound assessment was Initiated and completed by RN       Jese Prevention initiated by RN: Yes  Wound Care Orders initiated by RN: No    Pressure Injury (Stage 3,4, Unstageable, DTI, NWPT, and Complex wounds) if present, place Wound referral order by RN under : No    New Ostomies, if present place, Ostomy referral order under : No     Nurse 1 eSignature: Electronically signed by STEPHEN FIGUEROA RN on 2/2/25 at 7:27 PM EST    **SHARE this note so that the co-signing nurse can place an eSignature**    Nurse 2 eSignature: {Esignature:[id number]}    
ACUTE OCCUPATIONAL THERAPY GOALS:   (Developed with and agreed upon by patient and/or caregiver.)  1. Patient will complete upper body bathing and dressing with minimal assistance and adaptive equipment as needed.   2. Patient will complete toileting with moderate assistance.   3. Patient will tolerate 30 minutes of OT treatment with 2-3 rest breaks to increase activity tolerance for ADLs.   4. Patient will complete sitting balance edge of bed with supervision while participating in ADL.   5. Patient will complete functional transfers with minimal assistance with adaptive equipment as needed.   6. Patient will complete grooming tasks with CGA to improve participation with self-care.      Timeframe: 7 visits    OCCUPATIONAL THERAPY: Daily Note AM   OT Visit Days: 3   Time In/Out  OT Charge Capture  Rehab Caseload Tracker  OT Orders    Polo Smith is a 89 y.o. male   PRIMARY DIAGNOSIS: Intracerebral hemorrhage, nontraumatic (HCC)  Intracerebral hemorrhage, nontraumatic (HCC) [I61.9]       Inpatient: Payor: MEDICARE / Plan: MEDICARE PART A AND B / Product Type: *No Product type* /     ASSESSMENT:     REHAB RECOMMENDATIONS:   Recommendation to date pending progress:  Setting:  Short-term Rehab    Equipment:    To Be Determined     ASSESSMENT:  Mr. Smith is not doing as well today. Pt presents supine upon arrival. Pt required min A for bed mobility. Fair sitting balance noted at edge of bed. Pt was able to stand with min A x2. Pt performed mobility around the edge of bed.Sat on bedside commode for an attempt to have BM. Pt had near syncope episode while seated on bedside commode. RN aware and in room. Made minimal progress with goals this session. Left with all needs in reach. Will continue to benefit from skilled OT during stay.       SUBJECTIVE:     Mr. Smith states, \"My back hurts\"     Social/Functional Lives With: Spouse  Type of Home: House  Home Layout: One level  Home Access: Stairs to enter without 
ACUTE OCCUPATIONAL THERAPY GOALS:   (Developed with and agreed upon by patient and/or caregiver.)  1. Patient will complete upper body bathing and dressing with minimal assistance and adaptive equipment as needed.   2. Patient will complete toileting with moderate assistance.   3. Patient will tolerate 30 minutes of OT treatment with 2-3 rest breaks to increase activity tolerance for ADLs.   4. Patient will complete sitting balance edge of bed with supervision while participating in ADL.   5. Patient will complete functional transfers with minimal assistance with adaptive equipment as needed.   6. Patient will complete grooming tasks with CGA to improve participation with self-care.      Timeframe: 7 visits    OCCUPATIONAL THERAPY: Daily Note AM   OT Visit Days: 4   Time In/Out  OT Charge Capture  Rehab Caseload Tracker  OT Orders    Polo Smith is a 89 y.o. male   PRIMARY DIAGNOSIS: Intracerebral hemorrhage, nontraumatic (HCC)  Intracerebral hemorrhage, nontraumatic (HCC) [I61.9]       Inpatient: Payor: MEDICARE / Plan: MEDICARE PART A AND B / Product Type: *No Product type* /     ASSESSMENT:     REHAB RECOMMENDATIONS:   Recommendation to date pending progress:  Setting:  Short-term Rehab    Equipment:    To Be Determined     ASSESSMENT:  Mr. Smith presents with decreased activity tolerance, decreased independence with mobility, weakness, and orthostatic hypotension during mobility. Pt's vitals were taken throughout mobility and documented in vitals section of this note. Pt completed bed mobility with min a x 2 and completed grooming and hygiene activities while seated edge of bed. Pt's final BP reading in sitting was too low to complete standing, although pt was not symptomatic, pt had a near syncope episode during yesterday's session per chart review. Pt was assisted back to supine with min a with alarm on and belongings in reach. Good participation despite above. Continue POC.       SUBJECTIVE:      
ACUTE PHYSICAL THERAPY GOALS:   (Developed with and agreed upon by patient and/or caregiver.)  (1.) Polo Smith  will move from supine to sit and sit to supine  with MINIMAL ASSIST within 7 treatment day(s).    (2.) Polo Smith will transfer from bed to chair and chair to bed with MINIMAL ASSIST using the least restrictive device within 7 treatment day(s).    (3.) Polo Smith will ambulate with MINIMAL ASSIST for 100 feet with the least restrictive device within 7 treatment day(s).   (4.) Polo Smith will perform standing static and dynamic balance activities x 10 minutes with MINIMAL ASSIST to improve safety within 7 treatment day(s).  (5.) Polo Smith will perform therapeutic exercises x 10 min for HEP with CONTACT GUARD ASSIST to improve strength, endurance, and functional mobility within 7 treatment day(s)    PHYSICAL THERAPY: Daily Note AM   (Link to Caseload Tracking: PT Visit Days : 2  Time In/Out PT Charge Capture  Rehab Caseload Tracker  Orders    Polo Smith is a 89 y.o. male   PRIMARY DIAGNOSIS: Intracerebral hemorrhage, nontraumatic (HCC)  Intracerebral hemorrhage, nontraumatic (HCC) [I61.9]       Inpatient: Payor: MEDICARE / Plan: MEDICARE PART A AND B / Product Type: *No Product type* /     ASSESSMENT:     REHAB RECOMMENDATIONS:   Recommendation to date pending progress:  Setting:  Short-term Rehab    Equipment:    To Be Determined     ASSESSMENT:  Mr. Smith presents supine, found to have pulled out IV in hand, RN notified and in room to address. Today pt required Min A with sup to sit secondary to decreased strength. Pt then required Min/Mod A x2 with STS to RW and was able to transfer to chair with Min A for balance. After seated rest break, pt then able to perform STS and amb in room 10' with RW with Min A for balance, chair follow for safety, pt limited secondary to fatigue. Pt assisted back up into chair, assisted with positioning in chair, alarm activated, progress today, 
ACUTE PHYSICAL THERAPY GOALS:   (Developed with and agreed upon by patient and/or caregiver.)  (1.) Polo Smith  will move from supine to sit and sit to supine  with MINIMAL ASSIST within 7 treatment day(s).    (2.) Polo Smith will transfer from bed to chair and chair to bed with MINIMAL ASSIST using the least restrictive device within 7 treatment day(s).    (3.) Polo Smith will ambulate with MINIMAL ASSIST for 100 feet with the least restrictive device within 7 treatment day(s).   (4.) Polo Smith will perform standing static and dynamic balance activities x 10 minutes with MINIMAL ASSIST to improve safety within 7 treatment day(s).  (5.) Polo Smith will perform therapeutic exercises x 10 min for HEP with CONTACT GUARD ASSIST to improve strength, endurance, and functional mobility within 7 treatment day(s)    PHYSICAL THERAPY: Daily Note AM   (Link to Caseload Tracking: PT Visit Days : 3  Time In/Out PT Charge Capture  Rehab Caseload Tracker  Orders    Polo Smith is a 89 y.o. male   PRIMARY DIAGNOSIS: Intracerebral hemorrhage, nontraumatic (HCC)  Intracerebral hemorrhage, nontraumatic (HCC) [I61.9]       Inpatient: Payor: MEDICARE / Plan: MEDICARE PART A AND B / Product Type: *No Product type* /     ASSESSMENT:     REHAB RECOMMENDATIONS:   Recommendation to date pending progress:  Setting:  Short-term Rehab    Equipment:    To Be Determined     ASSESSMENT:  Mr. Smith presents supine sleeping upon arrival with spoon in hand but breakfast untouched. Upon wakening patient is agreeable to PT/OT session. He completes sup>sit minAx2, with EOB sitting balance training x ~2-3 min. He next completes STS minAx2 with RW and gait belt and is able to amb to recliner x 10 ft Alejandra. Patient reports wanting to try to have a BM, transfers to BSC minAx2 with RW. Patient spent ~15 min seated on BSC attempting, with no success. After STS for hygiene patient tried to have a BM one last time but then had near 
ACUTE PHYSICAL THERAPY GOALS:   (Developed with and agreed upon by patient and/or caregiver.)  (1.) Polo Smith  will move from supine to sit and sit to supine  with MINIMAL ASSIST within 7 treatment day(s).    (2.) Polo Smith will transfer from bed to chair and chair to bed with MINIMAL ASSIST using the least restrictive device within 7 treatment day(s).    (3.) Polo Smith will ambulate with MINIMAL ASSIST for 100 feet with the least restrictive device within 7 treatment day(s).   (4.) Polo Smith will perform standing static and dynamic balance activities x 10 minutes with MINIMAL ASSIST to improve safety within 7 treatment day(s).  (5.) Polo Smith will perform therapeutic exercises x 10 min for HEP with CONTACT GUARD ASSIST to improve strength, endurance, and functional mobility within 7 treatment day(s)    PHYSICAL THERAPY: Daily Note AM   (Link to Caseload Tracking: PT Visit Days : 4  Time In/Out PT Charge Capture  Rehab Caseload Tracker  Orders    Polo Smith is a 89 y.o. male   PRIMARY DIAGNOSIS: Intracerebral hemorrhage, nontraumatic (HCC)  Intracerebral hemorrhage, nontraumatic (HCC) [I61.9]       Inpatient: Payor: MEDICARE / Plan: MEDICARE PART A AND B / Product Type: *No Product type* /     ASSESSMENT:     REHAB RECOMMENDATIONS:   Recommendation to date pending progress:  Setting:  Short-term Rehab    Equipment:    To Be Determined     ASSESSMENT:  Mr. Smith presents supine sleeping upon arrival, upon wakening patient is agreeable to PT/OT session. Due to previous session attempted orthostatic Bps. In supine /72, completes sup>sit Alejandra immediately sitting upright patient declines symptoms but BP 91/66. After about 5 min sitting EOB SBA/CGA completing ADLs with OT patient began to report light headedness, new BP 74/54. Patient returned to bed at this time with lateral scooting and sit>sup minAx2.  RN notified , will continue to follow and progress as tolerated.  
Bedside and verbal shift change report received from  Emi LOWE (offgoing nurse). Report included the following information SBAR, Kardex, Intake/Output, MAR, Recent Results, Med Rec Status, Cardiac Rhythm afib, Alarm Parameters , and Dual Neuro Assessment.                                        
Bedside and verbal shift change report received from  Nikhil LOWE (offgoing nurse). Report included the following information SBAR, Kardex, Intake/Output, MAR, Recent Results, Med Rec Status, Cardiac Rhythm afib, Alarm Parameters , and Dual Neuro Assessment.                                          
GOALS:  LTG: Patient will tolerate least restrictive diet without overt signs or symptoms of airway compromise. PROGRESSING 2/3/25  STG: Patient will tolerate soft and bite sized diet and thin liquids without overt signs or symptoms of airway compromise.   STG: Patient will participate in modified barium swallow study as clinically indicated.      LTG: Patient will improve cognitive-communicative function to participate in daily activities at highest possible level.   STG: Patient will answer open ended questions related to self/status with 50% accuracy with min A.   STG: Patient will follow verbally issued one step commands with 50% accuracy with min A.    SPEECH LANGUAGE PATHOLOGY: Dysphagia Daily Note #1    Acknowledge Order  I  Therapy Time  I   Charges     I  Rehab Caseload Tracker    NAME: Polo Smith  : 10/9/1934  MRN: [id number]    ADMISSION DATE: 2025  PRIMARY DIAGNOSIS: Intracerebral hemorrhage, nontraumatic (HCC)    ICD-10: Treatment Diagnosis: R13.12 Dysphagia, Oropharyngeal Phase  R41.841 Cognitive-Communication Deficit  F80.2 Mixed Receptive-Expressive Language Disorder    RECOMMENDATIONS   Diet:    Soft and Bite-Sized  Thin Liquids    Medication: whole floated in puree or applesauce   Compensatory Swallowing Strategies:   Small bites/sips  Upright as possible for all oral intake   Therapeutic Intervention:   Patient/family education  Dysphagia treatment  Cognitive-linguistic treatment   Patient continues to require skilled intervention:  Yes. Recommend ongoing speech therapy services during this hospitalization.     Anticipated Discharge Needs: Ongoing speech therapy is recommended at next level of care.      ASSESSMENT    Patient with mild oral dysphagia exacerbated by impulsivity with self feeding. Mildly prolonged oral phase. Pharyngeal phase unremarkable as can be assessed at bedside.     Recommend continue soft and bite sized / thin liquid, medications whole floated in applesauce. 
GOALS:  LTG: Patient will tolerate least restrictive diet without overt signs or symptoms of airway compromise. PROGRESSING 2/3/25  STG: Patient will tolerate soft and bite sized diet and thin liquids without overt signs or symptoms of airway compromise.   STG: Patient will participate in modified barium swallow study as clinically indicated.      LTG: Patient will improve cognitive-communicative function to participate in daily activities at highest possible level.   STG: Patient will answer open ended questions related to self/status with 50% accuracy with min A.   STG: Patient will follow verbally issued one step commands with 50% accuracy with min A.    SPEECH LANGUAGE PATHOLOGY: Dysphagia Daily Note #2    Acknowledge Order  I  Therapy Time  I   Charges     I  Rehab Caseload Tracker    NAME: Polo Smith  : 10/9/1934  MRN: [id number]    ADMISSION DATE: 2025  PRIMARY DIAGNOSIS: Intracerebral hemorrhage, nontraumatic (HCC)    ICD-10: Treatment Diagnosis: R13.12 Dysphagia, Oropharyngeal Phase  R41.841 Cognitive-Communication Deficit  F80.2 Mixed Receptive-Expressive Language Disorder    RECOMMENDATIONS   Diet:    Soft and Bite-Sized  Thin Liquids    Medication: whole floated in puree or applesauce   Compensatory Swallowing Strategies:   Small bites/sips  Upright as possible for all oral intake   Therapeutic Intervention:   Patient/family education  Dysphagia treatment  Cognitive-linguistic treatment   Patient continues to require skilled intervention:  Yes. Recommend ongoing speech therapy services during this hospitalization.     Anticipated Discharge Needs: Ongoing speech therapy is recommended at next level of care.      ASSESSMENT    Patient with mild oral dysphagia exacerbated by mentation. This date patient with increased difficulty self feeding, attempting to bite empty hand at times. Mildly prolonged mastication, adequate oral clearance, no overt indications of airway compromise with all trials. 
Neurosurgery Progress Note      Subjective    Mr. Smith is hospital day 2 for intracerebral hemorrhage.  He is overall doing quite well.  Nurses state the family states he is essentially at his baseline.  He has been eating well.        Objective    Neurologic Exam   Physical Exam     He is awake alert answering questions appropriately neurologically appears to be quite stable    Assessment & Plan    His MRI demonstrates significant encephalomalacia from previous ischemic events.  There is a hemorrhage in the frontal lobe as was seen on the CT.  There is significant motion artifact but I see nothing that makes me concerned that this is not simply a simple intraparenchymal hemorrhage.  Discussed with the hospitalist that I am fine with him being transferred to the floor.  And as long as he is doing well I have no need for follow-up imaging now but would like follow-up with neurosurgery with an image in 2 weeks.    On 02/01/25 I have spent 25 minutes reviewing previous notes, test results and face to face with the patient ( and any present family or support) discussing the diagnosis and plan. I have all answered questions to their satisfaction.     This note was created using voice recognition software.  All attempts were made to recognize and correct voice recognition errors. Unfortunately some errors may persist.    RADHA Hartley MD FAANS    
Patient's family given a Patient Stroke Education book.  Patient's family educated on signs and symptoms of stroke and importance of getting to nearest ED as soon as symptoms occur.  Patient's family educated on risk factors of stroke.      
RN called to bedside by PT. Found PT with patient who was sitting on commode, eyes closed and blood pressure 65/57. Patient was returned to bed and placed in trendelenburg; vital signs subsequently stabilized. Hospitalist notified; see new orders.   
Report given to Deanna LOWE.   
Report received from Laura LOWE. Dual neuro assessment performed.   
Stroke Education provided to patient and the following topics were discussed    1. Patients personal risk factors for stroke are atrial fibrillation, diabetes mellitus, hyperlipidemia, and hypertension    2. Warning signs of Stroke:        * Sudden numbness or weakness of the face, arm or leg, especially on one side of          The body            * Sudden confusion, trouble speaking or understanding        * Sudden trouble seeing in one or both eyes        * Sudden trouble walking, dizziness, loss of balance or coordination        * Sudden severe headache with no known cause      3. Importance of activation Emergency Medical Services ( 9-1-1 ) immediately if experience any warning signs of stroke.    4. Be sure and schedule a follow-up appointment with your primary care doctor or any specialists as instructed.     5. You must take medicine every day to treat your risk factors for stroke.  Be sure to take your medicines exactly as your doctor tells you: no more, no less.  Know what your medicines are for , what they do.  Anti-thrombotics /anticoagulants can help prevent strokes.  You are taking the following medicine(s)  Xarelto, Coreg, Keppra, Tamiflu.     6.  Smoking and second-hand smoke greatly increase your risk of stroke, cardiovascular disease and death. Smoking history never    7. Information provided was [facility] Stroke Education Binder    8. Documentation of teaching completed in Patient Education Activity and on Care Plan with teaching response noted?  yes   
TRANSFER - OUT REPORT:    Verbal report given to CHRISSY Ferrell on Polo Smith  being transferred to [facility] for routine progression of patient care       Report consisted of patient's Situation, Background, Assessment and   Recommendations(SBAR).     Information from the following report(s) Nurse Handoff Report, Index, ED Encounter Summary, ED SBAR, Adult Overview, Intake/Output, MAR, Recent Results, Cardiac Rhythm Afib/Aflutter, and Neuro Assessment was reviewed with the receiving nurse.           Lines:   Peripheral IV 01/30/25 Left Forearm (Active)   Site Assessment Clean, dry & intact 02/02/25 0815   Line Status Flushed;Normal saline locked;Capped 02/02/25 0815   Line Care Connections checked and tightened 02/02/25 0815   Phlebitis Assessment No symptoms 02/02/25 0815   Infiltration Assessment 0 02/02/25 0815   Alcohol Cap Used Yes 02/02/25 0815   Dressing Status Clean, dry & intact 02/02/25 0815   Dressing Type Transparent 02/02/25 0815       Peripheral IV 02/01/25 Right Wrist (Active)   Site Assessment Clean, dry & intact 02/02/25 0815   Line Status Flushed;Normal saline locked;Capped 02/02/25 0815   Line Care Connections checked and tightened 02/02/25 0815   Phlebitis Assessment No symptoms 02/02/25 0815   Infiltration Assessment 0 02/02/25 0815   Alcohol Cap Used Yes 02/02/25 0815   Dressing Status Clean, dry & intact 02/02/25 0815   Dressing Type Transparent 02/02/25 0815   Dressing Intervention New 02/01/25 1742        Opportunity for questions and clarification was provided.      Patient transported with:  Monitor and Registered Nurse  Dual neuro check at bedside with Mariaelena Primary RN receiving pt.  NIH 2 and unchanged.          
TRANSFER - OUT REPORT:    Verbal report given to [facility] on Polo Smith  being transferred to [facility] for routine progression of patient care       Report consisted of patient's Situation, Background, Assessment and   Recommendations(SBAR).     Information from the following report(s) ED SBAR, Adult Overview, and Recent Results was reviewed with the receiving nurse.           Lines:   Peripheral IV 02/04/25 Left;Posterior Forearm (Active)   Site Assessment Clean, dry & intact 02/06/25 0810   Line Status Normal saline locked;Flushed;Capped 02/05/25 2036   Line Care Connections checked and tightened;Ports disinfected;Cap changed 02/05/25 2036   Phlebitis Assessment No symptoms 02/06/25 0810   Infiltration Assessment 0 02/06/25 0810   Alcohol Cap Used Yes 02/06/25 0810   Dressing Status Clean, dry & intact 02/06/25 0810   Dressing Type Transparent 02/06/25 0810        Opportunity for questions and clarification was provided.      Patient transported with:  Tech     transport  
    Cognitive- Communicative: Patient does exhibit deficits with alertness/attention, insight. Expressive and receptive language deficits suspect though assessment limited due to patient's reduced alertness and altered mental status.     Speech therapy will treat for dysphagia, cognitive- communicative function during acute phase of care.     GENERAL    Subjective: Patient resting upon entry. Able to achieve wakeful state when engaged.     Reason for Consult: brain bleed protocol     History of Present Injury/Illness: Mr. Smith  has no past medical history on file.. He also  has no past surgical history on file.  Precautions/Allergies: Statins     Observations: Patient resting though able to achieve wakeful state.     Prior Dysphagia History: Seen by  services on 1/27/2025 with recommendation for minced and moist diet with thin liquids.   Prior Instrumental Assessment: N/A  Prior Speech Therapy: Seen earlier this month during admission for neuro work up.     Current Diet:  Soft and Bite-Sized  Thin Liquids    Respiratory Status: Room air    Pain:  Patient does not c/o pain  Pain intervention: None- No pain observed  Pain response: Patient satisfied    OBJECTIVE    Dysphagia:   Oral Motor: Limited assessment. Patient's face is symmetrical at rest and during spontaneous retraction. Tongue is symmetrical at rest. Dentition is in good condition. Voice is unremarkable. Limited speech during session though intelligible when produced.   PO trials: Patient able to consume 4 oz of thin liquids, purees x's 2, ~25% of soft and bite sized meal tray. Clinician assisted with all PO trials and verbal cues required for patient to participate in therapy tasks. Oral phase of swallow required increased time for prep/transfer of PO trials. Pharyngeal phase of swallow was unremarkable as able to be assessed at bedside.     Cognitive- Communicative: Patient resting upon entry though able to achieve wakeful state once engaged. 
and needed to be cleaned with patient working on some prolonged standing for hygiene with maximal/total assistance. Pt required rest break but upon 2nd attempt to stand patient noted to be unresponsive with nurse called to the room. Pt reclined back into the chair and the was assisted back to the bed with sheet transfer via assist x 4. Pt become more responsive once supine in the bed and was assisted with further rolling for hygiene. Pt left with nurse at bedside and all needs bedside. Post-activity BP varying but last reading was 98/45. Pt is currently limited by deficits in cognition, strength, activity tolerance, and balance impacting independence and safety with ADL/functional transfers. Pt will benefit from OT services to address stated goals and plan of care.      [facility] AM-PAC™ “6 Clicks” Daily Activity Inpatient Short Form:    AM-PAC Daily Activity - Inpatient   How much help is needed for putting on and taking off regular lower body clothing?: Total  How much help is needed for bathing (which includes washing, rinsing, drying)?: A Lot  How much help is needed for toileting (which includes using toilet, bedpan, or urinal)?: Total  How much help is needed for putting on and taking off regular upper body clothing?: A Lot  How much help is needed for taking care of personal grooming?: A Lot  How much help for eating meals?: A Lot  AM-PAC Inpatient Daily Activity Raw Score: 10  AM-PAC Inpatient ADL T-Scale Score : 27.31  ADL Inpatient CMS 0-100% Score: 74.7  ADL Inpatient CMS G-Code Modifier : CL           SUBJECTIVE:     Mr. Smith states, \"I am?\" (Response when told he was in the hospital)    Social/Functional Additional Comments: pt unable to state at this time, per chart pt from [facility]    OBJECTIVE:     LINES / DRAINS / AIRWAY: External Catheter, IV, and ICU monitoring     RESTRICTIONS/PRECAUTIONS:  Restrictions/Precautions: Isolation, Fall Risk    PAIN: VITALS / O2:   Pre Treatment: 
    RESTRICTIONS/PRECAUTIONS:  Restrictions/Precautions: Isolation, Fall Risk                 GROSS EVALUATION:  Intact Impaired (Comments):   AROM []     PROM []    Strength []  Difficult to assess secondary to dec cognition   Balance [] Sitting - Static: Fair  Sitting - Dynamic: Fair  Standing - Static: Fair, -  Standing - Dynamic: Fair, -   Posture [] Forward Head  Rounded Shoulders  Trunk Flexion   Sensation []     Coordination []      Tone []     Edema []    Activity Tolerance [] Patient limited by fatigue, Treatment limited secondary to decreased cognition    []      COGNITION/  PERCEPTION: Intact Impaired (Comments):   Orientation []     Vision []     Hearing []     Cognition  []       MOBILITY: I Mod I S SBA CGA Min Mod Max Total  NT x2 Comments:   Bed Mobility    Rolling [] [] [] [] [] [] [] [x] [] [] [x]    Supine to Sit [] [] [] [] [] [] [x] [x] [] [] [x]    Scooting [] [] [] [] [] [] [x] [x] [] [] [x]    Sit to Supine [] [] [] [] [] [] [] [] [] [] []    Transfers    Sit to Stand [] [] [] [] [] [] [x] [x] [] [] [x]    Bed to Chair [] [] [] [] [] [] [x] [x] [] [] [x]    Stand to Sit [] [] [] [] [] [] [x] [x] [] [] [x]     [] [] [] [] [] [] [] [] [] [] []    I=Independent, Mod I=Modified Independent, S=Supervision, SBA=Standby Assistance, CGA=Contact Guard Assistance,   Min=Minimal Assistance, Mod=Moderate Assistance, Max=Maximal Assistance, Total=Total Assistance, NT=Not Tested    GAIT: I Mod I S SBA CGA Min Mod Max Total  NT x2 Comments:   Level of Assistance [] [] [] [] [] [] [x] [x] [] [] [x]    Distance 2  feet    DME HHA    Gait Quality Decreased arthur , Decreased step length, Shuffling , and Trunk flexion    Weightbearing Status Restrictions/Precautions  Restrictions/Precautions: Isolation, Fall Risk    Stairs      I=Independent, Mod I=Modified Independent, S=Supervision, SBA=Standby Assistance, CGA=Contact Guard Assistance,   Min=Minimal Assistance, Mod=Moderate Assistance, Max=Maximal Assistance, 
SBA=Standby Assistance, CGA=Contact Guard Assistance, Min=Minimal Assistance, Mod=Moderate Assistance, Max=Maximal Assistance, Total=Total Assistance, NT=Not Tested    BALANCE: Good Fair+ Fair Fair- Poor NT Comments   Sitting Static [] [] [x] [] [] []    Sitting Dynamic [] [] [x] [] [] []              Standing Static [] [] [x] [] [] []    Standing Dynamic [] [] [] [x] [] []        PLAN:     FREQUENCY/DURATION   OT Plan of Care: 3 times/week for duration of hospital stay or until stated goals are met, whichever comes first.    TREATMENT:     TREATMENT:   Co-Treatment between OT and PT necessary due to patient's decreased overall endurance/tolerance levels, as well as need for high level skilled assistance to complete functional transfers/mobility and functional tasks  Neuromuscular Re-education (27 Minutes): Patient participated in neuromuscular re-education including functional reaching, weight shifting, postural training, midline training, standing tolerance activity , and sitting balance activity   with minimal and moderate assistance, verbal cues, and tactile cues to improve sitting balance, standing balance, posture, coordination, static balance, and dynamic balance in order to prepare for functional task, prepare for seated ADLs, prepare for standing ADLs, prepare for functional transfer, and increase safety awareness.     TREATMENT GRID:  N/A    AFTER TREATMENT PRECAUTIONS: Alarm Activated, Bed/Chair Locked, Call light within reach, Chair, and Needs within reach    INTERDISCIPLINARY COLLABORATION:  RN/ PCT, PT/ PTA, and OT/ MONTIEL    EDUCATION:       TOTAL TREATMENT DURATION AND TIME:  Time In: 0927  Time Out: 0954  Minutes: 27    BIBI Gu              
mg SubCUTAneous PRN    dextrose 10 % infusion   IntraVENous Continuous PRN    insulin lispro (HUMALOG,ADMELOG) injection vial 0-4 Units  0-4 Units SubCUTAneous 4x Daily AC & HS    albuterol (PROVENTIL) nebulizer solution 2.5 mg  2.5 mg Nebulization Q6H PRN       Signed:  JAZMÍN Murrell      
8.8 - 10.2 MG/DL   CBC    Collection Time: 02/01/25  4:12 AM   Result Value Ref Range    WBC 5.1 4.3 - 11.1 K/uL    RBC 5.02 4.23 - 5.6 M/uL    Hemoglobin 14.1 13.6 - 17.2 g/dL    Hematocrit 42.8 41.1 - 50.3 %    MCV 85.3 82 - 102 FL    MCH 28.1 26.1 - 32.9 PG    MCHC 32.9 31.4 - 35.0 g/dL    RDW 14.0 11.9 - 14.6 %    Platelets 153 150 - 450 K/uL    MPV 10.5 9.4 - 12.3 FL    nRBC 0.00 0.0 - 0.2 K/uL   Magnesium    Collection Time: 02/01/25  4:12 AM   Result Value Ref Range    Magnesium 2.2 1.8 - 2.4 mg/dL   POCT Glucose    Collection Time: 02/01/25  5:36 AM   Result Value Ref Range    POC Glucose 105 (H) 65 - 100 mg/dL    Performed by: Brendan    POCT Glucose    Collection Time: 02/01/25 11:31 AM   Result Value Ref Range    POC Glucose 110 (H) 65 - 100 mg/dL    Performed by: Prince    POCT Glucose    Collection Time: 02/01/25  4:15 PM   Result Value Ref Range    POC Glucose 105 (H) 65 - 100 mg/dL    Performed by: Prince    POCT Glucose    Collection Time: 02/01/25  9:00 PM   Result Value Ref Range    POC Glucose 109 (H) 65 - 100 mg/dL    Performed by: Thaddeus    Basic metabolic panel    Collection Time: 02/02/25  4:11 AM   Result Value Ref Range    Sodium 139 136 - 145 mmol/L    Potassium 3.9 3.5 - 5.1 mmol/L    Chloride 104 98 - 107 mmol/L    CO2 23 20 - 29 mmol/L    Anion Gap 12 7 - 16 mmol/L    Glucose 99 70 - 99 mg/dL    BUN 31 (H) 8 - 23 MG/DL    Creatinine 1.12 0.80 - 1.30 MG/DL    Est, Glom Filt Rate 63 >60 ml/min/1.73m2    Calcium 9.4 8.8 - 10.2 MG/DL   CBC    Collection Time: 02/02/25  4:11 AM   Result Value Ref Range    WBC 4.9 4.3 - 11.1 K/uL    RBC 5.53 4.23 - 5.6 M/uL    Hemoglobin 15.6 13.6 - 17.2 g/dL    Hematocrit 46.9 41.1 - 50.3 %    MCV 84.8 82 - 102 FL    MCH 28.2 26.1 - 32.9 PG    MCHC 33.3 31.4 - 35.0 g/dL    RDW 13.9 11.9 - 14.6 %    Platelets 138 (L) 150 - 450 K/uL    MPV 10.4 9.4 - 12.3 FL    nRBC 0.00 0.0 - 0.2 K/uL   Cortisol AM, Total    Collection Time:

## 2025-02-06 NOTE — CARE COORDINATION
Patient is medically ready for discharge per attending. [facility] can accept patient today. Patient will discharge at 14:30 to room 214B via [facility] transport; reference number [id number]. Primary RN can call report to [phone]. Rapid covid testing ordered 2/5/2025 with negative result. Patient's son, Andrés, updated over phone and is agreeable to this discharge plan. MATT and Keppra script placed in transport/discharge packet. No other CM/discharge needs noted at this time.        02/06/25 1130   Service Assessment   Accompanied By/Relationship No one at bedside   Discharge Planning   Type of Residence Skilled Nursing Facility   Current Services Prior To Admission Skilled Nursing Facility   Patient expects to be discharged to: Skilled nursing facility   Services At/After Discharge   Transition of Care Consult (CM Consult) SNF   Partner SNF Yes   Services At/After Discharge Skilled Nursing Facility (SNF);Transport;In ambulance   Mode of Transport at Discharge [facility]   Hospital Transport Time of Discharge 1430   Confirm Follow Up Transport Family   Condition of Participation: Discharge Planning   The Plan for Transition of Care is related to the following treatment goals: Patient will participate i STR therapies in order to return to safe baseline independence in ADLs.   The Patient and/Or Patient Representative agree with the Discharge Plan? Yes

## 2025-02-10 DIAGNOSIS — I61.1 NONTRAUMATIC CORTICAL HEMORRHAGE OF CEREBRAL HEMISPHERE, UNSPECIFIED LATERALITY (HCC): Primary | ICD-10-CM

## 2025-02-17 ENCOUNTER — HOSPITAL ENCOUNTER (OUTPATIENT)
Dept: CT IMAGING | Age: 89
Discharge: HOME | End: 2025-02-20
Attending: STUDENT IN AN ORGANIZED HEALTH CARE EDUCATION/TRAINING PROGRAM
Payer: MEDICARE

## 2025-02-17 DIAGNOSIS — I61.1 NONTRAUMATIC CORTICAL HEMORRHAGE OF CEREBRAL HEMISPHERE, UNSPECIFIED LATERALITY (HCC): ICD-10-CM

## 2025-02-17 PROCEDURE — 70450 CT HEAD/BRAIN W/O DYE: CPT

## 2025-02-25 ENCOUNTER — OFFICE VISIT (OUTPATIENT)
Dept: NEUROSURGERY | Age: 89
End: 2025-02-25
Payer: MEDICARE

## 2025-02-25 VITALS
OXYGEN SATURATION: 94 % | BODY MASS INDEX: 20.8 KG/M2 | DIASTOLIC BLOOD PRESSURE: 82 MMHG | HEIGHT: 74 IN | TEMPERATURE: 97.4 F | SYSTOLIC BLOOD PRESSURE: 153 MMHG | HEART RATE: 81 BPM

## 2025-02-25 DIAGNOSIS — I61.1 NONTRAUMATIC CORTICAL HEMORRHAGE OF LEFT CEREBRAL HEMISPHERE (HCC): Primary | ICD-10-CM

## 2025-02-25 PROCEDURE — G8420 CALC BMI NORM PARAMETERS: HCPCS | Performed by: NEUROLOGICAL SURGERY

## 2025-02-25 PROCEDURE — 1126F AMNT PAIN NOTED NONE PRSNT: CPT | Performed by: NEUROLOGICAL SURGERY

## 2025-02-25 PROCEDURE — 1036F TOBACCO NON-USER: CPT | Performed by: NEUROLOGICAL SURGERY

## 2025-02-25 PROCEDURE — 1123F ACP DISCUSS/DSCN MKR DOCD: CPT | Performed by: NEUROLOGICAL SURGERY

## 2025-02-25 PROCEDURE — 1111F DSCHRG MED/CURRENT MED MERGE: CPT | Performed by: NEUROLOGICAL SURGERY

## 2025-02-25 PROCEDURE — G8427 DOCREV CUR MEDS BY ELIG CLIN: HCPCS | Performed by: NEUROLOGICAL SURGERY

## 2025-02-25 PROCEDURE — 99215 OFFICE O/P EST HI 40 MIN: CPT | Performed by: NEUROLOGICAL SURGERY

## 2025-02-25 PROCEDURE — 1159F MED LIST DOCD IN RCRD: CPT | Performed by: NEUROLOGICAL SURGERY

## 2025-02-25 NOTE — PROGRESS NOTES
[facility] OFFICE NOTE:         CC: Hospital follow-up for intracerebral    History of Present Illness:  Polo Smith (10/9/1934) is a 89 y.o.  male who was admitted on 1/30/2025 for small intracerebral hemorrhage.  He was on Xarelto which was stopped.  He overall has been neurologically stable and at baseline.  He is having no specific complaint    HPI      History reviewed. No pertinent past medical history.   History reviewed. No pertinent surgical history.   Current Outpatient Medications   Medication Sig Dispense Refill    pantoprazole (PROTONIX) 40 MG tablet Take 1 tablet by mouth every morning (before breakfast) 30 tablet 3    levETIRAcetam (KEPPRA) 500 MG tablet Take 1 tablet by mouth 2 times daily 60 tablet 0    allopurinol (ZYLOPRIM) 100 MG tablet Take 1 tablet by mouth daily      glipiZIDE (GLUCOTROL) 5 MG tablet Take 1 tablet by mouth 2 times daily (before meals)      losartan (COZAAR) 100 MG tablet Take 1 tablet by mouth daily      ezetimibe (ZETIA) 10 MG tablet Take 1 tablet by mouth daily      finasteride (PROSCAR) 5 MG tablet Take 1 tablet by mouth daily       No current facility-administered medications for this visit.      Allergies   Allergen Reactions    Statins Other (See Comments)     Restless legs/night terrors    Night terrors    Night horrors      History reviewed. No pertinent family history.   Social History     Socioeconomic History    Marital status:      Spouse name: Not on file    Number of children: Not on file    Years of education: Not on file    Highest education level: Not on file   Occupational History    Not on file   Tobacco Use    Smoking status: Former     Current packs/day: 1.00     Average packs/day: 1 pack/day for 75.2 years (75.2 ttl pk-yrs)     Types: Cigarettes     Start date: 1/1/1950    Smokeless tobacco: Never   Vaping Use    Vaping status: Never Used   Substance and Sexual Activity    Alcohol use: Never    Drug use: Never

## 2025-03-01 ENCOUNTER — APPOINTMENT (OUTPATIENT)
Dept: CT IMAGING | Age: 89
DRG: 682 | End: 2025-03-01
Payer: MEDICARE

## 2025-03-01 ENCOUNTER — HOSPITAL ENCOUNTER (INPATIENT)
Age: 89
LOS: 5 days | Discharge: NURSING, REHAB OR LONG-TERM CARE | DRG: 682 | End: 2025-03-06
Attending: EMERGENCY MEDICINE | Admitting: FAMILY MEDICINE
Payer: MEDICARE

## 2025-03-01 DIAGNOSIS — D62 ANEMIA DUE TO ACUTE BLOOD LOSS: ICD-10-CM

## 2025-03-01 DIAGNOSIS — D62 ACUTE BLOOD LOSS ANEMIA: ICD-10-CM

## 2025-03-01 DIAGNOSIS — Z87.19 HISTORY OF HEMATEMESIS: Primary | ICD-10-CM

## 2025-03-01 DIAGNOSIS — K92.2 GASTROINTESTINAL HEMORRHAGE, UNSPECIFIED GASTROINTESTINAL HEMORRHAGE TYPE: ICD-10-CM

## 2025-03-01 PROBLEM — Z66 DNR (DO NOT RESUSCITATE): Status: ACTIVE | Noted: 2025-03-01

## 2025-03-01 PROBLEM — L98.429 SKIN ULCER OF SACRUM (HCC): Status: ACTIVE | Noted: 2025-03-01

## 2025-03-01 PROBLEM — R11.12 PROJECTILE VOMITING: Status: ACTIVE | Noted: 2025-03-01

## 2025-03-01 PROBLEM — I48.91 ATRIAL FIBRILLATION (HCC): Status: ACTIVE | Noted: 2025-01-30

## 2025-03-01 PROBLEM — Z86.718 HISTORY OF DVT (DEEP VEIN THROMBOSIS): Status: ACTIVE | Noted: 2025-03-01

## 2025-03-01 PROBLEM — R00.1 BRADYCARDIA: Status: ACTIVE | Noted: 2025-03-01

## 2025-03-01 PROBLEM — Z78.9 NURSING HOME RESIDENT: Status: ACTIVE | Noted: 2025-03-01

## 2025-03-01 PROBLEM — R54 ADVANCED AGE: Status: ACTIVE | Noted: 2025-03-01

## 2025-03-01 LAB
ABO + RH BLD: NORMAL
ANION GAP SERPL CALC-SCNC: 12 MMOL/L (ref 7–16)
BLOOD GROUP ANTIBODIES SERPL: NORMAL
BUN SERPL-MCNC: 26 MG/DL (ref 8–23)
CALCIUM SERPL-MCNC: 8.8 MG/DL (ref 8.8–10.2)
CHLORIDE SERPL-SCNC: 104 MMOL/L (ref 98–107)
CO2 SERPL-SCNC: 24 MMOL/L (ref 20–29)
CREAT SERPL-MCNC: 1.07 MG/DL (ref 0.8–1.3)
EKG ATRIAL RATE: 160 BPM
EKG DIAGNOSIS: NORMAL
EKG Q-T INTERVAL: 372 MS
EKG QRS DURATION: 100 MS
EKG QTC CALCULATION (BAZETT): 430 MS
EKG R AXIS: -29 DEGREES
EKG T AXIS: 26 DEGREES
EKG VENTRICULAR RATE: 80 BPM
ERYTHROCYTE [DISTWIDTH] IN BLOOD BY AUTOMATED COUNT: 14.5 % (ref 11.9–14.6)
GLUCOSE SERPL-MCNC: 100 MG/DL (ref 70–99)
HCT VFR BLD AUTO: 38.5 % (ref 41.1–50.3)
HCT VFR BLD AUTO: 38.9 % (ref 41.1–50.3)
HCT VFR BLD AUTO: 41 % (ref 41.1–50.3)
HGB BLD-MCNC: 12.5 G/DL (ref 13.6–17.2)
HGB BLD-MCNC: 12.7 G/DL (ref 13.6–17.2)
HGB BLD-MCNC: 13.5 G/DL (ref 13.6–17.2)
INR PPP: 1.5
IRON SERPL-MCNC: 19 UG/DL (ref 35–100)
MCH RBC QN AUTO: 27.7 PG (ref 26.1–32.9)
MCHC RBC AUTO-ENTMCNC: 32.1 G/DL (ref 31.4–35)
MCV RBC AUTO: 86.3 FL (ref 82–102)
NRBC # BLD: 0 K/UL (ref 0–0.2)
PLATELET # BLD AUTO: 205 K/UL (ref 150–450)
PMV BLD AUTO: 9.6 FL (ref 9.4–12.3)
POTASSIUM SERPL-SCNC: 3.9 MMOL/L (ref 3.5–5.1)
PROTHROMBIN TIME: 18.7 SEC (ref 11.3–14.9)
RBC # BLD AUTO: 4.51 M/UL (ref 4.23–5.6)
SODIUM SERPL-SCNC: 140 MMOL/L (ref 136–145)
SPECIMEN EXP DATE BLD: NORMAL
TRANSFERRIN SERPL-MCNC: 150 MG/DL (ref 200–360)
WBC # BLD AUTO: 7.6 K/UL (ref 4.3–11.1)

## 2025-03-01 PROCEDURE — 1100000003 HC PRIVATE W/ TELEMETRY

## 2025-03-01 PROCEDURE — 2580000003 HC RX 258: Performed by: EMERGENCY MEDICINE

## 2025-03-01 PROCEDURE — 74174 CTA ABD&PLVS W/CONTRAST: CPT | Performed by: RADIOLOGY

## 2025-03-01 PROCEDURE — 99222 1ST HOSP IP/OBS MODERATE 55: CPT | Performed by: INTERNAL MEDICINE

## 2025-03-01 PROCEDURE — 99223 1ST HOSP IP/OBS HIGH 75: CPT | Performed by: INTERNAL MEDICINE

## 2025-03-01 PROCEDURE — 85027 COMPLETE CBC AUTOMATED: CPT

## 2025-03-01 PROCEDURE — 85018 HEMOGLOBIN: CPT

## 2025-03-01 PROCEDURE — 36415 COLL VENOUS BLD VENIPUNCTURE: CPT

## 2025-03-01 PROCEDURE — 86850 RBC ANTIBODY SCREEN: CPT

## 2025-03-01 PROCEDURE — 85610 PROTHROMBIN TIME: CPT

## 2025-03-01 PROCEDURE — 74174 CTA ABD&PLVS W/CONTRAST: CPT

## 2025-03-01 PROCEDURE — 6360000002 HC RX W HCPCS: Performed by: FAMILY MEDICINE

## 2025-03-01 PROCEDURE — 99285 EMERGENCY DEPT VISIT HI MDM: CPT

## 2025-03-01 PROCEDURE — 93010 ELECTROCARDIOGRAM REPORT: CPT | Performed by: INTERNAL MEDICINE

## 2025-03-01 PROCEDURE — 93005 ELECTROCARDIOGRAM TRACING: CPT | Performed by: EMERGENCY MEDICINE

## 2025-03-01 PROCEDURE — 86900 BLOOD TYPING SEROLOGIC ABO: CPT

## 2025-03-01 PROCEDURE — 6370000000 HC RX 637 (ALT 250 FOR IP): Performed by: FAMILY MEDICINE

## 2025-03-01 PROCEDURE — 83540 ASSAY OF IRON: CPT

## 2025-03-01 PROCEDURE — 2580000003 HC RX 258: Performed by: FAMILY MEDICINE

## 2025-03-01 PROCEDURE — 85014 HEMATOCRIT: CPT

## 2025-03-01 PROCEDURE — 80048 BASIC METABOLIC PNL TOTAL CA: CPT

## 2025-03-01 PROCEDURE — 6360000002 HC RX W HCPCS: Performed by: EMERGENCY MEDICINE

## 2025-03-01 PROCEDURE — 84466 ASSAY OF TRANSFERRIN: CPT

## 2025-03-01 PROCEDURE — 6360000004 HC RX CONTRAST MEDICATION: Performed by: FAMILY MEDICINE

## 2025-03-01 PROCEDURE — 86901 BLOOD TYPING SEROLOGIC RH(D): CPT

## 2025-03-01 PROCEDURE — 2500000003 HC RX 250 WO HCPCS: Performed by: FAMILY MEDICINE

## 2025-03-01 RX ORDER — SODIUM CHLORIDE 9 MG/ML
INJECTION, SOLUTION INTRAVENOUS PRN
Status: DISCONTINUED | OUTPATIENT
Start: 2025-03-01 | End: 2025-03-06 | Stop reason: HOSPADM

## 2025-03-01 RX ORDER — FINASTERIDE 5 MG/1
5 TABLET, FILM COATED ORAL DAILY
Status: DISCONTINUED | OUTPATIENT
Start: 2025-03-01 | End: 2025-03-06 | Stop reason: HOSPADM

## 2025-03-01 RX ORDER — ONDANSETRON 2 MG/ML
4 INJECTION INTRAMUSCULAR; INTRAVENOUS EVERY 6 HOURS PRN
Status: DISCONTINUED | OUTPATIENT
Start: 2025-03-01 | End: 2025-03-06 | Stop reason: HOSPADM

## 2025-03-01 RX ORDER — IOPAMIDOL 755 MG/ML
100 INJECTION, SOLUTION INTRAVASCULAR
Status: COMPLETED | OUTPATIENT
Start: 2025-03-01 | End: 2025-03-01

## 2025-03-01 RX ORDER — SODIUM CHLORIDE 0.9 % (FLUSH) 0.9 %
5-40 SYRINGE (ML) INJECTION PRN
Status: DISCONTINUED | OUTPATIENT
Start: 2025-03-01 | End: 2025-03-06 | Stop reason: HOSPADM

## 2025-03-01 RX ORDER — LEVETIRACETAM 500 MG/1
500 TABLET ORAL 2 TIMES DAILY
Status: DISCONTINUED | OUTPATIENT
Start: 2025-03-01 | End: 2025-03-06 | Stop reason: HOSPADM

## 2025-03-01 RX ORDER — SODIUM CHLORIDE 0.9 % (FLUSH) 0.9 %
5-40 SYRINGE (ML) INJECTION EVERY 12 HOURS SCHEDULED
Status: DISCONTINUED | OUTPATIENT
Start: 2025-03-01 | End: 2025-03-06 | Stop reason: HOSPADM

## 2025-03-01 RX ORDER — SODIUM CHLORIDE, SODIUM LACTATE, POTASSIUM CHLORIDE, CALCIUM CHLORIDE 600; 310; 30; 20 MG/100ML; MG/100ML; MG/100ML; MG/100ML
INJECTION, SOLUTION INTRAVENOUS CONTINUOUS
Status: DISCONTINUED | OUTPATIENT
Start: 2025-03-01 | End: 2025-03-03

## 2025-03-01 RX ORDER — LOSARTAN POTASSIUM 50 MG/1
100 TABLET ORAL DAILY
Status: DISCONTINUED | OUTPATIENT
Start: 2025-03-01 | End: 2025-03-06 | Stop reason: HOSPADM

## 2025-03-01 RX ORDER — ALLOPURINOL 100 MG/1
100 TABLET ORAL DAILY
Status: DISCONTINUED | OUTPATIENT
Start: 2025-03-01 | End: 2025-03-06 | Stop reason: HOSPADM

## 2025-03-01 RX ORDER — EZETIMIBE 10 MG/1
10 TABLET ORAL DAILY
Status: DISCONTINUED | OUTPATIENT
Start: 2025-03-01 | End: 2025-03-06 | Stop reason: HOSPADM

## 2025-03-01 RX ORDER — ACETAMINOPHEN 650 MG/1
650 SUPPOSITORY RECTAL EVERY 6 HOURS PRN
Status: DISCONTINUED | OUTPATIENT
Start: 2025-03-01 | End: 2025-03-06 | Stop reason: HOSPADM

## 2025-03-01 RX ADMIN — SODIUM CHLORIDE, PRESERVATIVE FREE 10 ML: 5 INJECTION INTRAVENOUS at 22:07

## 2025-03-01 RX ADMIN — ALLOPURINOL 100 MG: 100 TABLET ORAL at 13:31

## 2025-03-01 RX ADMIN — IOPAMIDOL 100 ML: 755 INJECTION, SOLUTION INTRAVENOUS at 06:58

## 2025-03-01 RX ADMIN — LOSARTAN POTASSIUM 100 MG: 50 TABLET, FILM COATED ORAL at 13:31

## 2025-03-01 RX ADMIN — EZETIMIBE 10 MG: 10 TABLET ORAL at 13:31

## 2025-03-01 RX ADMIN — SODIUM CHLORIDE 80 MG: 9 INJECTION INTRAMUSCULAR; INTRAVENOUS; SUBCUTANEOUS at 04:12

## 2025-03-01 RX ADMIN — FINASTERIDE 5 MG: 5 TABLET, FILM COATED ORAL at 13:32

## 2025-03-01 RX ADMIN — LEVETIRACETAM 500 MG: 500 TABLET, FILM COATED ORAL at 13:32

## 2025-03-01 RX ADMIN — SODIUM CHLORIDE 40 MG: 9 INJECTION INTRAMUSCULAR; INTRAVENOUS; SUBCUTANEOUS at 17:03

## 2025-03-01 RX ADMIN — SODIUM CHLORIDE, SODIUM LACTATE, POTASSIUM CHLORIDE, AND CALCIUM CHLORIDE: 600; 310; 30; 20 INJECTION, SOLUTION INTRAVENOUS at 07:52

## 2025-03-01 RX ADMIN — LEVETIRACETAM 500 MG: 500 TABLET, FILM COATED ORAL at 22:07

## 2025-03-01 RX ADMIN — SODIUM CHLORIDE, SODIUM LACTATE, POTASSIUM CHLORIDE, AND CALCIUM CHLORIDE: 600; 310; 30; 20 INJECTION, SOLUTION INTRAVENOUS at 21:35

## 2025-03-01 ASSESSMENT — LIFESTYLE VARIABLES
HOW MANY STANDARD DRINKS CONTAINING ALCOHOL DO YOU HAVE ON A TYPICAL DAY: PATIENT DOES NOT DRINK
HOW OFTEN DO YOU HAVE A DRINK CONTAINING ALCOHOL: NEVER

## 2025-03-01 ASSESSMENT — PAIN SCALES - GENERAL
PAINLEVEL_OUTOF10: 0
PAINLEVEL_OUTOF10: 0

## 2025-03-01 ASSESSMENT — PAIN - FUNCTIONAL ASSESSMENT: PAIN_FUNCTIONAL_ASSESSMENT: NONE - DENIES PAIN

## 2025-03-01 NOTE — CONSULTS
[facility] Card[facility] Initial Cardiac Evaluation                 Date of  Admission: 3/1/2025 12:41 AM     Primary Care Physician:Renzo Gomez MD  Primary Cardiologist: Dr. Dorantes in 2028  Referring Physician: Hospitalist   Supervising Cardiologist:Dr. Anderson    Reason for cardiac evaluation:bradycardia       Polo Smith is a 89 y.o. male with prior h/o intracranial hemorrhage, hypertension, hyperlipidemia, type 2 diabetes mellitus, benign prostatic hypertrophy, atrial fibrillation, DVT, hypokalemia, UTI, stage III sacral ulcer who presented from his nursing home at [facility] with complaints that he started vomiting brown material. Labs in ED showed Hgb went from 16 end of last month to 12.5. Patient was admitted with GIB. Last month eliquis was stopped d/t recent intracranial bleed. GI saw patient this am and recommended conservative treatment with PPI bid. This am bradycardia noted with underlying A. Fib rate 30 so cardiology was consulted.        Patient Active Problem List   Diagnosis    Chronic diastolic heart failure (HCC)    Essential hypertension    Diabetes mellitus type 2, controlled (HCC)    Hyperlipidemia    BPH (benign prostatic hyperplasia)    Elevated prostate specific antigen (PSA)    BPH with obstruction/lower urinary tract symptoms    Hypertensive heart disease with CHF (HCC)    Hypoxemia    Pulmonary hypertension (HCC)    Spinal abscess (HCC)    Diabetes mellitus type II, uncontrolled    Ex-smoker for more than 1 year    Borderline type 2 diabetes mellitus    Prostate cancer (HCC)    Deep vein thrombosis (DVT) (HCC)    Stroke (HCC)    Bilateral carotid artery disease    Anticoagulated on Coumadin    Chest discomfort    Anemia    Wound infection    DVT (deep venous thrombosis) (HCC)    Lumbar stenosis with neurogenic claudication    Aphasia    Palliative care encounter    Moderate protein-calorie malnutrition    Vitamin B12 deficiency    Acute metabolic encephalopathy    Stroke-like symptoms     69.9 kg (154 lb 1.6 oz)    Height:   1.829 m (6')        Physical Exam:  General: Well Developed,  No Acute Distress, dementia   Neck: supple, no JVD, no carotid bruits  Heart: S1S2 irregular   Lungs: Coarse in bases   Abd: soft, nondistended, with good bowel sounds  Ext: warm, no edema, calves supple/nontender, pulses 2+ bilaterally  Skin: warm and dry  Psychiatric: dementia  Neurologic: will not open eyes to name, has dementia     Cardiographics    Telemetry: AFIB rate 70s   ECG: A. Fib rate controlled   Echocardiogram: 01/21/25    ECHO (TTE) COMPLETE (PRN CONTRAST/BUBBLE/STRAIN/3D) 01/21/2025  5:41 PM (Final)    Interpretation Summary    Left Ventricle: Normal left ventricular systolic function with a visually estimated EF of 50 - 55%. Left ventricle size is normal. Mildly increased wall thickness. Normal wall motion.    Aortic Valve: Mild regurgitation.    Left Atrium: Left atrium is moderately dilated.    Image quality is adequate.    Signed by: Ronny Morrow MD on 1/21/2025  5:41 PM     Labs:   Recent Labs     03/01/25  0129      K 3.9   BUN 26*   WBC 7.6   HGB 12.5*   HCT 38.9*      INR 1.5        Assessment/Plan:     Assessment:      Principal Problem:    GI bleed- per primary team and GI    Active Problems:    Chronic diastolic heart failure (HCC)- looks dry; getting IVFs      Essential hypertension- per primary team        DVT (deep venous thrombosis) (HCC)- not a candidate for AC due to recent ICH.       Atrial fibrillation, chronic (HCC)- not a candidate for AC due to recent ICH.       Projectile vomiting- per primary team      Acute blood loss anemia    Bradycardia- not symptomatic. Was on high dose coreg prior to admission. Holding BB. Likely had BB (bid yesterday). With h/o dementia and sacral ulcer not a good candidate for PM. Will follow.       We appreciate the opportunity to participate in this patient's care.     ERNESTINE ADAM - CNP  Supervising MD: Monica

## 2025-03-01 NOTE — ED PROVIDER NOTES
Unable To Answer (1/31/2025)    Housing Stability Vital Sign     Unable to Pay for Housing in the Last Year: Patient unable to answer     Number of Times Moved in the Last Year: 1     Homeless in the Last Year: Patient unable to answer        Previous Medications    ALLOPURINOL (ZYLOPRIM) 100 MG TABLET    Take 1 tablet by mouth daily    CARVEDILOL (COREG) 25 MG TABLET    Take 1 tablet by mouth 2 times daily (with meals)    EZETIMIBE (ZETIA) 10 MG TABLET    Take 1 tablet by mouth daily    FINASTERIDE (PROSCAR) 5 MG TABLET    Take 1 tablet by mouth daily    GLIPIZIDE (GLUCOTROL) 5 MG TABLET    Take 1 tablet by mouth 2 times daily (before meals)    LEVETIRACETAM (KEPPRA) 500 MG TABLET    Take 1 tablet by mouth 2 times daily    LOSARTAN (COZAAR) 100 MG TABLET    Take 1 tablet by mouth daily        Results from this emergency department visit:      Results for orders placed or performed during the hospital encounter of 03/01/25   CBC   Result Value Ref Range    WBC 7.6 4.3 - 11.1 K/uL    RBC 4.51 4.23 - 5.6 M/uL    Hemoglobin 12.5 (L) 13.6 - 17.2 g/dL    Hematocrit 38.9 (L) 41.1 - 50.3 %    MCV 86.3 82 - 102 FL    MCH 27.7 26.1 - 32.9 PG    MCHC 32.1 31.4 - 35.0 g/dL    RDW 14.5 11.9 - 14.6 %    Platelets 205 150 - 450 K/uL    MPV 9.6 9.4 - 12.3 FL    nRBC 0.00 0.0 - 0.2 K/uL   Basic Metabolic Panel   Result Value Ref Range    Sodium 140 136 - 145 mmol/L    Potassium 3.9 3.5 - 5.1 mmol/L    Chloride 104 98 - 107 mmol/L    CO2 24 20 - 29 mmol/L    Anion Gap 12 7 - 16 mmol/L    Glucose 100 (H) 70 - 99 mg/dL    BUN 26 (H) 8 - 23 MG/DL    Creatinine 1.07 0.80 - 1.30 MG/DL    Est, Glom Filt Rate 66 >60 ml/min/1.73m2    Calcium 8.8 8.8 - 10.2 MG/DL   Protime-INR   Result Value Ref Range    Protime 18.7 (H) 11.3 - 14.9 sec    INR 1.5     TYPE AND SCREEN   Result Value Ref Range    Crossmatch expiration date 03/04/2025,2359     ABO/Rh O POSITIVE     Antibody Screen NEG          No orders to display                No results for  input(s): \"COVID19\" in the last 72 hours.     Voice dictation software was used during the making of this note.  This software is not perfect and grammatical and other typographical errors may be present.  This note has not been completely proofread for errors.     Natanael Real MD  03/01/25 0238

## 2025-03-01 NOTE — ED NOTES
TRANSFER - OUT REPORT:    Verbal report given to CHRISSY Pathak on Polo Smith  being transferred to [facility] for routine progression of patient care       Report consisted of patient's Situation, Background, Assessment and   Recommendations(SBAR).     Information from the following report(s) Nurse Handoff Report was reviewed with the receiving nurse.    Mauri Fall Assessment:    Presents to emergency department  because of falls (Syncope, seizure, or loss of consciousness): No  Age > 70: Yes  Altered Mental Status, Intoxication with alcohol or substance confusion (Disorientation, impaired judgment, poor safety awaremess, or inability to follow instructions): No  Impaired Mobility: Ambulates or transfers with assistive devices or assistance; Unable to ambulate or transer.: Yes  Nursing Judgement: Yes          Lines:   Peripheral IV 03/01/25 Right Antecubital (Active)   Site Assessment Clean, dry & intact 03/01/25 0134   Line Status Blood return noted 03/01/25 0134   Line Care Connections checked and tightened 03/01/25 0134   Phlebitis Assessment No symptoms 03/01/25 0134   Infiltration Assessment 0 03/01/25 0134   Dressing Status Clean, dry & intact 03/01/25 0134   Dressing Type Transparent 03/01/25 0134        Opportunity for questions and clarification was provided.      Patient transported with:  Stacy Quick RN  03/01/25 7691

## 2025-03-01 NOTE — ED TRIAGE NOTES
Pt arrives from [facility] w c/o dark brown projectile vomiting for 15 min.   Pt denies abd pain.Denies chest pain. Denies SOB. AOX2 -baseline  HX: AFIB  [id number]    20g RAC received 4 mg zofran

## 2025-03-01 NOTE — PROGRESS NOTES
.TRANSFER - IN REPORT:    Verbal report received from CHRISSY Kumar on Polo Smith  being received from ED for routine progression of patient care      Report consisted of patient's Situation, Background, Assessment and   Recommendations(SBAR).     Information from the following report(s) Nurse Handoff Report, Index, ED Encounter Summary, ED SBAR, Adult Overview, Intake/Output, MAR, and Recent Results was reviewed with the receiving nurse.    Opportunity for questions and clarification was provided.      Assessment completed upon patient's arrival to unit and care assumed.      Dual skin assessment completed. Generalized skin pale. Sacrum with DTI/pressure injury with open area on inner L cheek surrounded by blanchable erythema. Sacral opening covered with small allevyn. Entire sacrum covered with large clear allevyn. R heel with blanchable erythema and flaky skin. L heel with flaky skin and without redness. Foam allevyns placed on both heels for prevention of any further breakdown. BLE slightly bertha, pale, and pink with varicosities. Multiple scattered abrasions/scabs/healing skin tears all over pt's body. Additional scattered scars and bruising present. No additional abnormalities noted.    4 Eyes Skin Assessment     NAME:  Polo Smith  YOB: 1934  MEDICAL RECORD NUMBER:  [id number]    The patient is being assessed for  Admission    I agree that at least one RN has performed a thorough Head to Toe Skin Assessment on the patient. ALL assessment sites listed below have been assessed.      Areas assessed by both nurses:    Head, Face, Ears, Shoulders, Back, Chest, Arms, Elbows, Hands, Sacrum. Buttock, Coccyx, Ischium, Legs. Feet and Heels, and Under Medical Devices         Does the Patient have a Wound? Yes wound(s) were present on assessment. LDA wound assessment was Initiated and completed by CHRISSY Sawant Prevention initiated by RN: Yes  Wound Care Orders initiated by RN: Yes    Pressure Injury  (Stage 3,4, Unstageable, DTI, NWPT, and Complex wounds) if present, place Wound referral order by RN under : Yes    New Ostomies, if present place, Ostomy referral order under : No     Nurse 1 eSignature: Electronically signed by Hari Lal RN on 3/1/25 at 8:09 AM EST    **SHARE this note so that the co-signing nurse can place an eSignature**    Nurse 2 eSignature: {Esignature:[id number]}

## 2025-03-01 NOTE — H&P
03/01/25 0232 -- 79 21 (!) 155/96 94 %   03/01/25 0212 -- 82 19 (!) 149/73 95 %   03/01/25 0202 -- 83 20 (!) 144/72 94 %   03/01/25 0145 -- 88 20 (!) 141/77 95 %   03/01/25 0130 -- 87 22 136/84 96 %   03/01/25 0047 98.5 °F (36.9 °C) 94 16 (!) 147/77 95 %       Oxygen Therapy  SpO2: 98 %  Pulse via Oximetry: 89 beats per minute  O2 Device: None (Room air)    Estimated body mass index is 20.9 kg/m² as calculated from the following:    Height as of this encounter: 1.829 m (6').    Weight as of this encounter: 69.9 kg (154 lb 1.6 oz).  No intake or output data in the 24 hours ending 03/01/25 0814      Physical Exam:    General:    Frail and ill-appearing male lying on stretcher in no acute distress.    Head:  Normocephalic, atraumatic  Eyes:  Sclerae appear normal.  Pupils equally round.  ENT:  Nares appear normal.  Moist oral mucosa  Neck:  No restricted ROM.  Trachea midline   CV:   RRR.  No m/r/g.  No jugular venous distension.  Lungs:   CTAB.  No wheezing, rhonchi, or rales.  Symmetric expansion.  Abdomen:   Positive hypoactive bowel sounds, soft, mild tenderness to deep palpation, +distended.  No rebound  Extremities: No cyanosis or clubbing.  No edema  Skin:     No rashes.  Normal coloration.   Warm and dry.    Neuro:  CN II-XII grossly intact.  Sensation intact.   Psych:  Normal mood and affect.      Orders Placed This Encounter   Medications    pantoprazole (PROTONIX) 80 mg in sodium chloride (PF) 0.9 % 20 mL injection    pantoprazole (PROTONIX) 40 mg in sodium chloride (PF) 0.9 % 10 mL injection    sodium chloride flush 0.9 % injection 5-40 mL    sodium chloride flush 0.9 % injection 5-40 mL    0.9 % sodium chloride infusion    ondansetron (ZOFRAN) injection 4 mg    acetaminophen (TYLENOL) suppository 650 mg    lactated ringers infusion    iopamidol (ISOVUE-370) 76 % injection 100 mL       Prior to Admit Medications:  Current Outpatient Medications   Medication Instructions    allopurinol (ZYLOPRIM) 100 mg,  (80459) on 3/1/2025 6:35:37 AM         No results for input(s): \"COVID19\" in the last 72 hours.    No results found.      Signed:  Whit Hurt MD    Part of this note may have been written by using a voice dictation software.  The note has been proof read but may still contain some grammatical/other typographical errors.

## 2025-03-01 NOTE — ED NOTES
Right before taking patient to floor, patient HR noted to drop into 30's. Notified Dr. Real. Took orders for EKG, performed. HR recovered quickly to normal range. Dr. Hurt notified. Anticipate orders for change of bed order.     Stacy Richards, CHRISSY  03/01/25 0457

## 2025-03-01 NOTE — ED NOTES
TRANSFER - OUT REPORT:    Verbal report given to William LOWE on Polo Smith  being transferred to [facility] for routine progression of patient care       Report consisted of patient's Situation, Background, Assessment and   Recommendations(SBAR).     Information from the following report(s) Nurse Handoff Report was reviewed with the receiving nurse.    Mauri Fall Assessment:    Presents to emergency department  because of falls (Syncope, seizure, or loss of consciousness): No  Age > 70: Yes  Altered Mental Status, Intoxication with alcohol or substance confusion (Disorientation, impaired judgment, poor safety awaremess, or inability to follow instructions): No  Impaired Mobility: Ambulates or transfers with assistive devices or assistance; Unable to ambulate or transer.: Yes  Nursing Judgement: Yes          Lines:   Peripheral IV 03/01/25 Right Antecubital (Active)   Site Assessment Clean, dry & intact 03/01/25 0134   Line Status Blood return noted 03/01/25 0134   Line Care Connections checked and tightened 03/01/25 0134   Phlebitis Assessment No symptoms 03/01/25 0134   Infiltration Assessment 0 03/01/25 0134   Dressing Status Clean, dry & intact 03/01/25 0134   Dressing Type Transparent 03/01/25 0134        Opportunity for questions and clarification was provided.      Patient transported with:  Monitor and Registered Nurse           Maurice, Stacy, RN  03/01/25 9168

## 2025-03-01 NOTE — CONSULTS
GASTROENTEROLOGY CONSULT NOTE     CC: Coffee ground emesis     HPI:   Polo Smith is 89 y.o. y/o male with dementia presented to the ED bt EMS with complaints of dark brown vomit. Patient is poor historian, his hilario obtained from team and chart review. Patient had witnessed episode of dark vomit. Since admission he has not had a bowel movement or further episodes of emesis. Hgb decreased from previous a month ago but stable       PE:   Vitals:    03/01/25 0602   BP: (!) 168/93   Pulse: 89   Resp: 20   Temp: 97.9 °F (36.6 °C)   SpO2: 98%      General:  The patient is in no acute distress.    Respiratory: Respiratory effort is normal.    Cardiovascular:  Regular rate and rhythm.     Abdomen:  Soft, non tender to palpation.    Extremities: No edema bilaterally.   Neurologic:  alert. Not oriented.       Labs:  Lab Results   Component Value Date    HGB 12.5 (L) 03/01/2025    WBC 7.6 03/01/2025     03/01/2025    MCV 86.3 03/01/2025    CREATININE 1.07 03/01/2025    ALT 7 (L) 01/21/2025    AST 17 01/21/2025       Assessment/Plan:   Coffee ground emesis. No further episodes since admission. Hgb stable.     - Recommend conservative management with PPI twice daily   - Consider EGD if he develops signs of overt GI bleeding and significant decrease in hgb   - Ok to advance diet as tolerated from GI standpoint        GI team will sign off, call with questions or concerns.     MD [facility]          MD [facility]

## 2025-03-01 NOTE — PROGRESS NOTES
TRANSFER - IN REPORT:    Verbal report received from CHRISSY Kumar on Polo Smith  being received from ED for routine progression of patient care      Report consisted of patient's Situation, Background, Assessment and   Recommendations(SBAR).     Information from the following report(s) Nurse Handoff Report was reviewed with the receiving nurse.    Opportunity for questions and clarification was provided.      Assessment completed upon patient's arrival to unit and care assumed.

## 2025-03-01 NOTE — PROGRESS NOTES
Hospitalist Progress Note   Admit Date:  3/1/2025 12:41 AM   Name:  Polo Smith   Age:  89 y.o.  Sex:  male  :  10/9/1934   MRN:  [id number]   Room:  [facility]/    Presenting/Chief Complaint: Vomiting     Reason(s) for Admission: Anemia due to acute blood loss [D62]  GI bleed [K92.2]  History of hematemesis [Z87.19]     Hospital Course:   Polo Smith is a 89 y.o. male with medical history of  intracranial hemorrhage, hypertension, hyperlipidemia, type 2 diabetes mellitus, benign prostatic hypertrophy, atrial fibrillation, hypokalemia, UTI, stage III sacral ulcer who presented from his nursing home at [facility] with complaints of coffee-ground hematemesis.    Patient admitted for possible GI bleed.  No further episodes of emesis since admission.  GI consulted and recommend conservative management at this time.  Patient noted with sinus bradycardia, heart rate dropped to 30s with underlying A-fib rhythm.  Cardiology consulted.  Carvedilol held.    Subjective & 24hr Events:   Patient is seen and examined at the bedside.  Was not feeling well.  Complaining of fatigue and tiredness.  No more episodes of hematemesis since admission.  Endorses some nausea.  Denies chest pain, palpitation or shortness of breath.    Heart rate dropped to low 30s.  Carvedilol on hold.  Cardiology consulted    Assessment & Plan:     GI bleed:  GI consulted and recommend conservative management  Monitor H&H  Continue PPI  Transfuse for hemoglobin less than 7    Bradycardia:  Paroxysmal A-fib:  Carvedilol on hold  Not on any anticoagulation due to intracranial hemorrhage    Chronic diastolic heart failure:  Hypertension/hyperlipidemia:  Not in acute exacerbation  Clinically looks dry, getting IV fluids  Continue losartan and Zetia    History of DVT:  Off of Eliquis due to intracranial hemorrhage    Recent history of intracranial hemorrhage:  Continue Keppra    Sacral wound:  Wound care    History of dementia:  Noted      Anticipated  equally round.  ENT:  Nares appear normal.  Moist oral mucosa  Neck:  No restricted ROM.  Trachea midline   CV:   RRR.  No m/r/g.  No jugular venous distension.  Lungs:   CTAB.  No wheezing, rhonchi, or rales.  Symmetric expansion.  Abdomen:   Soft, nontender, nondistended.  Extremities: No cyanosis or clubbing.  No edema  Skin:     No rashes.  Normal coloration.   Warm and dry.    Neuro:  CN II-XII grossly intact.    Psych:  Normal mood and affect.      I have personally reviewed labs and tests:  Recent Labs:  Recent Results (from the past 48 hour(s))   CBC    Collection Time: 03/01/25  1:29 AM   Result Value Ref Range    WBC 7.6 4.3 - 11.1 K/uL    RBC 4.51 4.23 - 5.6 M/uL    Hemoglobin 12.5 (L) 13.6 - 17.2 g/dL    Hematocrit 38.9 (L) 41.1 - 50.3 %    MCV 86.3 82 - 102 FL    MCH 27.7 26.1 - 32.9 PG    MCHC 32.1 31.4 - 35.0 g/dL    RDW 14.5 11.9 - 14.6 %    Platelets 205 150 - 450 K/uL    MPV 9.6 9.4 - 12.3 FL    nRBC 0.00 0.0 - 0.2 K/uL   Basic Metabolic Panel    Collection Time: 03/01/25  1:29 AM   Result Value Ref Range    Sodium 140 136 - 145 mmol/L    Potassium 3.9 3.5 - 5.1 mmol/L    Chloride 104 98 - 107 mmol/L    CO2 24 20 - 29 mmol/L    Anion Gap 12 7 - 16 mmol/L    Glucose 100 (H) 70 - 99 mg/dL    BUN 26 (H) 8 - 23 MG/DL    Creatinine 1.07 0.80 - 1.30 MG/DL    Est, Glom Filt Rate 66 >60 ml/min/1.73m2    Calcium 8.8 8.8 - 10.2 MG/DL   Protime-INR    Collection Time: 03/01/25  1:29 AM   Result Value Ref Range    Protime 18.7 (H) 11.3 - 14.9 sec    INR 1.5     TYPE AND SCREEN    Collection Time: 03/01/25  1:29 AM   Result Value Ref Range    Crossmatch expiration date 03/04/2025,2359     ABO/Rh O POSITIVE     Antibody Screen NEG    EKG 12 Lead    Collection Time: 03/01/25  4:40 AM   Result Value Ref Range    Ventricular Rate 80 BPM    Atrial Rate 160 BPM    QRS Duration 100 ms    Q-T Interval 372 ms    QTc Calculation (Bazett) 430 ms    R Axis -29 degrees    T Axis 26 degrees    Diagnosis       Atrial

## 2025-03-02 PROBLEM — N17.9 AKI (ACUTE KIDNEY INJURY): Status: ACTIVE | Noted: 2025-03-02

## 2025-03-02 PROBLEM — N39.0 UTI (URINARY TRACT INFECTION): Status: RESOLVED | Noted: 2025-01-31 | Resolved: 2025-03-02

## 2025-03-02 LAB
ANION GAP SERPL CALC-SCNC: 13 MMOL/L (ref 7–16)
BUN SERPL-MCNC: 52 MG/DL (ref 8–23)
CALCIUM SERPL-MCNC: 7.8 MG/DL (ref 8.8–10.2)
CHLORIDE SERPL-SCNC: 105 MMOL/L (ref 98–107)
CO2 SERPL-SCNC: 21 MMOL/L (ref 20–29)
CREAT SERPL-MCNC: 1.44 MG/DL (ref 0.8–1.3)
GLUCOSE SERPL-MCNC: 87 MG/DL (ref 70–99)
HCT VFR BLD AUTO: 37 % (ref 41.1–50.3)
HCT VFR BLD AUTO: 37.2 % (ref 41.1–50.3)
HGB BLD-MCNC: 11.8 G/DL (ref 13.6–17.2)
HGB BLD-MCNC: 12.2 G/DL (ref 13.6–17.2)
IRON SATN MFR SERPL: 8 % (ref 20–50)
IRON SERPL-MCNC: 14 UG/DL (ref 35–100)
POTASSIUM SERPL-SCNC: 4 MMOL/L (ref 3.5–5.1)
SODIUM SERPL-SCNC: 139 MMOL/L (ref 136–145)
TIBC SERPL-MCNC: 170 UG/DL (ref 240–450)
UIBC SERPL-MCNC: 156 UG/DL (ref 112–347)

## 2025-03-02 PROCEDURE — 2580000003 HC RX 258: Performed by: FAMILY MEDICINE

## 2025-03-02 PROCEDURE — 97535 SELF CARE MNGMENT TRAINING: CPT

## 2025-03-02 PROCEDURE — 83540 ASSAY OF IRON: CPT

## 2025-03-02 PROCEDURE — 97166 OT EVAL MOD COMPLEX 45 MIN: CPT

## 2025-03-02 PROCEDURE — 36415 COLL VENOUS BLD VENIPUNCTURE: CPT

## 2025-03-02 PROCEDURE — 85014 HEMATOCRIT: CPT

## 2025-03-02 PROCEDURE — 97161 PT EVAL LOW COMPLEX 20 MIN: CPT

## 2025-03-02 PROCEDURE — 97530 THERAPEUTIC ACTIVITIES: CPT

## 2025-03-02 PROCEDURE — 6370000000 HC RX 637 (ALT 250 FOR IP): Performed by: FAMILY MEDICINE

## 2025-03-02 PROCEDURE — 2500000003 HC RX 250 WO HCPCS: Performed by: FAMILY MEDICINE

## 2025-03-02 PROCEDURE — 6360000002 HC RX W HCPCS: Performed by: FAMILY MEDICINE

## 2025-03-02 PROCEDURE — 80048 BASIC METABOLIC PNL TOTAL CA: CPT

## 2025-03-02 PROCEDURE — 85018 HEMOGLOBIN: CPT

## 2025-03-02 PROCEDURE — 1100000003 HC PRIVATE W/ TELEMETRY

## 2025-03-02 PROCEDURE — 83550 IRON BINDING TEST: CPT

## 2025-03-02 RX ADMIN — LEVETIRACETAM 500 MG: 500 TABLET, FILM COATED ORAL at 09:38

## 2025-03-02 RX ADMIN — ALLOPURINOL 100 MG: 100 TABLET ORAL at 09:38

## 2025-03-02 RX ADMIN — SODIUM CHLORIDE 40 MG: 9 INJECTION INTRAMUSCULAR; INTRAVENOUS; SUBCUTANEOUS at 06:04

## 2025-03-02 RX ADMIN — SODIUM CHLORIDE, PRESERVATIVE FREE 10 ML: 5 INJECTION INTRAVENOUS at 21:35

## 2025-03-02 RX ADMIN — SODIUM CHLORIDE, SODIUM LACTATE, POTASSIUM CHLORIDE, AND CALCIUM CHLORIDE: 600; 310; 30; 20 INJECTION, SOLUTION INTRAVENOUS at 14:20

## 2025-03-02 RX ADMIN — ONDANSETRON 4 MG: 2 INJECTION, SOLUTION INTRAMUSCULAR; INTRAVENOUS at 00:22

## 2025-03-02 RX ADMIN — LEVETIRACETAM 500 MG: 500 TABLET, FILM COATED ORAL at 21:35

## 2025-03-02 RX ADMIN — SODIUM CHLORIDE 40 MG: 9 INJECTION INTRAMUSCULAR; INTRAVENOUS; SUBCUTANEOUS at 17:06

## 2025-03-02 RX ADMIN — FINASTERIDE 5 MG: 5 TABLET, FILM COATED ORAL at 09:38

## 2025-03-02 RX ADMIN — EZETIMIBE 10 MG: 10 TABLET ORAL at 09:38

## 2025-03-02 ASSESSMENT — PAIN SCALES - GENERAL
PAINLEVEL_OUTOF10: 0

## 2025-03-02 NOTE — PROGRESS NOTES
ACUTE OCCUPATIONAL THERAPY GOALS:   (Developed with and agreed upon by patient and/or caregiver.)  1. Pt will toilet with mod A   2. Pt will complete functional mobility for ADLs with mod A using AD as needed  3. Pt will complete upper body bathing and dressing with min A using AE as needed  4. Pt will complete grooming and feeding with min A  5.  Pt will tolerate 23 minutes functional activity with min or fewer rest breaks to promote increased endurance for ADLs      Timeframe: 7 visits      OCCUPATIONAL THERAPY Initial Assessment and Daily Note       OT Visit Days: 1  Acknowledge Orders  Time  OT Charge Capture  Rehab Caseload Tracker      Polo Smith is a 89 y.o. male   PRIMARY DIAGNOSIS: GI bleed  Anemia due to acute blood loss [D62]  GI bleed [K92.2]  History of hematemesis [Z87.19]       Reason for Referral: Generalized Muscle Weakness (M62.81)  Inpatient: Payor: MEDICARE / Plan: MEDICARE PART A AND B / Product Type: *No Product type* /     ASSESSMENT:     REHAB RECOMMENDATIONS:   Recommendation to date pending progress:  Setting:  Short-term Rehab    Equipment:    To Be Determined     ASSESSMENT:  Mr. Smith was admitted from rehab with vomiting and anemia. Pt presented with deficits in cognition, mobility, balance, activity tolerance, UE ROM, and overall strength impacting ADL performance. Pt required max-total A for ADLs and max x2 for bed mobility, unable to stand. Max-total A for all ADLs.  Pt minimally interactive w/ poor memory and attention and required max cues to initiate and complete all tasks. Pt fatigued quickly and was able to tolerate only minimal edge of bed activity before returning to supine. Pt presented below his functional baseline and would benefit from skilled OT services to address deficits. Rec STR.     [facility] AM-PAC™ “6 Clicks” Daily Activity Inpatient Short Form:    AM-PAC Daily Activity - Inpatient   How much help is needed for putting on and taking off regular    (See Comments)   Orientation []  A&O x2   Vision []     Hearing []     Cognition  []  Impaired; memory, attention, processing, problem solving   Perception []       MOBILITY: I Mod I S SBA CGA Min Mod Max Total  NT x2 Comments:   Bed Mobility    Rolling [] [] [] [] [] [] [] [] [] [] []    Supine to Sit [] [] [] [] [] [] [] [] [] [] []    Scooting [] [] [] [] [] [] [] [] [x] [] []    Sit to Supine [] [] [] [] [] [] [] [x] [] [] [x]    Transfers    Sit to Stand [] [] [] [] [] [] [] [] [] [] []    Bed to Chair [] [] [] [] [] [] [] [] [] [] []    Stand to Sit [] [] [] [] [] [] [] [] [] [] []    Tub/Shower [] [] [] [] [] [] [] [] [] [] []     Toilet [] [] [] [] [] [] [] [] [] [] []      [] [] [] [] [] [] [] [] [] [] []    I=Independent, Mod I=Modified Independent, S=Supervision/Setup, SBA=Standby Assistance, CGA=Contact Guard Assistance, Min=Minimal Assistance, Mod=Moderate Assistance, Max=Maximal Assistance, Total=Total Assistance, NT=Not Tested    ACTIVITIES OF DAILY LIVING: I Mod I S SBA CGA Min Mod Max Total NT Comments   BASIC ADLs:              Upper Body Bathing  [] [] [] [] [] [] [] [] [] []     Lower Body Bathing [] [] [] [] [] [] [] [] [] []     Toileting [] [] [] [] [] [] [] [] [] []    Upper Body Dressing [] [] [] [] [] [] [] [] [] []    Lower Body Dressing [] [] [] [] [] [] [] [] [] []    Feeding [] [] [] [] [] [] [] [x] [] []    Grooming [] [] [] [] [] [] [] [x] [] []    Personal Device Care [] [] [] [] [] [] [] [] [] []    Functional Mobility [] [] [] [] [] [] [] [x] [] [] x2   I=Independent, Mod I=Modified Independent, S=Supervision/Setup, SBA=Standby Assistance, CGA=Contact Guard Assistance, Min=Minimal Assistance, Mod=Moderate Assistance, Max=Maximal Assistance, Total=Total Assistance, NT=Not Tested    PLAN:   FREQUENCY/DURATION   OT Plan of Care: 3 times/week for duration of hospital stay or until stated goals are met, whichever comes first.    PROBLEM LIST:   (Skilled intervention is medically  necessary to address:)  Decreased ADL/Functional Activities  Decreased Activity Tolerance  Decreased AROM/PROM  Decreased Balance  Decreased Cognition  Decreased Coordination  Decreased Safety Awareness  Decreased Strength  Decreased Transfer Abilities   INTERVENTIONS PLANNED:  (Benefits and precautions of occupational therapy have been discussed with the patient.)  Self Care Training  Therapeutic Activity  Therapeutic Exercise/HEP  Neuromuscular Re-education  Manual Therapy  Education         TREATMENT:     EVALUATION: MODERATE COMPLEXITY: (Untimed Charge)  The initial evaluation charge encompasses clinical chart review, objective assessment, interpretation of assessment, and skilled monitoring of the patient's response to treatment in order to develop a plan of care.     TREATMENT:   Co-Treatment between OT and PT necessary due to patient's decreased overall endurance/tolerance levels, as well as need for high level skilled assistance to complete functional transfers/mobility and functional tasks  Self Care (10 minutes): Patient participated in self feeding, grooming, and bed mobility in supported sitting and unsupported sitting with maximal visual, verbal, manual, and tactile cueing to increase independence, decrease assistance required, increase activity tolerance, and increase safety awareness. The patient was educated on role of occupational therapy and patient will need reinforcement at next session.     TREATMENT GRID:  N/A    AFTER TREATMENT PRECAUTIONS: Alarm Activated, Bed, Call light within reach, Heels floated, Needs within reach, RN notified, and Side rails x3    INTERDISCIPLINARY COLLABORATION:  RN/ PCT and PT/ PTA    EDUCATION:  Education Given To: Patient  Education Provided: Role of Therapy;Plan of Care  Barriers to Learning: Cognition    TOTAL TREATMENT DURATION AND TIME:  Time In: 1238  Time Out: 1255  Minutes: 17    Tiff Espinoza OT

## 2025-03-02 NOTE — ACP (ADVANCE CARE PLANNING)
Advance Care Planning   General Advance Care Planning (ACP) Conversation    Date of Conversation: 3/1/2025  Conducted with: Patient with Decision Making Capacity  Other persons present: None    Healthcare Decision Maker:    Primary Decision Maker: Jo Smith - Spouse - [phone]    Secondary Decision Maker: LISA SMITH - Child - [phone]    Today we documented Decision Maker(s) consistent with Legal Next of Kin hierarchy.  Content/Action Overview:  Has ACP document(s) on file - reflects the patient's care preferences  Reviewed DNR/DNI and patient elects Full Code (Attempt Resuscitation)        Length of Voluntary ACP Conversation in minutes:  <16 minutes (Non-Billable)    Raiza Bui RN

## 2025-03-02 NOTE — PROGRESS NOTES
Hospitalist Progress Note   Admit Date:  3/1/2025 12:41 AM   Name:  Polo Smith   Age:  89 y.o.  Sex:  male  :  10/9/1934   MRN:  [id number]   Room:  [facility]/    Presenting/Chief Complaint: Vomiting     Reason(s) for Admission: Anemia due to acute blood loss [D62]  GI bleed [K92.2]  History of hematemesis [Z87.19]     Hospital Course:   Polo Smith is a 89 y.o. male with medical history of  intracranial hemorrhage, hypertension, hyperlipidemia, type 2 diabetes mellitus, benign prostatic hypertrophy, atrial fibrillation, hypokalemia, UTI, stage III sacral ulcer who presented from his nursing home at [facility] with complaints of coffee-ground hematemesis.    Patient admitted for possible GI bleed.  No further episodes of emesis since admission.  GI consulted and recommend conservative management at this time.  Patient noted with sinus bradycardia, heart rate dropped to 30s with underlying A-fib rhythm.  Cardiology consulted.  Carvedilol held.  Cardiology recommended bradycardia likely multifactorial in the setting of GI bleed, metabolic encephalopathy and on beta-blocker.    Subjective & 24hr Events:   Patient is seen and examined at the bedside.  Not very talkative.  Poor historian, demented.  Reports feeling better.  No active complaint.  Hemoglobin stable.  Cardia resolved.  Heart rate in 80s to 90s.    Creatinine increased to 1.44 from 1.07 yesterday.  Hold losartan.  Will continue IV fluids.  Monitor BMP daily.    Assessment & Plan:     GI bleed:  GI consulted and recommend conservative management.  No more episodes of hematemesis since admission  Hemoglobin overall stable, no evidence of active bleeding  Monitor H&H  Continue PPI  Transfuse for hemoglobin less than 7    Bradycardia:  Paroxysmal A-fib:  Carvedilol on hold  Not on any anticoagulation due to intracranial hemorrhage  Cardiology consulted and recommend likely multifactorial in the setting of GI bleed, metabolic encephalopathy and on  beta-blocker.  Cardiology signed off.  Bradycardia resolved    Chronic diastolic heart failure:  Hypertension/hyperlipidemia:  Not in acute exacerbation  Clinically looks dry, getting IV fluids  Continue losartan and Zetia    History of DVT:  Off of Eliquis due to intracranial hemorrhage    Recent history of intracranial hemorrhage:  Continue Keppra    Sacral wound:  Wound care    History of dementia:  Noted      Anticipated Discharge Arrangements:   Skilled Nursing Facility    PT/OT evals ordered?  Therapy evals ordered  Diet:  ADULT DIET; Regular; GI [place] (GERD/Peptic Ulcer)  VTE prophylaxis: SCD's   Code status: DNR      Non-peripheral Lines and Tubes (if present):      External Urinary Catheter (Active)        Telemetry (if present):  Cardiac/Telemetry Monitor On: Bedside monitor in use, Portable telemetry pack applied        Hospital Problems:  Principal Problem:    GI bleed  Active Problems:    Chronic diastolic heart failure (HCC)    Essential hypertension    DVT (deep venous thrombosis) (HCC)    Atrial fibrillation (HCC)    Projectile vomiting    Acute blood loss anemia    Skin ulcer of sacrum (HCC)    Nursing home resident    Advanced age    DNR (do not resuscitate)    Bradycardia    History of DVT (deep vein thrombosis)    ANGEL (acute kidney injury)  Resolved Problems:    * No resolved hospital problems. *      Objective:   Patient Vitals for the past 24 hrs:   Temp Pulse Resp BP SpO2   03/02/25 0829 98.1 °F (36.7 °C) 97 18 105/64 99 %   03/02/25 0509 97.2 °F (36.2 °C) 99 20 (!) 96/57 93 %   03/02/25 0029 98.2 °F (36.8 °C) 98 24 (!) 90/53 91 %   03/01/25 2056 98.2 °F (36.8 °C) 97 20 98/61 93 %   03/01/25 1649 97.7 °F (36.5 °C) 94 17 128/80 93 %   03/01/25 1259 97.5 °F (36.4 °C) 90 18 129/75 95 %       Oxygen Therapy  SpO2: 99 %  Pulse via Oximetry: 89 beats per minute  O2 Device: None (Room air)    Estimated body mass index is 22.28 kg/m² as calculated from the following:    Height as of this encounter:

## 2025-03-02 NOTE — PROGRESS NOTES
Pt with more frequent burps that are now sounding moist. Pt also constantly clearing throat every couple of seconds with occasional moist/congested cough. Burps still smelling of feces and pt stating that contents that comes up into his mouth tastes bad. Pt currently nauseous and given IV Zofran. Vega Gayle, NP notified. NP also made aware that pt appears miserable and that there are concerns for aspiration of possible fecal matter. Request for placement of NGT made. \"Wait for now\" per NP.

## 2025-03-02 NOTE — PROGRESS NOTES
Pt with large watery dark brown BM. Pt states he feels much better. Pt not burping as much but is still frequently clearing his throat.

## 2025-03-02 NOTE — CARE COORDINATION
MSN, Cm:  attempted to speak with patient this AM with no success.  All below information gathered by patient's medical chart.  Patient was admitted from [facility] where he was receiving rehab services.  Patient requires help with some ADL's and does not use any equipment for ambulation.  Patient is not current with any HH, palliative care, or home oxygen.  Patient is retired and his son drives him to all appointments.  Patient was admitted for projectile vomiting (coffee ground).  GI consulted for GIB and possible EGD.  Patient also with possible aspiration with a possible need for NGT.  Referral placed for patient to return to [facility] upon discharge to finish rehab services.  PT/OT consulted for evaluation and recommendations.  Case Management will continue to follow for any discharge needs.       03/02/25 0841   Service Assessment   Patient Orientation Alert and Oriented;Person   Cognition Dementia / Early Alzheimer's   History Provided By Medical Record   Primary Caregiver Other (Comment)  ([facility] staff)   Support Systems Children   Patient's Healthcare Decision Maker is: Named in Scanned ACP Document   PCP Verified by CM Yes   Last Visit to PCP Within last 3 months   Prior Functional Level Assistance with the following:;Cooking;Housework;Mobility   Current Functional Level Other (see comment)  (PT/OT consulted)   Can patient return to prior living arrangement Yes   Ability to make needs known: Good   Family able to assist with home care needs: Yes   Would you like for me to discuss the discharge plan with any other family members/significant others, and if so, who? Yes  (Son)   Financial Resources Medicare   Community Resources Other (Comment)  ([facility] rehab)   Social/Functional History   Lives With Other (Comment)  ([facility])   Type of Home Facility   Home Layout One level   Home Access Level entry   Bathroom Shower/Tub Walk-in shower   Bathroom Toilet Handicap height

## 2025-03-02 NOTE — PROGRESS NOTES
ACUTE PHYSICAL THERAPY GOALS:   (Developed with and agreed upon by patient and/or caregiver.)  Pt will perform bed mobility Min (A)x2 c inc time, cueing and use of rails as needed in 7 therapy sessions.  Pt will perform sit-to-stand/ stand-to-sit transfers Mod (A)x2 c use of LRAD/external supports as needed and no LOB or miss-steps in 7 therapy sessions.  Pt will ambulate 5 ft Mod (A) with use of LRAD, no LOB or miss-steps and breaks as needed in 7 therapy sessions.  Pt will perform standing dynamic balance activities with minimal postural sway in 7 therapy sessions.  Pt will tolerate multiple sets and reps of BLE exercises in 7 therapy sessions.      PHYSICAL THERAPY Initial Assessment, Daily Note, and PM  (Link to Caseload Tracking: PT Visit Days : 1  Acknowledge Orders  Time In/Out  PT Charge Capture  Rehab Caseload Tracker    FALL RISK    Polo Smith is a 89 y.o. male   PRIMARY DIAGNOSIS: GI bleed  Anemia due to acute blood loss [D62]  GI bleed [K92.2]  History of hematemesis [Z87.19]       Reason for Referral: Generalized Muscle Weakness (M62.81)  Difficulty in walking, Not elsewhere classified (R26.2)  Other abnormalities of gait and mobility (R26.89)  Inpatient: Payor: MEDICARE / Plan: MEDICARE PART A AND B / Product Type: *No Product type* /     ASSESSMENT:     REHAB RECOMMENDATIONS:   Recommendation to date pending progress:  Setting:  Short-term Rehab    Equipment:    To Be Determined     ASSESSMENT:  Mr. Smith Is a 89 y.o. male presenting to PT after being admitted on 3/1/25 from [facility] for GI bleed associated with worsening fatigue and weakness. At time of initial evaluation, pt presents with significant deficits in cognition, bed mobility, strength, transfers, balance and activity tolerance limiting his overall functional mobility. Today, pt performed all mobility with Mod/Max (A)x1-2, inc time and cueing while requiring additional use of bed-rails and intermittent BUE support from  Care Training  Therapeutic Activity  Therapeutic Exercise/HEP  Gait Training  Education       TREATMENT:   EVALUATION: LOW COMPLEXITY: (Untimed Charge)  The initial evaluation charge encompasses clinical chart review, objective assessment, interpretation of assessment, and skilled monitoring of the patient's response to treatment in order to develop a plan of care.     TREATMENT:   Co-Treatment PT/OT necessary due to patient's decreased overall endurance/tolerance levels, as well as need for high level skilled assistance to complete functional transfers/mobility and functional tasks  Therapeutic Activity (23 Minutes): Therapeutic activity included Rolling, Supine to Sit, Sit to Supine, Scooting, Lateral Scooting, Transfer Training, and Sitting balance  to improve functional Activity tolerance, Balance, Mobility, and Strength.    TREATMENT GRID:  N/A    AFTER TREATMENT PRECAUTIONS: Alarm Activated, Bed, Bed/Chair Locked, Call light within reach, Needs within reach, and RN notified    INTERDISCIPLINARY COLLABORATION:  RN/ PCT, PT/ PTA, and OT/ MONTIEL    EDUCATION:    Educated patient and/or family/caregiver on the following: Fall prevention strategies and Positioning    TIME IN/OUT:  Time In: 1225  Time Out: 1250  Minutes: 25    Torsten White, PT

## 2025-03-02 NOTE — PROGRESS NOTES
2001 - Pt with frequent hiccups and moist burps that smell like feces as well as occasional complaints of nausea but no vomiting. SHANA Ferrari notified. NP also notified of pt's reason for admission as well as the conflicting descriptions of emesis at [facility] being described as coffee ground emesis vs just brown. NP notified of results of abdominal CT showing fluid filled stomach and esophagus as well as a SBO. Per NP, just give PRN antiemetics for now.

## 2025-03-03 LAB
ANION GAP SERPL CALC-SCNC: 9 MMOL/L (ref 7–16)
BUN SERPL-MCNC: 40 MG/DL (ref 8–23)
CALCIUM SERPL-MCNC: 8 MG/DL (ref 8.8–10.2)
CHLORIDE SERPL-SCNC: 108 MMOL/L (ref 98–107)
CO2 SERPL-SCNC: 23 MMOL/L (ref 20–29)
CREAT SERPL-MCNC: 0.98 MG/DL (ref 0.8–1.3)
GLUCOSE SERPL-MCNC: 84 MG/DL (ref 70–99)
POTASSIUM SERPL-SCNC: 3.6 MMOL/L (ref 3.5–5.1)
SODIUM SERPL-SCNC: 140 MMOL/L (ref 136–145)

## 2025-03-03 PROCEDURE — 6370000000 HC RX 637 (ALT 250 FOR IP): Performed by: FAMILY MEDICINE

## 2025-03-03 PROCEDURE — 1100000003 HC PRIVATE W/ TELEMETRY

## 2025-03-03 PROCEDURE — 2500000003 HC RX 250 WO HCPCS: Performed by: FAMILY MEDICINE

## 2025-03-03 PROCEDURE — 80048 BASIC METABOLIC PNL TOTAL CA: CPT

## 2025-03-03 PROCEDURE — 6360000002 HC RX W HCPCS: Performed by: FAMILY MEDICINE

## 2025-03-03 PROCEDURE — 76937 US GUIDE VASCULAR ACCESS: CPT

## 2025-03-03 PROCEDURE — 2580000003 HC RX 258: Performed by: FAMILY MEDICINE

## 2025-03-03 PROCEDURE — 36415 COLL VENOUS BLD VENIPUNCTURE: CPT

## 2025-03-03 RX ADMIN — FINASTERIDE 5 MG: 5 TABLET, FILM COATED ORAL at 08:59

## 2025-03-03 RX ADMIN — EZETIMIBE 10 MG: 10 TABLET ORAL at 08:59

## 2025-03-03 RX ADMIN — ALLOPURINOL 100 MG: 100 TABLET ORAL at 08:59

## 2025-03-03 RX ADMIN — SODIUM CHLORIDE 40 MG: 9 INJECTION INTRAMUSCULAR; INTRAVENOUS; SUBCUTANEOUS at 18:01

## 2025-03-03 RX ADMIN — LEVETIRACETAM 500 MG: 500 TABLET, FILM COATED ORAL at 20:08

## 2025-03-03 RX ADMIN — SODIUM CHLORIDE, SODIUM LACTATE, POTASSIUM CHLORIDE, AND CALCIUM CHLORIDE: 600; 310; 30; 20 INJECTION, SOLUTION INTRAVENOUS at 00:06

## 2025-03-03 RX ADMIN — SODIUM CHLORIDE 40 MG: 9 INJECTION INTRAMUSCULAR; INTRAVENOUS; SUBCUTANEOUS at 05:01

## 2025-03-03 RX ADMIN — LEVETIRACETAM 500 MG: 500 TABLET, FILM COATED ORAL at 08:59

## 2025-03-03 RX ADMIN — SODIUM CHLORIDE, PRESERVATIVE FREE 10 ML: 5 INJECTION INTRAVENOUS at 09:02

## 2025-03-03 RX ADMIN — SODIUM CHLORIDE, PRESERVATIVE FREE 10 ML: 5 INJECTION INTRAVENOUS at 20:54

## 2025-03-03 ASSESSMENT — PAIN SCALES - GENERAL
PAINLEVEL_OUTOF10: 0

## 2025-03-03 NOTE — PROGRESS NOTES
Hospitalist Progress Note   Admit Date:  3/1/2025 12:41 AM   Name:  Polo Smith   Age:  89 y.o.  Sex:  male  :  10/9/1934   MRN:  [id number]   Room:  [facility]/    Presenting/Chief Complaint: Vomiting     Reason(s) for Admission: Anemia due to acute blood loss [D62]  GI bleed [K92.2]  History of hematemesis [Z87.19]     Hospital Course:   Polo Smith is a 89 y.o. male with medical history of  intracranial hemorrhage, hypertension, hyperlipidemia, type 2 diabetes mellitus, benign prostatic hypertrophy, atrial fibrillation, hypokalemia, UTI, stage III sacral ulcer who presented from his nursing home at [facility] with complaints of coffee-ground hematemesis.    Patient admitted for possible GI bleed.  No further episodes of emesis since admission.  GI consulted and recommend conservative management at this time.  Patient noted with sinus bradycardia, heart rate dropped to 30s with underlying A-fib rhythm.  Cardiology consulted.  Carvedilol held.  Cardiology recommended bradycardia likely multifactorial in the setting of GI bleed, metabolic encephalopathy and on beta-blocker.    Subjective & 24hr Events:   Patient is seen and examined at the bedside.  Underlying dementia, not very communicative.  Reports he is feeling better.  No active signs of bleeding.  Hemoglobin stable.    Creatinine improved to 0.98 with IV fluids.  Will discontinue IV fluids.    Assessment & Plan:     GI bleed:  GI consulted and recommend conservative management.  No more episodes of hematemesis since admission  Hemoglobin overall stable, no evidence of active bleeding  Monitor H&H  Continue PPI  Transfuse for hemoglobin less than 7    ANGEL:  Creatinine increased to 1.44 from 1.07 on 3/2  Resolved with IV fluids  Monitor BMP daily  Avoid nephrotoxic agent    Bradycardia:  Paroxysmal A-fib:  Carvedilol on hold  Not on any anticoagulation due to intracranial hemorrhage  Cardiology consulted and recommend likely multifactorial in the setting of  Iron % Saturation 8 (L) 20 - 50 %    UIBC 156.0 112.0 - 347.0 ug/dL   Basic Metabolic Panel w/ Reflex to MG    Collection Time: 03/02/25  5:54 AM   Result Value Ref Range    Sodium 139 136 - 145 mmol/L    Potassium 4.0 3.5 - 5.1 mmol/L    Chloride 105 98 - 107 mmol/L    CO2 21 20 - 29 mmol/L    Anion Gap 13 7 - 16 mmol/L    Glucose 87 70 - 99 mg/dL    BUN 52 (H) 8 - 23 MG/DL    Creatinine 1.44 (H) 0.80 - 1.30 MG/DL    Est, Glom Filt Rate 46 (L) >60 ml/min/1.73m2    Calcium 7.8 (L) 8.8 - 10.2 MG/DL   Hemoglobin and Hematocrit    Collection Time: 03/02/25  5:54 AM   Result Value Ref Range    Hemoglobin 11.8 (L) 13.6 - 17.2 g/dL    Hematocrit 37.0 (L) 41.1 - 50.3 %   Basic Metabolic Panel w/ Reflex to MG    Collection Time: 03/03/25  7:21 AM   Result Value Ref Range    Sodium 140 136 - 145 mmol/L    Potassium 3.6 3.5 - 5.1 mmol/L    Chloride 108 (H) 98 - 107 mmol/L    CO2 23 20 - 29 mmol/L    Anion Gap 9 7 - 16 mmol/L    Glucose 84 70 - 99 mg/dL    BUN 40 (H) 8 - 23 MG/DL    Creatinine 0.98 0.80 - 1.30 MG/DL    Est, Glom Filt Rate 74 >60 ml/min/1.73m2    Calcium 8.0 (L) 8.8 - 10.2 MG/DL       No results for input(s): \"COVID19\" in the last 72 hours.    Current Meds:  Current Facility-Administered Medications   Medication Dose Route Frequency    pantoprazole (PROTONIX) 40 mg in sodium chloride (PF) 0.9 % 10 mL injection  40 mg IntraVENous Q12H    sodium chloride flush 0.9 % injection 5-40 mL  5-40 mL IntraVENous 2 times per day    sodium chloride flush 0.9 % injection 5-40 mL  5-40 mL IntraVENous PRN    0.9 % sodium chloride infusion   IntraVENous PRN    ondansetron (ZOFRAN) injection 4 mg  4 mg IntraVENous Q6H PRN    acetaminophen (TYLENOL) suppository 650 mg  650 mg Rectal Q6H PRN    lactated ringers infusion   IntraVENous Continuous    allopurinol (ZYLOPRIM) tablet 100 mg  100 mg Oral Daily    ezetimibe (ZETIA) tablet 10 mg  10 mg Oral Daily    finasteride (PROSCAR) tablet 5 mg  5 mg Oral Daily    levETIRAcetam

## 2025-03-03 NOTE — WOUND CARE
Consulted for Sacral DTI. Familiar to Wound team from previous admission, last seen 2/5.      Sacrum DTI w/ 2 open areas, largest 2x1x0.2cm, pink/red, non-blanchable purple/maroon, surrounding blanchable erythema, no drainage/odor. Recommend incontinence care or wound cleanser, pink silicone border foam every other day&PRN. Continue frequent turning/repositioning.

## 2025-03-03 NOTE — PROGRESS NOTES
Spiritual Health History and Assessment/Progress Note  [facility]    (P) Spiritual/Emotional Needs,  ,  ,      Name: Polo Smith MRN: [id number]    Age: 89 y.o.     Sex: male   Language: English   Congregation: [employer]   GI bleed     Date: 3/3/2025            Total Time Calculated: (P) 40 min              Spiritual Assessment began in [facility] 4 TELEMETRY        Referral/Consult From: (P) Rounding   Encounter Overview/Reason: (P) Spiritual/Emotional Needs  Service Provided For: (P) Patient    Jeannie, Belief, Meaning:   Patient identifies as spiritual  Family/Friends No family/friends present      Importance and Influence:  Patient has no beliefs influential to healthcare decision-making identified during this visit  Family/Friends No family/friends present    Community:  Patient feels well-supported. Support system includes: Spouse/Partner and Children  Family/Friends No family/friends present    Assessment and Plan of Care:     Patient Interventions include: Facilitated expression of thoughts and feelings, Affirmed coping skills/support systems, and Provided sacramental/Faith ritual  Family/Friends Interventions include: No family/friends present    Patient Plan of Care: Spiritual Care available upon further referral  Family/Friends Plan of Care: No family/friends present    Electronically signed by PHYLLIS MILLER on 3/3/2025 at 4:20 PM

## 2025-03-04 LAB
ANION GAP SERPL CALC-SCNC: 11 MMOL/L (ref 7–16)
BASOPHILS # BLD: 0.01 K/UL (ref 0–0.2)
BASOPHILS NFR BLD: 0.1 % (ref 0–2)
BUN SERPL-MCNC: 27 MG/DL (ref 8–23)
CALCIUM SERPL-MCNC: 8.1 MG/DL (ref 8.8–10.2)
CHLORIDE SERPL-SCNC: 106 MMOL/L (ref 98–107)
CO2 SERPL-SCNC: 22 MMOL/L (ref 20–29)
CREAT SERPL-MCNC: 0.82 MG/DL (ref 0.8–1.3)
DIFFERENTIAL METHOD BLD: ABNORMAL
EOSINOPHIL # BLD: 0.02 K/UL (ref 0–0.8)
EOSINOPHIL NFR BLD: 0.2 % (ref 0.5–7.8)
ERYTHROCYTE [DISTWIDTH] IN BLOOD BY AUTOMATED COUNT: 14.4 % (ref 11.9–14.6)
GLUCOSE SERPL-MCNC: 87 MG/DL (ref 70–99)
HCT VFR BLD AUTO: 34.7 % (ref 41.1–50.3)
HGB BLD-MCNC: 11.2 G/DL (ref 13.6–17.2)
IMM GRANULOCYTES # BLD AUTO: 0.07 K/UL (ref 0–0.5)
IMM GRANULOCYTES NFR BLD AUTO: 0.8 % (ref 0–5)
LYMPHOCYTES # BLD: 0.85 K/UL (ref 0.5–4.6)
LYMPHOCYTES NFR BLD: 9.2 % (ref 13–44)
MAGNESIUM SERPL-MCNC: 1.8 MG/DL (ref 1.8–2.4)
MCH RBC QN AUTO: 27.8 PG (ref 26.1–32.9)
MCHC RBC AUTO-ENTMCNC: 32.3 G/DL (ref 31.4–35)
MCV RBC AUTO: 86.1 FL (ref 82–102)
MONOCYTES # BLD: 0.44 K/UL (ref 0.1–1.3)
MONOCYTES NFR BLD: 4.8 % (ref 4–12)
NEUTS SEG # BLD: 7.86 K/UL (ref 1.7–8.2)
NEUTS SEG NFR BLD: 84.9 % (ref 43–78)
NRBC # BLD: 0 K/UL (ref 0–0.2)
PLATELET # BLD AUTO: 170 K/UL (ref 150–450)
PMV BLD AUTO: 9.8 FL (ref 9.4–12.3)
POTASSIUM SERPL-SCNC: 3.5 MMOL/L (ref 3.5–5.1)
RBC # BLD AUTO: 4.03 M/UL (ref 4.23–5.6)
SARS-COV-2 RDRP RESP QL NAA+PROBE: NOT DETECTED
SODIUM SERPL-SCNC: 140 MMOL/L (ref 136–145)
SOURCE: NORMAL
WBC # BLD AUTO: 9.3 K/UL (ref 4.3–11.1)

## 2025-03-04 PROCEDURE — 97530 THERAPEUTIC ACTIVITIES: CPT

## 2025-03-04 PROCEDURE — 87635 SARS-COV-2 COVID-19 AMP PRB: CPT

## 2025-03-04 PROCEDURE — 6370000000 HC RX 637 (ALT 250 FOR IP): Performed by: NURSE PRACTITIONER

## 2025-03-04 PROCEDURE — 85025 COMPLETE CBC W/AUTO DIFF WBC: CPT

## 2025-03-04 PROCEDURE — 2580000003 HC RX 258: Performed by: FAMILY MEDICINE

## 2025-03-04 PROCEDURE — 2500000003 HC RX 250 WO HCPCS: Performed by: FAMILY MEDICINE

## 2025-03-04 PROCEDURE — 80048 BASIC METABOLIC PNL TOTAL CA: CPT

## 2025-03-04 PROCEDURE — 1100000003 HC PRIVATE W/ TELEMETRY

## 2025-03-04 PROCEDURE — 36415 COLL VENOUS BLD VENIPUNCTURE: CPT

## 2025-03-04 PROCEDURE — 6360000002 HC RX W HCPCS: Performed by: FAMILY MEDICINE

## 2025-03-04 PROCEDURE — 97535 SELF CARE MNGMENT TRAINING: CPT

## 2025-03-04 PROCEDURE — 83735 ASSAY OF MAGNESIUM: CPT

## 2025-03-04 PROCEDURE — 6370000000 HC RX 637 (ALT 250 FOR IP): Performed by: FAMILY MEDICINE

## 2025-03-04 RX ORDER — PANTOPRAZOLE SODIUM 40 MG/1
40 TABLET, DELAYED RELEASE ORAL
Status: DISCONTINUED | OUTPATIENT
Start: 2025-03-05 | End: 2025-03-06 | Stop reason: HOSPADM

## 2025-03-04 RX ADMIN — SODIUM CHLORIDE, PRESERVATIVE FREE 10 ML: 5 INJECTION INTRAVENOUS at 08:18

## 2025-03-04 RX ADMIN — SODIUM CHLORIDE, PRESERVATIVE FREE 10 ML: 5 INJECTION INTRAVENOUS at 21:43

## 2025-03-04 RX ADMIN — FINASTERIDE 5 MG: 5 TABLET, FILM COATED ORAL at 08:17

## 2025-03-04 RX ADMIN — Medication 6 MG: at 21:40

## 2025-03-04 RX ADMIN — EZETIMIBE 10 MG: 10 TABLET ORAL at 08:17

## 2025-03-04 RX ADMIN — ALLOPURINOL 100 MG: 100 TABLET ORAL at 08:17

## 2025-03-04 RX ADMIN — LEVETIRACETAM 500 MG: 500 TABLET, FILM COATED ORAL at 21:40

## 2025-03-04 RX ADMIN — SODIUM CHLORIDE 40 MG: 9 INJECTION INTRAMUSCULAR; INTRAVENOUS; SUBCUTANEOUS at 04:42

## 2025-03-04 RX ADMIN — LEVETIRACETAM 500 MG: 500 TABLET, FILM COATED ORAL at 08:17

## 2025-03-04 ASSESSMENT — PAIN SCALES - GENERAL
PAINLEVEL_OUTOF10: 0

## 2025-03-04 NOTE — PLAN OF CARE
Problem: Chronic Conditions and Co-morbidities  Goal: Patient's chronic conditions and co-morbidity symptoms are monitored and maintained or improved  Outcome: Progressing     Problem: Discharge Planning  Goal: Discharge to home or other facility with appropriate resources  Outcome: Progressing     Problem: Skin/Tissue Integrity  Goal: Skin integrity remains intact  Description: 1.  Monitor for areas of redness and/or skin breakdown  2.  Assess vascular access sites hourly  3.  Every 4-6 hours minimum:  Change oxygen saturation probe site  4.  Every 4-6 hours:  If on nasal continuous positive airway pressure, respiratory therapy assess nares and determine need for appliance change or resting period  Recent Flowsheet Documentation  Taken 3/3/2025 2004 by Nayely John, RN  Skin Integrity Remains Intact:   Monitor for areas of redness and/or skin breakdown   Every 4-6 hours minimum: Change oxygen saturation probe site   Every 4-6 hours: If on nasal continuous positive airway pressure, respiratory therapy assesses nares and determine need for appliance change or resting period

## 2025-03-04 NOTE — PLAN OF CARE
Problem: Chronic Conditions and Co-morbidities  Goal: Patient's chronic conditions and co-morbidity symptoms are monitored and maintained or improved  Outcome: Progressing     Problem: Discharge Planning  Goal: Discharge to home or other facility with appropriate resources  Outcome: Progressing     Problem: Safety - Adult  Goal: Free from fall injury  Outcome: Progressing     Problem: Pain  Goal: Verbalizes/displays adequate comfort level or baseline comfort level  Outcome: Progressing     Problem: Skin/Tissue Integrity  Goal: Skin integrity remains intact  Description: 1.  Monitor for areas of redness and/or skin breakdown  2.  Assess vascular access sites hourly  3.  Every 4-6 hours minimum:  Change oxygen saturation probe site  4.  Every 4-6 hours:  If on nasal continuous positive airway pressure, respiratory therapy assess nares and determine need for appliance change or resting period  Outcome: Progressing     Problem: ABCDS Injury Assessment  Goal: Absence of physical injury  Outcome: Progressing     Problem: Neurosensory - Adult  Goal: Achieves stable or improved neurological status  Outcome: Progressing  Goal: Absence of seizures  Outcome: Progressing  Goal: Achieves maximal functionality and self care  Outcome: Progressing     Problem: Cardiovascular - Adult  Goal: Maintains optimal cardiac output and hemodynamic stability  Outcome: Progressing     Problem: Musculoskeletal - Adult  Goal: Return mobility to safest level of function  Outcome: Progressing  Goal: Return ADL status to a safe level of function  Outcome: Progressing

## 2025-03-04 NOTE — PROGRESS NOTES
ACUTE OCCUPATIONAL THERAPY GOALS:   (Developed with and agreed upon by patient and/or caregiver.)  1. Pt will toilet with mod A   2. Pt will complete functional mobility for ADLs with mod A using AD as needed  3. Pt will complete upper body bathing and dressing with min A using AE as needed  4. Pt will complete grooming and feeding with min A  5.  Pt will tolerate 23 minutes functional activity with min or fewer rest breaks to promote increased endurance for ADLs        Timeframe: 7 visits       OCCUPATIONAL THERAPY: Daily Note PM   OT Visit Days: 2   Time In/Out  OT Charge Capture  Rehab Caseload Tracker  OT Orders    Polo Smith is a 89 y.o. male   PRIMARY DIAGNOSIS: GI bleed  Anemia due to acute blood loss [D62]  GI bleed [K92.2]  History of hematemesis [Z87.19]       Inpatient: Payor: MEDICARE / Plan: MEDICARE PART A AND B / Product Type: *No Product type* /     ASSESSMENT:     REHAB RECOMMENDATIONS:   Recommendation to date pending progress:  Setting:  Short-term Rehab    Equipment:    To Be Determined     ASSESSMENT:  Mr. Smith is doing fair today. Pt presents supine and agreeable to therapy. Pt overall min-mod Ax2 with bed mobility, functional transfers and mobility short distance from bed to chair with RW. Pt completed grooming task seated in bedside chair with assist. Pt required increased time for all tasks. Pt continues to have deficits in strength, balance, functional mobility, and activity tolerance. Steady progress this date. Continue OT POC.        SUBJECTIVE:     Mr. Smith states, \"I don't really like being told what to do\"     Social/Functional Lives With: Other (Comment) ([facility])  Type of Home: Facility  Home Layout: One level  Home Access: Level entry  Bathroom Shower/Tub: Walk-in shower  Bathroom Toilet: Handicap height  Bathroom Equipment: Grab bars in shower, Shower chair, Grab bars around toilet  Bathroom Accessibility: Wheelchair accessible  Home Equipment: None  Receives  Help From: Family  Prior Level of Assist for ADLs: Needs assistance  Prior Level of Assist for Homemaking: Needs assistance  Homemaking Responsibilities: Yes  Prior Level of Assist for Transfers: Needs assistance  Active : No  Patient's  Info: family  Occupation: Retired    OBJECTIVE:     LINES / DRAINS / AIRWAY: None    RESTRICTIONS/PRECAUTIONS:  Restrictions/Precautions  Restrictions/Precautions: Fall Risk        PAIN: VITALS / O2:   Pre Treatment:    Did not c/o pain        Post Treatment: same Vitals          Oxygen        MOBILITY: I Mod I S SBA CGA Min Mod Max Total  NT x2 Comments:   Bed Mobility    Rolling [] [] [] [] [] [] [] [] [] [x] []    Supine to Sit [] [] [] [] [] [] [x] [] [] [] [x]    Scooting [] [] [] [] [] [] [x] [] [] [] []    Sit to Supine [] [] [] [] [] [] [] [] [] [x] []    Transfers    Sit to Stand [] [] [] [] [] [x] [] [] [] [] [x]    Bed to Chair [] [] [] [] [] [x] [] [] [] [] [x] RW   Stand to Sit [] [] [] [] [] [x] [] [] [] [] [x]    Tub/Shower [] [] [] [] [] [] [] [] [] [x] []     Toilet [] [] [] [] [] [] [] [] [] [x] []      [] [] [] [] [] [] [] [] [] [] []    I=Independent, Mod I=Modified Independent, S=Supervision/Setup, SBA=Standby Assistance, CGA=Contact Guard Assistance, Min=Minimal Assistance, Mod=Moderate Assistance, Max=Maximal Assistance, Total=Total Assistance, NT=Not Tested    ACTIVITIES OF DAILY LIVING: I Mod I S SBA CGA Min Mod Max Total NT Comments   BASIC ADLs:              Upper Body   Bathing [] [] [] [] [] [] [] [] [] [x]     Lower Body Bathing [] [] [] [] [] [] [] [] [] [x]     Toileting [] [] [] [] [] [] [] [] [] [x]    Upper Body Dressing [] [] [] [] [] [] [] [] [] [x]    Lower Body Dressing [] [] [] [] [] [] [] [] [] [x]    Feeding [] [] [] [] [] [] [] [] [] [x]    Grooming [] [] [] [] [] [x] [] [] [] [] Seated, oral care   Personal Device Care [] [] [] [] [] [] [] [] [] [x]    Functional Mobility [] [] [] [] [] [x] [] [] [] [] X2, rolling walker,

## 2025-03-04 NOTE — PROGRESS NOTES
IP Consult to Vascular Access Team  Consult performed by: Luther Mejía RN  Consult ordered by: Landon Marrero MD       US Guided PIV access-    Ultrasound was used to find the vein which was compressible and without any ultrasound features of an artery or nerve bundle. Skin was cleaned and disinfected prior to IV puncture.  Under real-time ultrasound guidance peripheral access was obtained in the right forearm using 22 G 1.75\" Peripheral IV catheter after 1 attempt(s). Blood return was present and IV flushed without difficulty with no clinical signs of infiltration. IV dressing applied and no immediate complications noted. Patient tolerated the procedure well.

## 2025-03-04 NOTE — PROGRESS NOTES
Hospitalist Progress Note   Admit Date:  3/1/2025 12:41 AM   Name:  Polo Smith   Age:  89 y.o.  Sex:  male  :  10/9/1934   MRN:  [id number]   Room:  [facility]/    Presenting/Chief Complaint: Vomiting     Reason(s) for Admission: Anemia due to acute blood loss [D62]  GI bleed [K92.2]  History of hematemesis [Z87.19]     Hospital Course:   Polo Smith is a 89 y.o. male with medical history of  intracranial hemorrhage, hypertension, hyperlipidemia, type 2 diabetes mellitus, benign prostatic hypertrophy, atrial fibrillation, hypokalemia, UTI, stage III sacral ulcer who presented from his nursing home at [facility] with complaints of coffee-ground hematemesis.    Patient admitted for possible GI bleed.  No further episodes of emesis since admission.  GI consulted and recommend conservative management at this time.  Patient noted with sinus bradycardia, heart rate dropped to 30s with underlying A-fib rhythm.  Cardiology consulted.  Carvedilol held.  Cardiology recommended bradycardia likely multifactorial in the setting of GI bleed, metabolic encephalopathy and on beta-blocker.    Subjective & 24hr Events:   Patient is seen and examined at the bedside.  Eating breakfast.  No active complaint.  Denies nausea, vomiting, chest pain or palpitation.    Patient needs to go back to the facility, but no bed available today.  Medically stable to discharge.    Assessment & Plan:     GI bleed:  GI consulted and recommend conservative management.  No more episodes of hematemesis since admission. Hemoglobin overall stable, no evidence of active bleeding  Monitor H&H  Continue PPI  Transfuse for hemoglobin less than 7    ANGEL:  Creatinine increased to 1.44 from 1.07 on 3/2  Resolved with IV fluids  Monitor BMP daily  Avoid nephrotoxic agent    Bradycardia:  Paroxysmal A-fib:  Carvedilol on hold  Not on any anticoagulation due to intracranial hemorrhage  Cardiology consulted and recommend likely multifactorial in the setting of GI  mg  5 mg Oral Daily    levETIRAcetam (KEPPRA) tablet 500 mg  500 mg Oral BID    [Held by provider] losartan (COZAAR) tablet 100 mg  100 mg Oral Daily       Signed:  RICARDO MARIN MD    Part of this note may have been written by using a voice dictation software.  The note has been proof read but may still contain some grammatical/other typographical errors.

## 2025-03-05 LAB
ANION GAP SERPL CALC-SCNC: 9 MMOL/L (ref 7–16)
BASOPHILS # BLD: 0.01 K/UL (ref 0–0.2)
BASOPHILS NFR BLD: 0.2 % (ref 0–2)
BUN SERPL-MCNC: 16 MG/DL (ref 8–23)
CALCIUM SERPL-MCNC: 8.2 MG/DL (ref 8.8–10.2)
CHLORIDE SERPL-SCNC: 104 MMOL/L (ref 98–107)
CO2 SERPL-SCNC: 25 MMOL/L (ref 20–29)
CREAT SERPL-MCNC: 0.7 MG/DL (ref 0.8–1.3)
DIFFERENTIAL METHOD BLD: ABNORMAL
EOSINOPHIL # BLD: 0.03 K/UL (ref 0–0.8)
EOSINOPHIL NFR BLD: 0.5 % (ref 0.5–7.8)
ERYTHROCYTE [DISTWIDTH] IN BLOOD BY AUTOMATED COUNT: 14.1 % (ref 11.9–14.6)
GLUCOSE SERPL-MCNC: 117 MG/DL (ref 70–99)
HCT VFR BLD AUTO: 35.3 % (ref 41.1–50.3)
HGB BLD-MCNC: 11.4 G/DL (ref 13.6–17.2)
IMM GRANULOCYTES # BLD AUTO: 0.03 K/UL (ref 0–0.5)
IMM GRANULOCYTES NFR BLD AUTO: 0.5 % (ref 0–5)
LYMPHOCYTES # BLD: 0.98 K/UL (ref 0.5–4.6)
LYMPHOCYTES NFR BLD: 16.3 % (ref 13–44)
MAGNESIUM SERPL-MCNC: 1.8 MG/DL (ref 1.8–2.4)
MCH RBC QN AUTO: 27.8 PG (ref 26.1–32.9)
MCHC RBC AUTO-ENTMCNC: 32.3 G/DL (ref 31.4–35)
MCV RBC AUTO: 86.1 FL (ref 82–102)
MONOCYTES # BLD: 0.44 K/UL (ref 0.1–1.3)
MONOCYTES NFR BLD: 7.3 % (ref 4–12)
NEUTS SEG # BLD: 4.52 K/UL (ref 1.7–8.2)
NEUTS SEG NFR BLD: 75.2 % (ref 43–78)
NRBC # BLD: 0 K/UL (ref 0–0.2)
PLATELET # BLD AUTO: 160 K/UL (ref 150–450)
PMV BLD AUTO: 9.8 FL (ref 9.4–12.3)
POTASSIUM SERPL-SCNC: 3.4 MMOL/L (ref 3.5–5.1)
RBC # BLD AUTO: 4.1 M/UL (ref 4.23–5.6)
SODIUM SERPL-SCNC: 138 MMOL/L (ref 136–145)
WBC # BLD AUTO: 6 K/UL (ref 4.3–11.1)

## 2025-03-05 PROCEDURE — 80048 BASIC METABOLIC PNL TOTAL CA: CPT

## 2025-03-05 PROCEDURE — 6370000000 HC RX 637 (ALT 250 FOR IP): Performed by: FAMILY MEDICINE

## 2025-03-05 PROCEDURE — 97112 NEUROMUSCULAR REEDUCATION: CPT

## 2025-03-05 PROCEDURE — 83735 ASSAY OF MAGNESIUM: CPT

## 2025-03-05 PROCEDURE — 6370000000 HC RX 637 (ALT 250 FOR IP): Performed by: NURSE PRACTITIONER

## 2025-03-05 PROCEDURE — 2500000003 HC RX 250 WO HCPCS: Performed by: FAMILY MEDICINE

## 2025-03-05 PROCEDURE — 36415 COLL VENOUS BLD VENIPUNCTURE: CPT

## 2025-03-05 PROCEDURE — 97530 THERAPEUTIC ACTIVITIES: CPT

## 2025-03-05 PROCEDURE — 85025 COMPLETE CBC W/AUTO DIFF WBC: CPT

## 2025-03-05 PROCEDURE — 1100000003 HC PRIVATE W/ TELEMETRY

## 2025-03-05 RX ORDER — POTASSIUM CHLORIDE 1500 MG/1
40 TABLET, EXTENDED RELEASE ORAL ONCE
Status: COMPLETED | OUTPATIENT
Start: 2025-03-05 | End: 2025-03-05

## 2025-03-05 RX ADMIN — SODIUM CHLORIDE, PRESERVATIVE FREE 10 ML: 5 INJECTION INTRAVENOUS at 08:56

## 2025-03-05 RX ADMIN — Medication 6 MG: at 20:19

## 2025-03-05 RX ADMIN — SODIUM CHLORIDE, PRESERVATIVE FREE 10 ML: 5 INJECTION INTRAVENOUS at 20:31

## 2025-03-05 RX ADMIN — POTASSIUM CHLORIDE 40 MEQ: 1500 TABLET, EXTENDED RELEASE ORAL at 20:19

## 2025-03-05 RX ADMIN — LEVETIRACETAM 500 MG: 500 TABLET, FILM COATED ORAL at 20:18

## 2025-03-05 ASSESSMENT — PAIN SCALES - GENERAL
PAINLEVEL_OUTOF10: 0

## 2025-03-05 NOTE — PROGRESS NOTES
ACUTE OCCUPATIONAL THERAPY GOALS:   (Developed with and agreed upon by patient and/or caregiver.)  1. Pt will toilet with mod A   2. Pt will complete functional mobility for ADLs with mod A using AD as needed  3. Pt will complete upper body bathing and dressing with min A using AE as needed  4. Pt will complete grooming and feeding with min A  5.  Pt will tolerate 23 minutes functional activity with min or fewer rest breaks to promote increased endurance for ADLs     Timeframe: 7 visits       OCCUPATIONAL THERAPY: Daily Note   OT Visit Days: 3   Time In/Out  OT Charge Capture  Rehab Caseload Tracker  OT Orders    Polo Smith is a 89 y.o. male   PRIMARY DIAGNOSIS: GI bleed  Anemia due to acute blood loss [D62]  GI bleed [K92.2]  History of hematemesis [Z87.19]       Inpatient: Payor: MEDICARE / Plan: MEDICARE PART A AND B / Product Type: *No Product type* /     ASSESSMENT:     REHAB RECOMMENDATIONS:   Recommendation to date pending progress:  Setting:  Short-term Rehab    Equipment:    To Be Determined     ASSESSMENT:  Mr. Smith is slowly progressing towards OT goals. Today, pt was received supine in bed--confused though participated. Completed bed mobility with modA x2. MaxA for LB dressing. Sit>stand and transfer to chair with rolling walker min-modA x2. Pt's lunch tray present--further mobility/bADLs deferred to allow pt to eat while food was warm. Recommend STR at discharge. Mr. Smith continues to demonstrate overall deficits in strength, balance, activity tolerance, and ADL performance. Continue OT efforts and POC in order to address functional deficits and OT goals stated above.        SUBJECTIVE:     Mr. Smith states, \"you can learn a lot by looking at people\"     Social/Functional Lives With: Other (Comment) ([facility])  Type of Home: Facility  Home Layout: One level  Home Access: Level entry  Bathroom Shower/Tub: Walk-in shower  Bathroom Toilet: Handicap height  Bathroom Equipment: Grab bars  [] [] [] [] [] [] [] [] [x]    Personal Device Care [] [] [] [] [] [] [] [] [] [x]    Functional Mobility [] [] [] [] [] [x] [x] [] [] [] X2, rolling walker, bed>chair   I=Independent, Mod I=Modified Independent, S=Supervision/Setup, SBA=Standby Assistance, CGA=Contact Guard Assistance, Min=Minimal Assistance, Mod=Moderate Assistance, Max=Maximal Assistance, Total=Total Assistance, NT=Not Tested    BALANCE: Good Fair+ Fair Fair- Poor NT Comments   Sitting Static [] [x] [] [] [] []    Sitting Dynamic [] [] [x] [] [] []              Standing Static [] [] [x] [] [] []    Standing Dynamic [] [] [] [x] [] []        PLAN:     FREQUENCY/DURATION   OT Plan of Care: 3 times/week for duration of hospital stay or until stated goals are met, whichever comes first.    TREATMENT:     TREATMENT:   Co-Treatment between OT and PT necessary due to patient's decreased overall endurance/tolerance levels, as well as need for high level skilled assistance to complete functional transfers/mobility and functional tasks  Neuromuscular Re-education (18 Minutes): Patient participated in neuromuscular re-education including functional reaching, weight shifting, postural training, midline training, standing tolerance activity , and sitting balance activity   with minimal verbal cues and tactile cues to improve sitting balance, standing balance, posture, coordination, static balance, and dynamic balance in order to prepare for functional task, prepare for standing ADLs, prepare for functional transfer, increase safety awareness, and prepare for self care..     TREATMENT GRID:  N/A    AFTER TREATMENT PRECAUTIONS: Alarm Activated, Call light within reach, Chair, Needs within reach, and RN notified    INTERDISCIPLINARY COLLABORATION:  RN/ PCT, PT/ PTA, and OT/ MONTIEL    EDUCATION:       TOTAL TREATMENT DURATION AND TIME:  Time In: 1140  Time Out: 1158  Minutes: 18    Effie Mike OT

## 2025-03-05 NOTE — CARE COORDINATION
CM informed patient medically stable for discharge. Awaiting bed at [facility].   MONI contacted Annamaria. Annamaria states bed is available on 3/6. Covid result is accepted for up to 72 hrs.   CM informed patient.  1530 CM contacted son/VALEIRANO Bowen [phone], per spouse's request, to inform patient's of bed availability on 3/6. Andrés is agreeable. Andrés requests a ambulance transport with understanding that insurance may not cover entire expense. DNR for ambulance transport obtained. 2nd important letter signed.  [facility] [[email or web address]] scheduled for 3/6 for 1000.

## 2025-03-05 NOTE — PROGRESS NOTES
"Anesthesia Transfer of Care Note    Patient: Clarence Reina    Procedure(s) Performed: Procedure(s) (LRB):  ULTRASOUND-ENDOSCOPIC-UPPER (N/A)  ERCP (N/A)    Patient location: PACU    Anesthesia Type: general    Transport from OR: Transported from OR on room air with adequate spontaneous ventilation    Post pain: adequate analgesia    Post assessment: no apparent anesthetic complications    Post vital signs: stable    Level of consciousness: awake, alert and oriented    Nausea/Vomiting: no nausea/vomiting    Complications: none          Last vitals:   Visit Vitals    BP (!) 119/58    Pulse 78    Temp 36.6 °C (97.8 °F)    Resp 12    Ht 5' 8" (1.727 m)    Wt 105.7 kg (233 lb)    SpO2 (!) 92%    BMI 35.43 kg/m2     " encounter: 1.829 m (6').    Weight as of this encounter: 69 kg (152 lb 1.6 oz).    Intake/Output Summary (Last 24 hours) at 3/5/2025 1121  Last data filed at 3/5/2025 0749  Gross per 24 hour   Intake 320 ml   Output 1980 ml   Net -1660 ml         Physical Exam:     General:    Chronically ill-appearing  Head:  Normocephalic, atraumatic  Eyes:  Sclerae appear normal.  Pupils equally round.  ENT:  Nares appear normal.  Moist oral mucosa  Neck:  No restricted ROM.  Trachea midline   CV:   RRR.  No m/r/g.  No jugular venous distension.  Lungs:   CTAB.  No wheezing, rhonchi, or rales.  Symmetric expansion.  Abdomen:   Soft, nontender, nondistended.  Extremities: No cyanosis or clubbing.  No edema  Skin:     No rashes.  Normal coloration.   Warm and dry.    Neuro:  CN II-XII grossly intact.    Psych:  Normal mood and affect.  Underlying dementia    I have personally reviewed labs and tests:  Recent Labs:  Recent Results (from the past 48 hour(s))   Basic Metabolic Panel w/ Reflex to MG    Collection Time: 03/04/25  4:34 AM   Result Value Ref Range    Sodium 140 136 - 145 mmol/L    Potassium 3.5 3.5 - 5.1 mmol/L    Chloride 106 98 - 107 mmol/L    CO2 22 20 - 29 mmol/L    Anion Gap 11 7 - 16 mmol/L    Glucose 87 70 - 99 mg/dL    BUN 27 (H) 8 - 23 MG/DL    Creatinine 0.82 0.80 - 1.30 MG/DL    Est, Glom Filt Rate 84 >60 ml/min/1.73m2    Calcium 8.1 (L) 8.8 - 10.2 MG/DL   CBC with Auto Differential    Collection Time: 03/04/25  4:34 AM   Result Value Ref Range    WBC 9.3 4.3 - 11.1 K/uL    RBC 4.03 (L) 4.23 - 5.6 M/uL    Hemoglobin 11.2 (L) 13.6 - 17.2 g/dL    Hematocrit 34.7 (L) 41.1 - 50.3 %    MCV 86.1 82 - 102 FL    MCH 27.8 26.1 - 32.9 PG    MCHC 32.3 31.4 - 35.0 g/dL    RDW 14.4 11.9 - 14.6 %    Platelets 170 150 - 450 K/uL    MPV 9.8 9.4 - 12.3 FL    nRBC 0.00 0.0 - 0.2 K/uL    Differential Type AUTOMATED      Neutrophils % 84.9 (H) 43.0 - 78.0 %    Lymphocytes % 9.2 (L) 13.0 - 44.0 %    Monocytes % 4.8 4.0 - 12.0 %     Eosinophils % 0.2 (L) 0.5 - 7.8 %    Basophils % 0.1 0.0 - 2.0 %    Immature Granulocytes % 0.8 0.0 - 5.0 %    Neutrophils Absolute 7.86 1.70 - 8.20 K/UL    Lymphocytes Absolute 0.85 0.50 - 4.60 K/UL    Monocytes Absolute 0.44 0.10 - 1.30 K/UL    Eosinophils Absolute 0.02 0.00 - 0.80 K/UL    Basophils Absolute 0.01 0.00 - 0.20 K/UL    Immature Granulocytes Absolute 0.07 0.0 - 0.5 K/UL   Magnesium    Collection Time: 03/04/25  4:34 AM   Result Value Ref Range    Magnesium 1.8 1.8 - 2.4 mg/dL   COVID-19, Rapid    Collection Time: 03/04/25 11:40 AM    Specimen: Nasopharyngeal   Result Value Ref Range    Source NASAL      SARS-CoV-2, Rapid Not detected NOTD     Basic Metabolic Panel w/ Reflex to MG    Collection Time: 03/05/25  4:29 AM   Result Value Ref Range    Sodium 138 136 - 145 mmol/L    Potassium 3.4 (L) 3.5 - 5.1 mmol/L    Chloride 104 98 - 107 mmol/L    CO2 25 20 - 29 mmol/L    Anion Gap 9 7 - 16 mmol/L    Glucose 117 (H) 70 - 99 mg/dL    BUN 16 8 - 23 MG/DL    Creatinine 0.70 (L) 0.80 - 1.30 MG/DL    Est, Glom Filt Rate 88 >60 ml/min/1.73m2    Calcium 8.2 (L) 8.8 - 10.2 MG/DL   CBC with Auto Differential    Collection Time: 03/05/25  4:29 AM   Result Value Ref Range    WBC 6.0 4.3 - 11.1 K/uL    RBC 4.10 (L) 4.23 - 5.6 M/uL    Hemoglobin 11.4 (L) 13.6 - 17.2 g/dL    Hematocrit 35.3 (L) 41.1 - 50.3 %    MCV 86.1 82 - 102 FL    MCH 27.8 26.1 - 32.9 PG    MCHC 32.3 31.4 - 35.0 g/dL    RDW 14.1 11.9 - 14.6 %    Platelets 160 150 - 450 K/uL    MPV 9.8 9.4 - 12.3 FL    nRBC 0.00 0.0 - 0.2 K/uL    Differential Type AUTOMATED      Neutrophils % 75.2 43.0 - 78.0 %    Lymphocytes % 16.3 13.0 - 44.0 %    Monocytes % 7.3 4.0 - 12.0 %    Eosinophils % 0.5 0.5 - 7.8 %    Basophils % 0.2 0.0 - 2.0 %    Immature Granulocytes % 0.5 0.0 - 5.0 %    Neutrophils Absolute 4.52 1.70 - 8.20 K/UL    Lymphocytes Absolute 0.98 0.50 - 4.60 K/UL    Monocytes Absolute 0.44 0.10 - 1.30 K/UL    Eosinophils Absolute 0.03 0.00 - 0.80 K/UL     Basophils Absolute 0.01 0.00 - 0.20 K/UL    Immature Granulocytes Absolute 0.03 0.0 - 0.5 K/UL   Magnesium    Collection Time: 03/05/25  4:29 AM   Result Value Ref Range    Magnesium 1.8 1.8 - 2.4 mg/dL       Recent Labs     03/04/25  1140   COVID19 Not detected       Current Meds:  Current Facility-Administered Medications   Medication Dose Route Frequency    potassium chloride (KLOR-CON M) extended release tablet 40 mEq  40 mEq Oral Once    pantoprazole (PROTONIX) tablet 40 mg  40 mg Oral QAM AC    melatonin tablet 6 mg  6 mg Oral Nightly PRN    sodium chloride flush 0.9 % injection 5-40 mL  5-40 mL IntraVENous 2 times per day    sodium chloride flush 0.9 % injection 5-40 mL  5-40 mL IntraVENous PRN    0.9 % sodium chloride infusion   IntraVENous PRN    ondansetron (ZOFRAN) injection 4 mg  4 mg IntraVENous Q6H PRN    acetaminophen (TYLENOL) suppository 650 mg  650 mg Rectal Q6H PRN    allopurinol (ZYLOPRIM) tablet 100 mg  100 mg Oral Daily    ezetimibe (ZETIA) tablet 10 mg  10 mg Oral Daily    finasteride (PROSCAR) tablet 5 mg  5 mg Oral Daily    levETIRAcetam (KEPPRA) tablet 500 mg  500 mg Oral BID    [Held by provider] losartan (COZAAR) tablet 100 mg  100 mg Oral Daily       Signed:  RICARDO MARIN MD    Part of this note may have been written by using a voice dictation software.  The note has been proof read but may still contain some grammatical/other typographical errors.

## 2025-03-05 NOTE — PLAN OF CARE
Problem: Discharge Planning  Goal: Discharge to home or other facility with appropriate resources  3/4/2025 1559 by Debra Brush, RN  Outcome: Progressing     Problem: Safety - Adult  Goal: Free from fall injury  3/4/2025 1559 by Debra Brush, RN  Outcome: Progressing     Problem: Pain  Goal: Verbalizes/displays adequate comfort level or baseline comfort level  3/4/2025 1559 by Debra Brush, RN  Outcome: Progressing

## 2025-03-05 NOTE — PROGRESS NOTES
ACUTE PHYSICAL THERAPY GOALS:   (Developed with and agreed upon by patient and/or caregiver.)  Pt will perform bed mobility Min (A)x2 c inc time, cueing and use of rails as needed in 7 therapy sessions.  Pt will perform sit-to-stand/ stand-to-sit transfers Mod (A)x2 c use of LRAD/external supports as needed and no LOB or miss-steps in 7 therapy sessions.  Pt will ambulate 5 ft Mod (A) with use of LRAD, no LOB or miss-steps and breaks as needed in 7 therapy sessions.  Pt will perform standing dynamic balance activities with minimal postural sway in 7 therapy sessions.  Pt will tolerate multiple sets and reps of BLE exercises in 7 therapy sessions.          PHYSICAL THERAPY: Daily Note AM   (Link to Caseload Tracking: PT Visit Days : 3  Time In/Out PT Charge Capture  Rehab Caseload Tracker  Orders    Polo Smith is a 89 y.o. male   PRIMARY DIAGNOSIS: GI bleed  Anemia due to acute blood loss [D62]  GI bleed [K92.2]  History of hematemesis [Z87.19]       Inpatient: Payor: MEDICARE / Plan: MEDICARE PART A AND B / Product Type: *No Product type* /     ASSESSMENT:     REHAB RECOMMENDATIONS:   Recommendation to date pending progress:  Setting:  Short-term Rehab    Equipment:    To Be Determined     ASSESSMENT:  Mr. Smith was supine in bed on arrival and agreeable to therapy. Supine to sit on edge of bed with mod A x2. He was able to transfer over to chair with min A x2. Pt left sitting up in chair with needs in reach and lunch tray in front of him. Continues to be confused.      SUBJECTIVE:   Mr. Smith states, \"I like to people watch\"     Social/Functional Lives With: Other (Comment) ([facility])  Type of Home: Facility  Home Layout: One level  Home Access: Level entry  Bathroom Shower/Tub: Walk-in shower  Bathroom Toilet: Handicap height  Bathroom Equipment: Grab bars in shower, Shower chair, Grab bars around toilet  Bathroom Accessibility: Wheelchair accessible  Home Equipment: None  Receives Help From:  Assistance, NT=Not Tested    PLAN:   FREQUENCY AND DURATION: 3 times/week for duration of hospital stay or until stated goals are met, whichever comes first.    TREATMENT:   TREATMENT:   Therapeutic Activity (18 Minutes): Therapeutic activity included Supine to Sit, Scooting, Transfer Training, Sitting balance , and Standing balance to improve functional Activity tolerance, Balance, and Mobility.    TREATMENT GRID:  N/A    AFTER TREATMENT PRECAUTIONS: Bed/Chair Locked, Call light within reach, Chair, and Needs within reach    INTERDISCIPLINARY COLLABORATION:  PT/ PTA and OT/ MONTIEL    EDUCATION:      TIME IN/OUT:  Time In: 1140  Time Out: 1158  Minutes: 18    Myrna Krishnan PTA

## 2025-03-05 NOTE — PROGRESS NOTES
Patient refused all medications this morning. Tried multiple times to give them and explained what each one is but he still refused. Pt states \"I'm tired of people telling me what to eat and what to take.\" Notified MD d/t one medication is anti-seizure.

## 2025-03-06 VITALS
RESPIRATION RATE: 18 BRPM | SYSTOLIC BLOOD PRESSURE: 126 MMHG | WEIGHT: 149.3 LBS | TEMPERATURE: 97.1 F | BODY MASS INDEX: 20.22 KG/M2 | OXYGEN SATURATION: 99 % | HEIGHT: 72 IN | DIASTOLIC BLOOD PRESSURE: 81 MMHG | HEART RATE: 85 BPM

## 2025-03-06 LAB
BASOPHILS # BLD: 0.01 K/UL (ref 0–0.2)
BASOPHILS NFR BLD: 0.1 % (ref 0–2)
DIFFERENTIAL METHOD BLD: ABNORMAL
EOSINOPHIL # BLD: 0.04 K/UL (ref 0–0.8)
EOSINOPHIL NFR BLD: 0.6 % (ref 0.5–7.8)
ERYTHROCYTE [DISTWIDTH] IN BLOOD BY AUTOMATED COUNT: 14.4 % (ref 11.9–14.6)
HCT VFR BLD AUTO: 35.4 % (ref 41.1–50.3)
HGB BLD-MCNC: 11.5 G/DL (ref 13.6–17.2)
IMM GRANULOCYTES # BLD AUTO: 0.05 K/UL (ref 0–0.5)
IMM GRANULOCYTES NFR BLD AUTO: 0.7 % (ref 0–5)
LYMPHOCYTES # BLD: 0.89 K/UL (ref 0.5–4.6)
LYMPHOCYTES NFR BLD: 12.4 % (ref 13–44)
MCH RBC QN AUTO: 27.6 PG (ref 26.1–32.9)
MCHC RBC AUTO-ENTMCNC: 32.5 G/DL (ref 31.4–35)
MCV RBC AUTO: 84.9 FL (ref 82–102)
MONOCYTES # BLD: 0.54 K/UL (ref 0.1–1.3)
MONOCYTES NFR BLD: 7.5 % (ref 4–12)
NEUTS SEG # BLD: 5.65 K/UL (ref 1.7–8.2)
NEUTS SEG NFR BLD: 78.7 % (ref 43–78)
NRBC # BLD: 0 K/UL (ref 0–0.2)
PLATELET # BLD AUTO: 163 K/UL (ref 150–450)
PMV BLD AUTO: 10 FL (ref 9.4–12.3)
RBC # BLD AUTO: 4.17 M/UL (ref 4.23–5.6)
WBC # BLD AUTO: 7.2 K/UL (ref 4.3–11.1)

## 2025-03-06 PROCEDURE — 36415 COLL VENOUS BLD VENIPUNCTURE: CPT

## 2025-03-06 PROCEDURE — 85025 COMPLETE CBC W/AUTO DIFF WBC: CPT

## 2025-03-06 PROCEDURE — 6370000000 HC RX 637 (ALT 250 FOR IP): Performed by: FAMILY MEDICINE

## 2025-03-06 RX ORDER — PANTOPRAZOLE SODIUM 40 MG/1
40 TABLET, DELAYED RELEASE ORAL
Qty: 30 TABLET | Refills: 3 | Status: SHIPPED | OUTPATIENT
Start: 2025-03-07

## 2025-03-06 RX ADMIN — PANTOPRAZOLE SODIUM 40 MG: 40 TABLET, DELAYED RELEASE ORAL at 06:49

## 2025-03-06 RX ADMIN — EZETIMIBE 10 MG: 10 TABLET ORAL at 09:02

## 2025-03-06 RX ADMIN — LEVETIRACETAM 500 MG: 500 TABLET, FILM COATED ORAL at 09:02

## 2025-03-06 RX ADMIN — LOSARTAN POTASSIUM 100 MG: 50 TABLET, FILM COATED ORAL at 09:03

## 2025-03-06 RX ADMIN — ALLOPURINOL 100 MG: 100 TABLET ORAL at 09:06

## 2025-03-06 RX ADMIN — FINASTERIDE 5 MG: 5 TABLET, FILM COATED ORAL at 09:03

## 2025-03-06 NOTE — DISCHARGE SUMMARY
Hospitalist Discharge Summary   Admit Date:  3/1/2025 12:41 AM   DC Note date: 3/6/2025  Name:  Polo Smith   Age:  89 y.o.  Sex:  male  :  10/9/1934   MRN:  [id number]   Room:  [facility]  PCP:  Renzo Gomez MD    Presenting Complaint: Vomiting     Initial Admission Diagnosis: Anemia due to acute blood loss [D62]  GI bleed [K92.2]  History of hematemesis [Z87.19]     Problem List for this Hospitalization (present on admission):    Principal Problem:    GI bleed  Active Problems:    Chronic diastolic heart failure (HCC)    Essential hypertension    DVT (deep venous thrombosis) ([facility])    Atrial fibrillation (HCC)    Projectile vomiting    Acute blood loss anemia    Skin ulcer of sacrum ([facility])    Nursing home resident    Advanced age    DNR (do not resuscitate)    Bradycardia    History of DVT (deep vein thrombosis)    ANGEL (acute kidney injury)  Resolved Problems:    * No resolved hospital problems. *      Hospital Course:  Polo Smith is a 89 y.o. male with medical history of  intracranial hemorrhage, hypertension, hyperlipidemia, type 2 diabetes mellitus, benign prostatic hypertrophy, atrial fibrillation, hypokalemia, UTI, stage III sacral ulcer who presented from his nursing home at [facility] with complaints of coffee-ground hematemesis.     Patient admitted for possible GI bleed.  No further episodes of emesis since admission.  GI consulted and recommend conservative management at this time.  No plan for EGD.  Hemoglobin remained stable.     Patient noted with sinus bradycardia, heart rate dropped to 30s with underlying A-fib rhythm.  Cardiology consulted.  Carvedilol held.  Cardiology recommended bradycardia likely multifactorial in the setting of GI bleed, metabolic encephalopathy and on beta-blocker.  Heart rate improved and remained stable.  Carvedilol discontinued on discharge.    ANGEL resolved with IV fluid.  All other chronic conditions stable and we continued essential home meds.  No new complaint on the  01:05 AM    GLUCOSEU Negative 01/31/2025 01:05 AM    KETUA Negative 01/31/2025 01:05 AM    BILIRUBINUR Negative 01/31/2025 01:05 AM    BLOODU SMALL 01/31/2025 01:05 AM    UROBILINOGEN 1.0 01/31/2025 01:05 AM    NITRU Negative 01/31/2025 01:05 AM    LEUKOCYTESUR MODERATE 01/31/2025 01:05 AM    WBCUA 20-50 01/31/2025 01:05 AM    RBCUA 10-20 01/31/2025 01:05 AM    BACTERIA 2+ 01/31/2025 01:05 AM        Microbiology:  Results       Procedure Component Value Units Date/Time    COVID-19, Rapid [[id number]] Collected: 03/04/25 1140    Order Status: Completed Specimen: Nasopharyngeal Updated: 03/04/25 1212     Source NASAL        SARS-CoV-2, Rapid Not detected        Comment:    The specimen is NEGATIVE for SARS-CoV-2, the novel coronavirus associated with COVID-19.  A negative result does not rule out COVID-19.     This test has been authorized by the FDA under an Emergency Use Authorization (EUA) for use by authorized laboratories.      Fact sheet for Healthcare Providers: https://www.fda.gov/media/905816/download  Fact sheet for Patients: https://www.fda.gov/media/833608/download     Methodology: Isothermal Nucleic Acid Amplification                 All Labs from Last 24 Hrs:  Recent Results (from the past 24 hour(s))   CBC with Auto Differential    Collection Time: 03/06/25  4:30 AM   Result Value Ref Range    WBC 7.2 4.3 - 11.1 K/uL    RBC 4.17 (L) 4.23 - 5.6 M/uL    Hemoglobin 11.5 (L) 13.6 - 17.2 g/dL    Hematocrit 35.4 (L) 41.1 - 50.3 %    MCV 84.9 82 - 102 FL    MCH 27.6 26.1 - 32.9 PG    MCHC 32.5 31.4 - 35.0 g/dL    RDW 14.4 11.9 - 14.6 %    Platelets 163 150 - 450 K/uL    MPV 10.0 9.4 - 12.3 FL    nRBC 0.00 0.0 - 0.2 K/uL    Differential Type AUTOMATED      Neutrophils % 78.7 (H) 43.0 - 78.0 %    Lymphocytes % 12.4 (L) 13.0 - 44.0 %    Monocytes % 7.5 4.0 - 12.0 %    Eosinophils % 0.6 0.5 - 7.8 %    Basophils % 0.1 0.0 - 2.0 %    Immature Granulocytes % 0.7 0.0 - 5.0 %    Neutrophils Absolute 5.65 1.70 - 8.20  K/UL    Lymphocytes Absolute 0.89 0.50 - 4.60 K/UL    Monocytes Absolute 0.54 0.10 - 1.30 K/UL    Eosinophils Absolute 0.04 0.00 - 0.80 K/UL    Basophils Absolute 0.01 0.00 - 0.20 K/UL    Immature Granulocytes Absolute 0.05 0.0 - 0.5 K/UL       Recent Labs     03/04/25  1140   COVID19 Not detected       Recent Vital Data:  Patient Vitals for the past 24 hrs:   Temp Pulse Resp BP SpO2   03/06/25 0344 97.7 °F (36.5 °C) 89 16 139/63 97 %   03/06/25 0037 97.7 °F (36.5 °C) 72 16 138/72 95 %   03/05/25 2042 97.5 °F (36.4 °C) 84 16 (!) 168/85 97 %   03/05/25 1616 97.7 °F (36.5 °C) 62 16 (!) 151/72 97 %   03/05/25 1119 97.7 °F (36.5 °C) 85 16 139/63 98 %   03/05/25 0742 98.4 °F (36.9 °C) 85 16 (!) 156/98 98 %       Oxygen Therapy  SpO2: 97 %  Pulse via Oximetry: 89 beats per minute  O2 Device: None (Room air)    Estimated body mass index is 20.25 kg/m² as calculated from the following:    Height as of this encounter: 1.829 m (6').    Weight as of this encounter: 67.7 kg (149 lb 4.8 oz).    Intake/Output Summary (Last 24 hours) at 3/6/2025 0720  Last data filed at 3/6/2025 0432  Gross per 24 hour   Intake 770 ml   Output 1100 ml   Net -330 ml         Physical Exam:    General:    Well nourished.  No overt distress  Head:  Normocephalic, atraumatic  Eyes:  Sclerae appear normal.  Pupils equally round.    HENT:  Nares appear normal, no drainage.  Moist mucous membranes  Neck:  No restricted ROM.  Trachea midline  CV:   RRR.  No m/r/g.  No JVD  Lungs:   CTAB.  No wheezing, rhonchi, or rales.  Respirations even, unlabored  Abdomen:   Soft, nontender, nondistended.    Extremities: Warm and dry.   No edema.    Skin:     No rashes.  Normal coloration  Neuro:  CN II-XII grossly intact.  Psych:  Normal mood and affect.        Time spent in patient discharge and coordination 37 minutes.      Signed:  RICAROD MARIN MD    Part of this note may have been written by using a voice dictation software.  The note has been proof read but may

## 2025-03-06 NOTE — PROGRESS NOTES
Telephone report given to Swetha LOWE who will be assuming care of patient at [facility]. Per CM note, [facility] to pick patient up at 1000.

## 2025-03-06 NOTE — CARE COORDINATION
03/06/25 0955   Discharge Planning   Patient expects to be discharged to:   ([facility], room 202a.)   Services At/After Discharge   Transition of Care Consult (CM Consult) SNF   Partner SNF Yes   Services At/After Discharge Skilled Nursing Facility (SNF)   [facility] Resource Information Provided? No   Mode of Transport at Discharge BLS   Hospital Transport Time of Discharge 1000   Confirm Follow Up Transport Family   Condition of Participation: Discharge Planning   The Plan for Transition of Care is related to the following treatment goals: STR in SNF   The Patient and/or Patient Representative was provided with a Choice of Provider? Patient   Name of the Patient Representative who was provided with the Choice of Provider and agrees with the Discharge Plan?  Son, Pop   The Patient and/Or Patient Representative agree with the Discharge Plan? Yes   Freedom of Choice list was provided with basic dialogue that supports the patient's individualized plan of care/goals, treatment preferences, and shares the quality data associated with the providers?  Yes     Discharge order placed. Report called to [facility] At [phone], [address]. Assigned room [address].  Covid screen negative.

## 2025-03-06 NOTE — PLAN OF CARE
Problem: Chronic Conditions and Co-morbidities  Goal: Patient's chronic conditions and co-morbidity symptoms are monitored and maintained or improved  3/6/2025 0736 by Rigoberto Zhou RN  Outcome: Completed  3/5/2025 2235 by Milvia Blankenship RN  Outcome: Progressing     Problem: Discharge Planning  Goal: Discharge to home or other facility with appropriate resources  3/6/2025 0736 by Rigoberto Zhou RN  Outcome: Completed  3/5/2025 2235 by Milvia Blankenship RN  Outcome: Progressing     Problem: Safety - Adult  Goal: Free from fall injury  3/6/2025 0736 by Rigoberto Zhou RN  Outcome: Completed  3/5/2025 2235 by Milvia Blankenship RN  Outcome: Progressing     Problem: Pain  Goal: Verbalizes/displays adequate comfort level or baseline comfort level  3/6/2025 0736 by Rigoberto Zhou RN  Outcome: Completed  3/5/2025 2235 by Milvia Blankenship RN  Outcome: Progressing     Problem: Skin/Tissue Integrity  Goal: Skin integrity remains intact  Description: 1.  Monitor for areas of redness and/or skin breakdown  2.  Assess vascular access sites hourly  3.  Every 4-6 hours minimum:  Change oxygen saturation probe site  4.  Every 4-6 hours:  If on nasal continuous positive airway pressure, respiratory therapy assess nares and determine need for appliance change or resting period  3/6/2025 0736 by Rigoberto Zhou RN  Outcome: Completed  3/5/2025 2235 by Milvia Balnkenship RN  Outcome: Progressing     Problem: ABCDS Injury Assessment  Goal: Absence of physical injury  3/6/2025 0736 by Rigoberto Zhou RN  Outcome: Completed  3/5/2025 2235 by Milvia Blankenship RN  Outcome: Progressing     Problem: Neurosensory - Adult  Goal: Achieves stable or improved neurological status  3/6/2025 0736 by Rigoberto Zhou RN  Outcome: Completed  3/5/2025 2235 by Milvia Blankenship RN  Outcome: Progressing  Goal: Absence of seizures  3/6/2025 0736 by Rigoberto Zhou RN  Outcome: Completed  3/5/2025 2235 by Milvia Blankenship RN  Outcome: Progressing  Goal:  Achieves maximal functionality and self care  3/6/2025 0736 by Rigoberto Zhou RN  Outcome: Completed  3/5/2025 2235 by Milvia Blankenship RN  Outcome: Progressing     Problem: Cardiovascular - Adult  Goal: Maintains optimal cardiac output and hemodynamic stability  3/6/2025 0736 by Rigoberto Zhou RN  Outcome: Completed  3/5/2025 2235 by Milvia Blankenship RN  Outcome: Progressing     Problem: Musculoskeletal - Adult  Goal: Return mobility to safest level of function  3/6/2025 0736 by Rigoberto Zhou RN  Outcome: Completed  3/5/2025 2235 by Milvia Blankenship RN  Outcome: Progressing  Goal: Return ADL status to a safe level of function  3/6/2025 0736 by Rigoberto Zhou RN  Outcome: Completed  3/5/2025 2235 by Milvia Blankenship RN  Outcome: Progressing

## 2025-03-06 NOTE — DISCHARGE INSTRUCTIONS
Gastrointestinal Bleeding: Care Instructions  Overview     The digestive or gastrointestinal tract goes from the mouth to the anus. It is often called the GI tract.  Bleeding can happen anywhere in the GI tract. It may be caused by an ulcer, an infection, or cancer. It may also be caused by medicines such as aspirin or ibuprofen.  Light bleeding may not cause any symptoms at first. But if you continue to bleed for a while, you may feel weak or tired.  Sudden, heavy bleeding means you need to see a doctor right away. The doctor may do some tests to find the cause of your bleeding. Treatment is needed to control the bleeding and treat the cause of the bleeding.  Follow-up care is a key part of your treatment and safety. Be sure to make and go to all appointments, and call your doctor if you are having problems. It's also a good idea to know your test results and keep a list of the medicines you take.  How can you care for yourself at home?  Be safe with medicines. Take your medicines exactly as prescribed. Call your doctor if you think you are having a problem with your medicine. You will get more details on the specific medicines your doctor prescribes.  Do not take blood thinners, aspirin, or other anti-inflammatory medicines, such as naproxen (Aleve) or ibuprofen (Advil, Motrin), without talking to your doctor first.  Do not drink alcohol.  The bleeding may increase your risk for a low red blood cell count (anemia).  When should you call for help?   Call 911 anytime you think you may need emergency care. For example, call if:    You have sudden, severe belly pain.     You vomit blood or what looks like coffee grounds.     You passed out (lost consciousness).     Your stools are maroon or very bloody.   Call your doctor now or seek immediate medical care if:    You are dizzy or lightheaded, or you feel like you may faint.     Your stools are black and look like tar, or they have streaks of blood.     You have  mixed with applesauce or apple juice and taken by mouth or given through a feeding tube. For the treatment and maintenance of GERD, pantoprazole is usually taken once a day. For the treatment of conditions where the stomach produces too much acid, pantoprazole is usually taken twice a day. The delayed-release tablets are usually taken with or without food, and the granules are usually taken 30 minutes before a meal. Take pantoprazole at around the same time(s) every day. Follow the directions on your prescription label carefully, and ask your doctor or pharmacist to explain any part you do not understand. Take pantoprazole exactly as directed. Do not take more or less of it or take it more often or for a longer period of time than prescribed by your doctor.  Swallow the tablets whole; do not split, chew, or crush them. If your doctor has prescribed the 40 mg tablet and it is too big for you to swallow, ask your doctor to prescribe two of the 20 mg tablets instead.  To take the granules, open the packet and either sprinkle the granules onto one teaspoonful of applesauce or into a cup containing one teaspoonful of apple juice. Do not mix the granules with water, other liquids, or other foods. Use all of the granules in the packet; do not divide the granules into smaller doses. If you sprinkle the granules into apple juice, stir the mixture for 5 seconds. Swallow the mixture of applesauce or apple juice and medication right away (within 10 minutes) without chewing or crushing the granules. If you sprinkled the granules on applesauce, take several sips of water to wash the granules down to your stomach. If you sprinkled the granules into apple juice, rinse the cup once or twice with apple juice and drink the apple juice right away to be sure you swallow any leftover granules.  Pantoprazole granules mixed with apple juice may be given through a feeding tube. If you have a feeding tube, ask your doctor how you should take  which the body attacks its own organs causing swelling and loss of function) such as systemic lupus erythematosus.  tell your doctor if you are pregnant, plan to become pregnant, or are breastfeeding. If you become pregnant while taking pantoprazole, call your doctor.  talk to your doctor about the risks and benefits of taking pantoprazole if you are 70 years of age or older. Do not take this medication for a longer period of time than recommended by your doctor.  What SPECIAL DIETARY instructions should I follow?  Unless your doctor tells you otherwise, continue your normal diet.  What should I do IF I FORGET to take a dose?  Take the missed dose as soon as you remember it. However, if it is almost time for your next dose, skip the missed dose and continue your regular dosing schedule. Do not take a double dose to make up for a missed dose.  What SIDE EFFECTS can this medicine cause?  Pantoprazole may cause side effects. Tell your doctor if any of these symptoms are severe or do not go away:  headache  nausea  vomiting  gas  joint pain  diarrhea  dizziness  in men, difficulty achieving or maintaining an erection  Some side effects may be serious. If you experience any of the following symptoms, call your doctor immediately, or get emergency medical help:  blistering, peeling, or bleeding skin; sores on the lips, nose, mouth, or genitals; swollen glands; shortness of breath; fever; or flu-like symptoms  rash hives; itching; swelling of the eyes, face, lips, mouth, throat, or tongue; difficulty breathing or swallowing; or hoarseness  irregular, fast, or pounding heartbeat muscle spasms; uncontrollable shaking of a part of the body; excessive tiredness; lightheadedness; dizziness; or seizures  severe diarrhea with watery stools, stomach pain, or fever that does not go away  new or worsening joint pain; rash on cheeks or arms that is sensitive to sunlight  increased or decreased urination, blood in urine, fatigue,

## 2025-03-10 NOTE — ASSESSMENT & PLAN NOTE
Intracerebral hemorrhage with normal maturation and no signs of any acute rehemorrhage.  Discussed with family that based on the finding location there was a possibility he could have other intracerebral hemorrhages.  This could be secondary to amyloid angiopathy which would place him at increased risk of other hemorrhages.  We also discussed with his current status quality of life and DNR status they should discuss how aggressive they would like to be in the event that he does have another, larger, or life threatening hemorrhage and also to direct caregivers as to how aggressively they would like to return to the ER in the event that he did have a change in his mental status.  Noticed of these issues and said they have discussed some of the things in the past would revisit them as a family so they can advise the caregivers.     We discussed the relative risk of starting the Xarelto back with future bleeding risk versus stroke risk with him having active A-fib he understood these issues and I feel that based on that discussion it is okay to start back to Xarelto.     We will see him on a prn basis

## 2025-03-12 NOTE — PROGRESS NOTES
Physician Progress Note      PATIENT:               QUINCY UGARTE  [facility] #:                  [id number]  :                       10/9/1934  ADMIT DATE:       3/1/2025 12:41 AM  DISCH DATE:        3/6/2025 12:08 PM  RESPONDING  PROVIDER #:        Landon Marrero MD          QUERY TEXT:    Patient admitted with hematemesis. Noted documentation of metabolic   encephalopathy in cardio consult problem list on 3/1. In order to support the   diagnosis of metabolic encephalopathy, please include additional clinical   indicators in your documentation.  Or please document if the diagnosis of   metabolic encephalopathy has been ruled out after further study.    The medical record reflects the following:  Risk Factors: age 89, dementia, acute blood loss  Clinical Indicators: Hgb on admission 12.5, down to 11.2 on 3/4.  Creatinine   1.07,1.44, 0.98. 3/1 ED note \"Sent from [facility] due to dark brown   vomiting for 15 minutes.  Patient denies pain.  Patient arouses to voice but   is alert and oriented x 0\"  3/5 IM progress note \"Psych:  Normal mood and affect.  Underlying dementia.\"  DC summary \"Cardiology recommended bradycardia likely multifactorial in the   setting of GI bleed, metabolic encephalopathy and on beta-blocker.\"  Treatment: labs, imaging, frequent monitoring, cardio consult  Options provided:  -- Metabolic encephalopathy present on admission as evidenced by, Please   document evidence.  -- Metabolic encephalopathy not POA evidenced by, Please document evidence  -- Metabolic encephalopathy  was ruled out  -- Other - I will add my own diagnosis  -- Disagree - Not applicable / Not valid  -- Disagree - Clinically unable to determine / Unknown  -- Refer to Clinical Documentation Reviewer    PROVIDER RESPONSE TEXT:    Metabolic encephalopathy was ruled out after study.    Query created by: Yakelin Carreon on 3/12/2025 8:46 AM      Electronically signed by:  Landon Marrero MD 3/12/2025 1:25 PM

## 2025-03-15 NOTE — PROGRESS NOTES
Physician Progress Note      PATIENT:               QUINCY UGARTE  CSN #:                  [id number]  :                       10/9/1934  ADMIT DATE:       3/1/2025 12:41 AM  DISCH DATE:        3/6/2025 12:08 PM  RESPONDING  PROVIDER #:        Landon Marrero MD          QUERY TEXT:    Patient admitted with hematemesis. Per H&P noted to also have skin ulcer of   sacrum. If possible, please document in progress notes and discharge summary   the stage of the pressure ulcer:    The medical record reflects the following:  Risk Factors: History of stage 3 sacral ulcer  Clinical Indicators: 3/3 RN Wound note \"Sacrum DTI w/ 2 open areas, largest   2x1x0.2cm, pink/red, non-blanchable purple/maroon, surrounding blanchable   erythema, no drainage/odor.\"  Treatment: frequent turning and repositioning, pink silicone border foam,   wound cleanser, incontinence care    Stage 1:  Non-blanchable erythema of intact skin  Stage 2:  Abrasion, Blister, Partial-thickness skin loss, with exposed dermis  Stage 3:  Full-thickness skin loss with damage or necrosis of subcutaneous   tissue  Stage 4:  Full-thickness skin & soft tissue loss through to underlying muscle,   tendon or bone  Unstageable: Obscured full-thickness skin & tissue loss  Options provided:  -- Deep tissue injury pressure ulcer of sacrum present on admission  -- Other - I will add my own diagnosis  -- Disagree - Not applicable / Not valid  -- Disagree - Clinically unable to determine / Unknown  -- Refer to Clinical Documentation Reviewer    PROVIDER RESPONSE TEXT:    This patient has a deep tissue injury pressure ulcer of the sacrum which was   present on admission.    Query created by: Yakelin Carreon on 3/14/2025 7:41 AM      Electronically signed by:  Landon Marrero MD 3/15/2025 2:24 PM

## 2025-04-10 ENCOUNTER — APPOINTMENT (OUTPATIENT)
Dept: GENERAL RADIOLOGY | Age: 89
End: 2025-04-10
Payer: MEDICARE

## 2025-04-10 ENCOUNTER — HOSPITAL ENCOUNTER (EMERGENCY)
Age: 89
Discharge: NURSING, REHAB OR LONG-TERM CARE | End: 2025-04-10
Payer: MEDICARE

## 2025-04-10 ENCOUNTER — APPOINTMENT (OUTPATIENT)
Dept: CT IMAGING | Age: 89
End: 2025-04-10
Payer: MEDICARE

## 2025-04-10 VITALS
TEMPERATURE: 97.8 F | DIASTOLIC BLOOD PRESSURE: 77 MMHG | SYSTOLIC BLOOD PRESSURE: 154 MMHG | HEART RATE: 83 BPM | WEIGHT: 165 LBS | HEIGHT: 72 IN | BODY MASS INDEX: 22.35 KG/M2 | OXYGEN SATURATION: 96 % | RESPIRATION RATE: 17 BRPM
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
## 2025-04-10 LAB
ALBUMIN SERPL-MCNC: 3.1 G/DL (ref 3.2–4.6)
ALBUMIN/GLOB SERPL: 0.9 (ref 1–1.9)
ALP SERPL-CCNC: 117 U/L (ref 40–129)
ALT SERPL-CCNC: 13 U/L (ref 8–55)
ANION GAP SERPL CALC-SCNC: 10 MMOL/L (ref 7–16)
AST SERPL-CCNC: 30 U/L (ref 15–37)
BASOPHILS # BLD: 0.02 K/UL (ref 0–0.2)
BASOPHILS NFR BLD: 0.3 % (ref 0–2)
BILIRUB SERPL-MCNC: 0.6 MG/DL (ref 0–1.2)
BUN SERPL-MCNC: 13 MG/DL (ref 8–23)
CALCIUM SERPL-MCNC: 9.3 MG/DL (ref 8.8–10.2)
CHLORIDE SERPL-SCNC: 107 MMOL/L (ref 98–107)
CO2 SERPL-SCNC: 27 MMOL/L (ref 20–29)
CREAT SERPL-MCNC: 0.93 MG/DL (ref 0.8–1.3)
DIFFERENTIAL METHOD BLD: ABNORMAL
EKG ATRIAL RATE: 97 BPM
EKG DIAGNOSIS: NORMAL
EKG Q-T INTERVAL: 372 MS
EKG QRS DURATION: 101 MS
EKG QTC CALCULATION (BAZETT): 461 MS
EKG R AXIS: 3 DEGREES
EKG T AXIS: 87 DEGREES
EKG VENTRICULAR RATE: 92 BPM
EOSINOPHIL # BLD: 0.17 K/UL (ref 0–0.8)
EOSINOPHIL NFR BLD: 2.7 % (ref 0.5–7.8)
ERYTHROCYTE [DISTWIDTH] IN BLOOD BY AUTOMATED COUNT: 15.9 % (ref 11.9–14.6)
GLOBULIN SER CALC-MCNC: 3.4 G/DL (ref 2.3–3.5)
GLUCOSE SERPL-MCNC: 99 MG/DL (ref 70–99)
HCT VFR BLD AUTO: 36 % (ref 41.1–50.3)
HGB BLD-MCNC: 11.5 G/DL (ref 13.6–17.2)
IMM GRANULOCYTES # BLD AUTO: 0.01 K/UL (ref 0–0.5)
IMM GRANULOCYTES NFR BLD AUTO: 0.2 % (ref 0–5)
LYMPHOCYTES # BLD: 1.72 K/UL (ref 0.5–4.6)
LYMPHOCYTES NFR BLD: 27 % (ref 13–44)
MCH RBC QN AUTO: 27.4 PG (ref 26.1–32.9)
MCHC RBC AUTO-ENTMCNC: 31.9 G/DL (ref 31.4–35)
MCV RBC AUTO: 85.9 FL (ref 82–102)
MONOCYTES # BLD: 0.66 K/UL (ref 0.1–1.3)
MONOCYTES NFR BLD: 10.3 % (ref 4–12)
NEUTS SEG # BLD: 3.8 K/UL (ref 1.7–8.2)
NEUTS SEG NFR BLD: 59.5 % (ref 43–78)
NRBC # BLD: 0 K/UL (ref 0–0.2)
PLATELET # BLD AUTO: 251 K/UL (ref 150–450)
PMV BLD AUTO: 10 FL (ref 9.4–12.3)
POTASSIUM SERPL-SCNC: 3.7 MMOL/L (ref 3.5–5.1)
PROT SERPL-MCNC: 6.5 G/DL (ref 6.3–8.2)
RBC # BLD AUTO: 4.19 M/UL (ref 4.23–5.6)
SODIUM SERPL-SCNC: 144 MMOL/L (ref 136–145)
WBC # BLD AUTO: 6.4 K/UL (ref 4.3–11.1)

## 2025-04-10 PROCEDURE — 70450 CT HEAD/BRAIN W/O DYE: CPT

## 2025-04-10 PROCEDURE — 93005 ELECTROCARDIOGRAM TRACING: CPT

## 2025-04-10 PROCEDURE — 73502 X-RAY EXAM HIP UNI 2-3 VIEWS: CPT

## 2025-04-10 PROCEDURE — 80053 COMPREHEN METABOLIC PANEL: CPT

## 2025-04-10 PROCEDURE — 85025 COMPLETE CBC W/AUTO DIFF WBC: CPT

## 2025-04-10 PROCEDURE — 99285 EMERGENCY DEPT VISIT HI MDM: CPT

## 2025-04-10 ASSESSMENT — ENCOUNTER SYMPTOMS
ALLERGIC/IMMUNOLOGIC NEGATIVE: 1
GASTROINTESTINAL NEGATIVE: 1
EYES NEGATIVE: 1
RESPIRATORY NEGATIVE: 1

## 2025-04-10 ASSESSMENT — LIFESTYLE VARIABLES
HOW OFTEN DO YOU HAVE A DRINK CONTAINING ALCOHOL: NEVER
HOW MANY STANDARD DRINKS CONTAINING ALCOHOL DO YOU HAVE ON A TYPICAL DAY: PATIENT DOES NOT DRINK

## 2025-04-10 ASSESSMENT — PAIN - FUNCTIONAL ASSESSMENT: PAIN_FUNCTIONAL_ASSESSMENT: NONE - DENIES PAIN

## 2025-04-10 NOTE — DISCHARGE INSTRUCTIONS
As we discussed, your workup here does not reveal any abnormalities.  Your x-ray and CAT scan are normal without abnormalities.  You may follow-up with your primary care doctor as needed.  As we discussed, please return to the emergency department for any new, worsening, concerning symptoms.

## 2025-04-10 NOTE — ED PROVIDER NOTES
Emergency Department Provider Note       PCP: Renzo Gomez MD   Age: 89 y.o.   Sex: male     DISPOSITION Decision To Discharge 04/10/2025 02:11:28 PM    ICD-10-CM    1. Fall, initial encounter  W19.XXXA           Medical Decision Making     In summary, this is a well-appearing nontoxic 89-year-old male coming into the emergency department via EMS from nursing facility for complaints of unwitnessed fall that occurred yesterday.  Patient has no complaints at this time.  Patient does have history of head bleed which we obtained a CAT scan which did not reveal any acute abnormalities here.  He did have some tenderness to the anterior left hip upon palpation which we obtained an x-ray for.  There was no acute finding.  Lab work here today unremarkable and patient's baseline.  Patient is around baseline mentation.  Given negative workup, stable vital signs and for that he is appropriate for discharge.  Findings were discussed with patient.  Strict return precautions were placed in discharge paperwork for facility.  ED Course as of 04/10/25 1413   Thu Apr 10, 2025   1328 CBC around baseline.  No acute findings [TC]   1350 CMP unremarkable. [TC]   1350 X-ray per radiology read.  IMPRESSION:  Findings/impression: No acute finding. Mild to moderate degenerative changes of  the left greater than right hips and to a lesser degree the pubic symphysis and  sacroiliac joints. Small metallic density projecting just inferior to the pubic  symphysis. Incompletely evaluated degenerative changes of the lumbar spine.  Atherosclerotic changes seen. If clinical concern remains recommend CT pelvis  for further evaluation with   [TC]   1411 CT head per radiology.  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     Previous notified left frontal hematoma has resolved.     No acute hemorrhage or mass effect.   [TC]      ED Course User Index  [TC] Thomas Cortez, APRN - NP

## 2025-04-10 NOTE — ED TRIAGE NOTES
[facility] Unwitnessed fall yesterday, unknown LOC,  No Blood thinners, h/o head bleed, No complaints, A&Ox2 is baseline, h/o dementia

## 2025-04-21 ENCOUNTER — OFFICE VISIT (OUTPATIENT)
Dept: GASTROENTEROLOGY | Age: 89
End: 2025-04-21
Payer: MEDICARE

## 2025-04-21 DIAGNOSIS — Z87.19 HISTORY OF HEMATEMESIS: Primary | ICD-10-CM

## 2025-04-21 PROCEDURE — 1159F MED LIST DOCD IN RCRD: CPT

## 2025-04-21 PROCEDURE — 99213 OFFICE O/P EST LOW 20 MIN: CPT

## 2025-04-21 PROCEDURE — 1160F RVW MEDS BY RX/DR IN RCRD: CPT

## 2025-04-21 PROCEDURE — G8427 DOCREV CUR MEDS BY ELIG CLIN: HCPCS

## 2025-04-21 PROCEDURE — 1123F ACP DISCUSS/DSCN MKR DOCD: CPT

## 2025-04-21 PROCEDURE — 1036F TOBACCO NON-USER: CPT

## 2025-04-21 PROCEDURE — G8420 CALC BMI NORM PARAMETERS: HCPCS

## 2025-04-21 NOTE — PROGRESS NOTES
GASTROENTEROLOGY CLINIC VISIT      CC: Follow up hospital visit    HPI:   Polo Smith is 89 y.o. y/o male established patient who presented to the office for a follow up after recently being hospitalized 3/1-3/6 for hematemesis. He had a 1x episode before being admitted but since this time has not had any more episodes of hematemesis. He denies any nausea, vomiting, abdominal pain, bloody or black stools. He denies alcohol or tobacco use. His son thinks he may be on Xarelto but is unsure. He is feeling well and eating well with normal BM's.       PMH/PSH:   CHF, HTN, DVT, stroke, A. Fib, GI bleed, DM2, BPH, Prostate cancer, HLD, anemia    FMH:   Denies FMH gastric or colorectal cancer   Denies FMH autoimmune disease     SocHx:   Denies smoking  Denies alcohol  Denies illicit drug use    PE:  There were no vitals filed for this visit.    Physical Exam:   General appearance - alert, well appearing, and in no distress and oriented to person, place, and time  Mental status - alert, oriented to person, place, and time, normal mood, behavior, speech, dress, motor activity, and thought processes  Abdomen -abdomen is soft without significant tenderness, masses, organomegaly or guarding.  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 Mesenteric arteries are patent with